# Patient Record
Sex: FEMALE | Race: WHITE | NOT HISPANIC OR LATINO | Employment: PART TIME | URBAN - NONMETROPOLITAN AREA
[De-identification: names, ages, dates, MRNs, and addresses within clinical notes are randomized per-mention and may not be internally consistent; named-entity substitution may affect disease eponyms.]

---

## 2017-07-12 ENCOUNTER — COMMUNICATION - GICH (OUTPATIENT)
Dept: FAMILY MEDICINE | Facility: OTHER | Age: 42
End: 2017-07-12

## 2017-07-12 DIAGNOSIS — K21.00 GASTRO-ESOPHAGEAL REFLUX DISEASE WITH ESOPHAGITIS: ICD-10-CM

## 2017-12-28 NOTE — TELEPHONE ENCOUNTER
Patient Information     Patient Name MRN Yanna Burleson [id number] Female 1975      Telephone Encounter by Victorino Hunt RN at 2017  1:47 PM     Author:  Victorino Hunt RN Service:  (none) Author Type:  NURS- Registered Nurse     Filed:  2017  1:57 PM Encounter Date:  2017 Status:  Signed     :  Victorino Hunt RN (NURS- Registered Nurse)            This is a Refill request from: [facility]  Name of Medication: Pepcid  Quantity requested: 60 tabs with 1 refill  Last fill date: 17 as per rx request  Due for refill: Unknown, see below  Last visit with BONG MUSA was on: 2016 in [facility]  PCP:  Bong Musa MD  Controlled Substance Agreement:  N/A   Diagnosis r/t this medication request: GERD with esophagitis    Chart review shows that rx was most recently ordered and addressed by PCP on 16, for a 2 month supply. Most recent refill was on 17 as per pharmacy report. Chart review shows that patient was seen by PCP on 16 for diagnosis as listed above, with plan as follows in relation to GERD:    Continue PPI with pepcid.  Consider change to protonix pending EGD.  May have esophageal stricture based on description.  Suggest small bites and plenty of liquids.    Patient did have EGD completed on 16, and follow up with surgeon on 16. Writer unable to find documentation in office visit with surgeon on 16 that patient was to stop or continue pepcid. Call placed to patient to discuss. Was unable to reach patient at this time. Detailed message left for patient, advising her that PCP was out of the office until 17. Advised patient that she was due for a follow up office visit with PCP to discuss continued use of pepcid after EGD, as well as that rx refill would be addressed on 17 when PCP returns to his office. Advised patient to call back with any questions/concerns. Writer will route rx request  to PCP as noted above for his consideration/approval.     Unable to complete prescription refill per RN Medication Refill Policy.................... Victorino Hunt RN ....................  7/14/2017   1:49 PM

## 2018-02-11 ENCOUNTER — DOCUMENTATION ONLY (OUTPATIENT)
Dept: FAMILY MEDICINE | Facility: OTHER | Age: 43
End: 2018-02-11

## 2018-02-11 PROBLEM — K44.9 HIATAL HERNIA: Status: ACTIVE | Noted: 2018-02-11

## 2018-02-11 PROBLEM — G89.29 NECK PAIN, CHRONIC: Status: ACTIVE | Noted: 2018-02-11

## 2018-02-11 PROBLEM — M54.2 NECK PAIN, CHRONIC: Status: ACTIVE | Noted: 2018-02-11

## 2018-02-11 RX ORDER — PANTOPRAZOLE SODIUM 40 MG/1
40 TABLET, DELAYED RELEASE ORAL DAILY
COMMUNITY
Start: 2016-12-06 | End: 2018-02-14

## 2018-02-11 RX ORDER — FAMOTIDINE 20 MG/1
20 TABLET, FILM COATED ORAL 2 TIMES DAILY
COMMUNITY
Start: 2017-07-18 | End: 2018-02-14

## 2018-02-14 DIAGNOSIS — K21.00 GASTROESOPHAGEAL REFLUX DISEASE WITH ESOPHAGITIS: ICD-10-CM

## 2018-02-14 DIAGNOSIS — K44.9 HIATAL HERNIA: Primary | ICD-10-CM

## 2018-02-14 NOTE — LETTER
February 21, 2018      Yanna Ordonez  [address] [address]        Dear Yanna,     This letter is to remind you that you are due for your annual exam with No primary care provider on file.. Your last comprehensive visit was more than 12 months ago.    A LIMITED refill of      Famotidine (PEPCID) 20 mg tablet  Pantoprazole (PROTONIX) 40 mg EC tablet    has been called into your pharmacy. Additional refills require you to complete this appointment.    Please call the clinic at [phone] to schedule your appointment.    If you should require additional refills before your scheduled appointment, please contact your pharmacy and we will refill your medication until the date of your appointment.    If you are no longer seeing Bong Che MD, please call to let us know. Doing so will remove you from our call/contact list.      Thank you for choosing [facility] and [facility] for your health care needs.    Sincerely,    Refill RN  [facility]

## 2018-02-21 RX ORDER — FAMOTIDINE 20 MG/1
TABLET, FILM COATED ORAL
Qty: 180 TABLET | Refills: 0 | Status: SHIPPED | OUTPATIENT
Start: 2018-02-21 | End: 2018-07-31

## 2018-02-21 RX ORDER — PANTOPRAZOLE SODIUM 40 MG/1
TABLET, DELAYED RELEASE ORAL
Qty: 90 TABLET | Refills: 0 | Status: SHIPPED | OUTPATIENT
Start: 2018-02-21 | End: 2018-07-31

## 2018-02-21 NOTE — TELEPHONE ENCOUNTER
Patient was due for annual exam on 12/16/2017. Patient will be sent reminder letter and given dre refills at this time.    Lizzy Rivero RN .............. 2/21/2018  9:55 AM

## 2018-03-20 ENCOUNTER — NURSE TRIAGE (OUTPATIENT)
Dept: FAMILY MEDICINE | Facility: OTHER | Age: 43
End: 2018-03-20

## 2018-03-20 NOTE — TELEPHONE ENCOUNTER
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 Patient presented at check-out window in [address] and was then triaged. Patient stated she had no personal history of HTN, but her family had a history of cardiac issues. Patient also wondered about possible hormonal changes (has had abnormal periods) or changes in blood sugar as possible culprits. Patient also states she sometimes \"feels her heart flutter.\" Blood pressure was assessed: Average (using machine) : 140/89 Right arm, sitting, Pulse around 90 bpm and again manually 148/98, Right arm, sitting.  Triage nurse called and spoke with Dr. Che and he states low blood sugar was unlikely, but requested Patient make appointment for today or tomorrow at the latest and then follow-up with him. Patient urged to call back or have someone drive her to the ED if she experienced breathing difficulty, chest pain, increased headache, dizziness, confusion, changes in vision or worsening of symptoms. Patient verbalized agreement with this plan. Patient made appointment at check-out with Antonieta Mckeon, tomorrow at 10:00 AM. Will send FYI to Antonieta Mckeon and PCP, Bong Che MD regarding this triage call.    Additional Information    Negative: Severe difficulty breathing (e.g., struggling for each breath, speaks in single words)    Negative: Difficult to awaken or acting confused  (e.g., disoriented, slurred speech)    Negative: [1] Weakness of the face, arm or leg on one side of the body AND [2] new onset    Negative: [1] Numbness (i.e., loss of sensation) of the face, arm or leg on one side of the body AND [2] new onset    Negative: [1] Chest pain lasts > 5 minutes AND [2] history of heart disease  (i.e., heart attack, bypass surgery, angina, angioplasty, CHF)    Negative: [1] " "Chest pain AND [2] took nitrogylcerin AND [3] pain was not relieved    Negative: Sounds like a life-threatening emergency to the triager    Negative: Low blood pressure is main concern    Negative: Symptom is main concern  (e.g., headache, chest pain)    Negative: [1] Pregnant AND [2] new hand or face swelling    Negative: [1] Pregnant > 20 weeks AND [2] BP  >= 140/90    Negative: Ran out of BP medications    Negative: [1] BP  >= 160 / 100 AND [2] cardiac or neurologic symptoms    (e.g., chest pain, difficulty breathing, unsteady gait, blurred vision)    Negative: [1] BP  >= 200/120  AND [2] having NO cardiac or neurologic symptoms    Negative: [1] BP  >= 180/110 AND [2] missed most recent dose of blood pressure medication    Negative: BP  >= 180/110    Negative: BP  >= 160/100    Negative: [1] Taking BP medications AND [2] feels is having side effects (e.g., impotence, cough, dizzy upon standing)    Negative: [1] BP  >= 140/90 AND [2] taking BP medications    Negative: [1] BP  >= 140/90 AND [2] not taking BP medications    Negative: [1] BP  >= 130/80 AND [2] history of heart problems, kidney disease or diabetes    Negative: Wants doctor to measure BP    Negative: [1] BP  < 140 /90 AND [2] taking BP medications (all triage questions negative)    Negative: BP  120-139 / 80-89  (all triage questions negative)    Negative: BP  < 120/80  (all triage questions negative)    Negative: Healthy diet, questions about    Negative: Low salt diet, questions about    Answer Assessment - Initial Assessment Questions  1. BLOOD PRESSURE: \"What is the blood pressure?\" \"Did you take at least two measurements 5 minutes apart?\"      \"140/100\" approx when checked here in OB at 9:00 am  2. ONSET: \"When did you take your blood pressure?\"      9:30 am here at clinic as she felt \"funny\"  3. HOW: \"How did you obtain the blood pressure?\" (e.g., visiting nurse, automatic home BP monitor)      OB nurse  4. HISTORY: \"Do you have a history of high " "blood pressure?\"      No-usually lower  5. MEDICATIONS: \"Are you taking any medications for blood pressure?\" \"Have you missed any doses recently?\"      No  6. OTHER SYMPTOMS: \"Do you have any symptoms?\" (e.g., headache, chest pain, blurred vision, difficulty breathing, weakness)      Headache, \"brain fog\", shaky, all over weakness, warm/flushed.  7. PREGNANCY: \"Is there any chance you are pregnant?\" \"When was your last menstrual period?\"      No    Protocols used: HIGH BLOOD PRESSURE-ADULT-    Lizzy Rivero RN .............. 3/20/2018  10:30 AM    Attempted reaching Patient to check in with her and see how she was feeling. Patient unavailable at this time.    Lizzy Rivero RN .............. 3/20/2018  2:58 PM    "

## 2018-03-20 NOTE — TELEPHONE ENCOUNTER
Patient was also offered appointment with me today at 230pm but elected to go home and rest and follow up tomorrow.

## 2018-03-21 ENCOUNTER — OFFICE VISIT (OUTPATIENT)
Dept: FAMILY MEDICINE | Facility: OTHER | Age: 43
End: 2018-03-21
Attending: PHYSICIAN ASSISTANT
Payer: COMMERCIAL

## 2018-03-21 VITALS
TEMPERATURE: 99.4 F | WEIGHT: 197.4 LBS | SYSTOLIC BLOOD PRESSURE: 136 MMHG | DIASTOLIC BLOOD PRESSURE: 78 MMHG | HEART RATE: 88 BPM | BODY MASS INDEX: 33.1 KG/M2

## 2018-03-21 DIAGNOSIS — Z13.29 SCREENING FOR THYROID DISORDER: ICD-10-CM

## 2018-03-21 DIAGNOSIS — Z01.419 PAP TEST, AS PART OF ROUTINE GYNECOLOGICAL EXAMINATION: ICD-10-CM

## 2018-03-21 DIAGNOSIS — N92.0 EXCESSIVE OR FREQUENT MENSTRUATION: Primary | ICD-10-CM

## 2018-03-21 DIAGNOSIS — R42 LIGHTHEADEDNESS: ICD-10-CM

## 2018-03-21 DIAGNOSIS — Z12.31 ENCOUNTER FOR SCREENING MAMMOGRAM FOR BREAST CANCER: ICD-10-CM

## 2018-03-21 LAB
ANION GAP SERPL CALCULATED.3IONS-SCNC: 7 MMOL/L (ref 3–14)
BASOPHILS # BLD AUTO: 0.1 10E9/L (ref 0–0.2)
BASOPHILS NFR BLD AUTO: 0.7 %
BUN SERPL-MCNC: 14 MG/DL (ref 7–25)
CALCIUM SERPL-MCNC: 9.6 MG/DL (ref 8.6–10.3)
CHLORIDE SERPL-SCNC: 103 MMOL/L (ref 98–107)
CO2 SERPL-SCNC: 28 MMOL/L (ref 21–31)
CREAT SERPL-MCNC: 0.75 MG/DL (ref 0.6–1.2)
DIFFERENTIAL METHOD BLD: NORMAL
EOSINOPHIL # BLD AUTO: 0.2 10E9/L (ref 0–0.7)
EOSINOPHIL NFR BLD AUTO: 2.9 %
ERYTHROCYTE [DISTWIDTH] IN BLOOD BY AUTOMATED COUNT: 12.7 % (ref 10–15)
GFR SERPL CREATININE-BSD FRML MDRD: 84 ML/MIN/1.7M2
GLUCOSE SERPL-MCNC: 102 MG/DL (ref 70–105)
HCT VFR BLD AUTO: 41.7 % (ref 35–47)
HGB BLD-MCNC: 14.2 G/DL (ref 11.7–15.7)
IMM GRANULOCYTES # BLD: 0 10E9/L (ref 0–0.4)
IMM GRANULOCYTES NFR BLD: 0.3 %
LYMPHOCYTES # BLD AUTO: 1.7 10E9/L (ref 0.8–5.3)
LYMPHOCYTES NFR BLD AUTO: 24.7 %
MCH RBC QN AUTO: 28.5 PG (ref 26.5–33)
MCHC RBC AUTO-ENTMCNC: 34.1 G/DL (ref 31.5–36.5)
MCV RBC AUTO: 84 FL (ref 78–100)
MONOCYTES # BLD AUTO: 0.3 10E9/L (ref 0–1.3)
MONOCYTES NFR BLD AUTO: 3.8 %
NEUTROPHILS # BLD AUTO: 4.6 10E9/L (ref 1.6–8.3)
NEUTROPHILS NFR BLD AUTO: 67.6 %
PLATELET # BLD AUTO: 304 10E9/L (ref 150–450)
POTASSIUM SERPL-SCNC: 4.4 MMOL/L (ref 3.5–5.1)
RBC # BLD AUTO: 4.99 10E12/L (ref 3.8–5.2)
SODIUM SERPL-SCNC: 138 MMOL/L (ref 134–144)
TSH SERPL DL<=0.05 MIU/L-ACNC: 2.44 IU/ML (ref 0.34–5.6)
WBC # BLD AUTO: 6.8 10E9/L (ref 4–11)

## 2018-03-21 PROCEDURE — 36415 COLL VENOUS BLD VENIPUNCTURE: CPT | Performed by: PHYSICIAN ASSISTANT

## 2018-03-21 PROCEDURE — 88142 CYTOPATH C/V THIN LAYER: CPT | Performed by: PHYSICIAN ASSISTANT

## 2018-03-21 PROCEDURE — 87624 HPV HI-RISK TYP POOLED RSLT: CPT | Performed by: PHYSICIAN ASSISTANT

## 2018-03-21 PROCEDURE — 85025 COMPLETE CBC W/AUTO DIFF WBC: CPT | Performed by: PHYSICIAN ASSISTANT

## 2018-03-21 PROCEDURE — 99214 OFFICE O/P EST MOD 30 MIN: CPT | Mod: 25 | Performed by: PHYSICIAN ASSISTANT

## 2018-03-21 PROCEDURE — 40001026 ZZHCL STATISTICAL PAP TEST QC: Performed by: PHYSICIAN ASSISTANT

## 2018-03-21 PROCEDURE — 80048 BASIC METABOLIC PNL TOTAL CA: CPT | Performed by: PHYSICIAN ASSISTANT

## 2018-03-21 PROCEDURE — 84443 ASSAY THYROID STIM HORMONE: CPT | Performed by: PHYSICIAN ASSISTANT

## 2018-03-21 NOTE — PROGRESS NOTES
Nursing Notes:   Laura Vyas LPN  3/21/2018 10:19 AM  Signed  Patient presents to the clinic for high blood pressures.  JUANCARLOS Green........................3/21/2018  10:07 AM      HPI:    Yanna Ordonez is a 42 year old female who presents for high blood pressures. BP was elevated yesterday.  She checked her blood pressure a few times yesterday.  Averaged 149/100.   Did not feel well yesterday.  Pressure on head and neck. Periods off. Period every 2 weeks. Lasted 10 days.  Lightheaded.  Nausea yesterday.  Irregular x couple months.  Hx heavy periods and crampking.  5-10 days.  Occasional RLQ pain.  Comes and goes.  Period ended a week ago.  No pregnancy concerns.  No STD concerns.  No recent Pap test.  No chest pain, palpitations, problems breathing, GI symptoms, urinary symptoms.  No arm pain or jaw pain.  No loss of consciousness.  No dysuria, frequency, urgency, blood in stool or urine.  Patient is due for her repeat mammogram.  No breast concerns at this time.    Past Medical History:   Diagnosis Date     Personal history of other medical treatment (CODE)     Childbirth       Past Surgical History:   Procedure Laterality Date     DILATION AND CURETTAGE      1992,after SAB       Family History   Problem Relation Age of Onset     Other - See Comments Mother 65     Celiac disease     Other - See Comments Father      Glaucoma     Family History Negative Sister      Good Health     Family History Negative Sister      Good Health     Family History Negative Brother      Good Health     Family History Negative Brother      Good Health,Twin     Family History Negative Brother      Good Health,Twin - Had GIB     HEART DISEASE No family hx of      Heart Disease,No known premature CAD     DIABETES No family hx of      Diabetes,DM2     Breast Cancer No family hx of      Cancer-breast     Colon Cancer No family hx of      Cancer-colon       Social History     Social History     Marital status:       Spouse name: Jitendra     Number of children: N/A     Years of education: N/A     Occupational History     Not on file.     Social History Main Topics     Smoking status: Current Every Day Smoker     Packs/day: 0.50     Types: Cigarettes     Last attempt to quit: 10/1/2015     Smokeless tobacco: Never Used      Comment: Quit smoking: Had quit for 6 years and started again about a month ago     Alcohol use 1.0 oz/week     Drug use: No      Comment: Drug use: No     Sexual activity: Yes     Partners: Male      Comment: Birth control method: Spouse vasectomy     Other Topics Concern     Not on file     Social History Narrative    Lives with  and three children ,   Poonam, 1993 -  at [employer], 1996 -  at [employer], 1998 - Graduates this year   Works at [employer].       Current Outpatient Prescriptions   Medication Sig Dispense Refill     pantoprazole (PROTONIX) 40 MG EC tablet Take 1 tablet by mouth once daily. 90 tablet 0     famotidine (PEPCID) 20 MG tablet Take 1 tablet by mouth 2 times daily. 180 tablet 0       No Known Allergies    REVIEW OF SYSTEMS:  Refer to HPI.    EXAM:   Vitals:    /78 (BP Location: Right arm, Patient Position: Sitting, Cuff Size: Adult Regular)  Pulse 88  Temp 99.4  F (37.4  C) (Tympanic)  Wt 197 lb 6.4 oz (89.5 kg)  LMP 03/05/2018  Breastfeeding? No  BMI 33.1 kg/m2    General Appearance: Pleasant, alert, appropriate appearance for age. No acute distress  Eye Exam:  Normal external eye, conjunctiva, lids,cornea. DIVYA.  Ear Exam: Normal TM's bilaterally, normal grey, and translucent. Normal auditory canals and external ears. Non-tender.   OroPharynx Exam:Dental hygiene adequate. Normal buccal mucosa. Normal pharynx.  Chest/Respiratory Exam: Normal chest wall and respirations. Clear to auscultation.  Breast Exam: No dimpling, nipple retraction or discharge. No masses or nodes.  Cardiovascular Exam: Regular rate and rhythm. S1,  S2, no murmur, click, gallop, or rubs.  Gastrointestinal Exam: Soft, non-tender, no masses or organomegaly. Normal BS x 4.  Genitourinary Exam Female: External genitalia, vulva and vagina appear normal. Bimanual exam reveals normaluterus and adnexa, nontender urethra and bladder. No cervical motion tenderness.  Skin: no rash or abnormalities  Neurologic Exam: Nonfocal, normal gross motor, tone coordination and no tremor.  Psychiatric Exam: Alert and oriented - appropriate affect.    PHQ Depression Screen  PHQ-9 SCORE 10/7/2016 10/10/2016 11/22/2016   Total Score 8 7 3       Labs:  Results for orders placed or performed in visit on 03/21/18   CBC with platelets and differential   Result Value Ref Range    WBC 6.8 4.0 - 11.0 10e9/L    RBC Count 4.99 3.8 - 5.2 10e12/L    Hemoglobin 14.2 11.7 - 15.7 g/dL    Hematocrit 41.7 35.0 - 47.0 %    MCV 84 78 - 100 fl    MCH 28.5 26.5 - 33.0 pg    MCHC 34.1 31.5 - 36.5 g/dL    RDW 12.7 10.0 - 15.0 %    Platelet Count 304 150 - 450 10e9/L    Diff Method Automated Method     % Neutrophils 67.6 %    % Lymphocytes 24.7 %    % Monocytes 3.8 %    % Eosinophils 2.9 %    % Basophils 0.7 %    % Immature Granulocytes 0.3 %    Absolute Neutrophil 4.6 1.6 - 8.3 10e9/L    Absolute Lymphocytes 1.7 0.8 - 5.3 10e9/L    Absolute Monocytes 0.3 0.0 - 1.3 10e9/L    Absolute Eosinophils 0.2 0.0 - 0.7 10e9/L    Absolute Basophils 0.1 0.0 - 0.2 10e9/L    Abs Immature Granulocytes 0.0 0 - 0.4 10e9/L   Basic metabolic panel  (Ca, Cl, CO2, Creat, Gluc, K, Na, BUN)   Result Value Ref Range    Sodium 138 134 - 144 mmol/L    Potassium 4.4 3.5 - 5.1 mmol/L    Chloride 103 98 - 107 mmol/L    Carbon Dioxide 28 21 - 31 mmol/L    Anion Gap 7 3 - 14 mmol/L    Glucose 102 70 - 105 mg/dL    Urea Nitrogen 14 7 - 25 mg/dL    Creatinine 0.75 0.60 - 1.20 mg/dL    GFR Estimate 84 >60 mL/min/1.7m2    GFR Estimate If Black >90 >60 mL/min/1.7m2    Calcium 9.6 8.6 - 10.3 mg/dL   TSH GH   Result Value Ref Range    Thyrotropin  2.44 0.34 - 5.60 IU/mL       ASSESSMENT AND PLAN:    1. Excessive or frequent menstruation    2. Pap test, as part of routine gynecological examination    3. Screening for thyroid disorder    4. Lightheadedness    5. Encounter for screening mammogram for breast cancer        Completed labs to rule out concerns.  Patient had an unremarkable CBC, BMP, TSH.  No acute concerns appreciated at this time.    Computed Pap and HPV testing.  Results pending.    Ordered pelvic ultrasound and referred to OB/GYN for a consult.     Encouraged to increase foods that contain iron.   Take a womens multivitamin daily.   Increase fluid intake.     Ordered screening mammogram to rule out breast cancer concerns.    Monitor for chest pain, palpitations, problems breathing, headache, increased lightheadedness or dizziness, increased vaginal bleeding.    Patient Instructions   Completed labs to rule out concerns.     Ordered ultrasound and referred to OB/GYN for a consult.     Encouraged to increase foods that contain iron.   Take a womens multivitamin daily.   Increase fluid intake.       Greater than 25 minutes were spent in counseling and coordination of care.     Antonieta Mckeon..................3/21/2018 10:15 AM

## 2018-03-21 NOTE — LETTER
Yanna Ordonez  [address] [address] [facility] [place] [address]    4/2/2018      Dear Ms. Ordonez,      We've received the results back from the laboratory for the samples you gave in clinic.  Your pap and HPV tests are normal. I would recommend repeating this important screening test in 5 years.      Please contact us at [phone] with any questions or concerns that you have.      Take Care,         Antonieta Mckeon PA-C

## 2018-03-21 NOTE — MR AVS SNAPSHOT
After Visit Summary   3/21/2018    Yanna Ordonez    MRN: [id number]           Patient Information     Date Of Birth          1975        Visit Information        Provider Department      3/21/2018 10:00 AM Antonieta Mckeon PA-C [facility]        Today's Diagnoses     Excessive or frequent menstruation    -  1    Pap test, as part of routine gynecological examination        Screening for thyroid disorder        Lightheadedness          Care Instructions    Completed labs to rule out concerns.     Ordered ultrasound and referred to OB/GYN for a consult.     Encouraged to increase foods that contain iron.   Take a womens multivitamin daily.   Increase fluid intake.           Follow-ups after your visit        Additional Services     OB/GYN REFERRAL       Your provider has referred you to:  [facility]: [facility] and [facility] [phone]   [email or web address]    Please be aware that coverage of these services is subject to the terms and limitations of your health insurance plan.  Call member services at your health plan with any benefit or coverage questions.      Please bring the following with you to your appointment:    (1) Any X-Rays, CTs or MRIs which have been performed.  Contact the facility where they were done to arrange for  prior to your scheduled appointment.   (2) List of current medications   (3) This referral request   (4) Any documents/labs given to you for this referral                  Follow-up notes from your care team     Return if symptoms worsen or fail to improve.      Future tests that were ordered for you today     Open Future Orders        Priority Expected Expires Ordered    US Pelvic Complete with Transvaginal Routine  3/21/2019 3/21/2018            Who to contact     If you have questions or need follow up information about today's clinic visit or your schedule please contact [facility] directly at [phone].  Normal or non-critical lab and imaging results will be communicated to you by MyChart, letter or phone within 4 business days after the clinic has received the results. If you do not hear from us within 7 days, please contact the clinic through [employer] or phone. If you have a critical or abnormal lab result, we will notify you by phone as soon as possible.  Submit refill requests through [employer] or call your pharmacy and they will forward the refill request to us. Please allow 3 business days for your refill to be completed.          Additional Information About Your Visit        [employer] Information     [employer] lets you send messages to your doctor, view your test results, renew your prescriptions, schedule appointments and more. To sign up, go to [email or web address] . Click on \"Log in\" on the left side of the screen, which will take you to the Welcome page. Then click on \"Sign up Now\" on the right side of the page.     You will be asked to enter the access code listed below, as well as some personal information. Please follow the directions to create your username and password.     Your access code is: [email or web address]  Expires: 2018 10:32 AM     Your access code will  in 90 days. If you need help or a new code, please call your [facility] clinic or [phone].        Care EveryWhere ID     This is your Care EveryWhere ID. This could be used by other organizations to access your [facility] medical records  [id number]        Your Vitals Were     Pulse Temperature Last Period Breastfeeding? BMI (Body Mass Index)       88 99.4  F (37.4  C) (Tympanic) 2018 No 33.1 kg/m2        Blood Pressure from Last 3 Encounters:   18 136/78   16 136/80   16 138/88    Weight from Last 3 Encounters:   18 197 lb 6.4 oz (89.5 kg)   16 199 lb (90.3 kg)   16 193 lb (87.5 kg)              We Performed the Following     Basic metabolic panel  (Ca, Cl, " CO2, Creat, Gluc, K, Na, BUN)     CBC with platelets and differential     HPV High Risk Types DNA Cervical     OB/GYN REFERRAL     Pap Diagnostic Thin Prep with HPV (select HPV order below)     [facility]        Primary Care Provider Office Phone # Fax #    Bong Che [phone] [address]  [place] [address]        Equal Access to Services     [facility]: Hadii aad ku hadasho Soomaali, waaxda luqadaha, qaybta kaalmada adeegyada, waxay idiin hayaan adeeg kharash la'aan . So [facility] [phone].    ATENCIÓN: Si habla español, tiene a shelby disposición servicios gratuitos de asistencia lingüística. Llame al [phone].    We comply with applicable federal civil rights laws and [place] laws. We do not discriminate on the basis of race, color, national origin, age, disability, sex, sexual orientation, or gender identity.            Thank you!     Thank you for choosing [facility]  for your care. Our goal is always to provide you with excellent care. Hearing back from our patients is one way we can continue to improve our services. Please take a few minutes to complete the written survey that you may receive in the mail after your visit with us. Thank you!             Your Updated Medication List - Protect others around you: Learn how to safely use, store and throw away your medicines at www.disposemymeds.org.          This list is accurate as of 3/21/18 10:32 AM.  Always use your most recent med list.                   Brand Name Dispense Instructions for use Diagnosis    famotidine 20 MG tablet    PEPCID    180 tablet    Take 1 tablet by mouth 2 times daily.    Gastroesophageal reflux disease with esophagitis       pantoprazole 40 MG EC tablet    PROTONIX    90 tablet    Take 1 tablet by mouth once daily.    Hiatal hernia

## 2018-03-21 NOTE — NURSING NOTE
Patient presents to the clinic for high blood pressures.  JUANCARLOS Green........................3/21/2018  10:07 AM

## 2018-03-21 NOTE — PATIENT INSTRUCTIONS
Completed labs to rule out concerns.     Ordered ultrasound and referred to OB/GYN for a consult.     Encouraged to increase foods that contain iron.   Take a womens multivitamin daily.   Increase fluid intake.

## 2018-03-29 LAB
FINAL DIAGNOSIS: NORMAL
HPV HR 12 DNA CVX QL NAA+PROBE: NEGATIVE
HPV16 DNA SPEC QL NAA+PROBE: NEGATIVE
HPV18 DNA SPEC QL NAA+PROBE: NEGATIVE
SPECIMEN DESCRIPTION: NORMAL
SPECIMEN SOURCE CVX/VAG CYTO: NORMAL

## 2018-04-04 ENCOUNTER — HOSPITAL ENCOUNTER (OUTPATIENT)
Dept: MAMMOGRAPHY | Facility: OTHER | Age: 43
End: 2018-04-04
Attending: PHYSICIAN ASSISTANT
Payer: COMMERCIAL

## 2018-04-04 ENCOUNTER — HOSPITAL ENCOUNTER (OUTPATIENT)
Dept: ULTRASOUND IMAGING | Facility: OTHER | Age: 43
Discharge: HOME | End: 2018-04-04
Attending: PHYSICIAN ASSISTANT | Admitting: PHYSICIAN ASSISTANT
Payer: COMMERCIAL

## 2018-04-04 DIAGNOSIS — Z12.31 ENCOUNTER FOR SCREENING MAMMOGRAM FOR BREAST CANCER: ICD-10-CM

## 2018-04-04 DIAGNOSIS — N92.0 EXCESSIVE OR FREQUENT MENSTRUATION: ICD-10-CM

## 2018-04-04 PROCEDURE — 77067 SCR MAMMO BI INCL CAD: CPT

## 2018-04-04 PROCEDURE — 76856 US EXAM PELVIC COMPLETE: CPT

## 2018-04-10 ENCOUNTER — OFFICE VISIT (OUTPATIENT)
Dept: OBGYN | Facility: OTHER | Age: 43
End: 2018-04-10
Attending: PHYSICIAN ASSISTANT
Payer: COMMERCIAL

## 2018-04-10 VITALS
SYSTOLIC BLOOD PRESSURE: 128 MMHG | HEART RATE: 76 BPM | BODY MASS INDEX: 33.09 KG/M2 | WEIGHT: 197.3 LBS | DIASTOLIC BLOOD PRESSURE: 84 MMHG

## 2018-04-10 DIAGNOSIS — N83.201 CYSTS OF BOTH OVARIES: Primary | ICD-10-CM

## 2018-04-10 DIAGNOSIS — N92.0 EXCESSIVE OR FREQUENT MENSTRUATION: ICD-10-CM

## 2018-04-10 DIAGNOSIS — N83.202 CYSTS OF BOTH OVARIES: Primary | ICD-10-CM

## 2018-04-10 PROCEDURE — 99203 OFFICE O/P NEW LOW 30 MIN: CPT | Performed by: OBSTETRICS & GYNECOLOGY

## 2018-04-10 PROCEDURE — 88305 TISSUE EXAM BY PATHOLOGIST: CPT | Performed by: OBSTETRICS & GYNECOLOGY

## 2018-04-10 ASSESSMENT — PAIN SCALES - GENERAL: PAINLEVEL: NO PAIN (0)

## 2018-04-10 NOTE — LETTER
4/10/2018       RE: Yanna Ordonez  [id number] [facility] [place] [address]     Dear Colleague,    Thank you for referring your patient, Yanna Ordonez, to the [facility] at [facility]. Please see a copy of my visit note below.    CC: heavy and irregular bleeding  HPI:  Yanna is a 42 year old female who presents for evaluation of heavy and irregular periods. This has been worsening especially over the last few months. US was done by her primary care provider showing essentially a normal uterus, however, on my views there is possibly an anterior intramural fibroid. Her bowels and bladder are normal. No breast concerns.  Patient's last menstrual period was 03/10/2018.  Menstrual history: as above  Bladder concerns: no  Bowel concerns:no  Breast concerns:no  Birth control: vasectomy, stable relationship  STI history: denies    Pap Smears: normal  Mammograms: normal.    Obstetric History     No data available        Past Medical History:   Diagnosis Date     Personal history of other medical treatment (CODE)     Childbirth     Past Surgical History:   Procedure Laterality Date     DILATION AND CURETTAGE      1992,after SAB     Social History     Social History     Marital status:      Spouse name: Jitendra     Number of children: N/A     Years of education: N/A     Occupational History     Not on file.     Social History Main Topics     Smoking status: Current Every Day Smoker     Packs/day: 0.50     Types: Cigarettes     Last attempt to quit: 10/1/2015     Smokeless tobacco: Never Used      Comment: Quit smoking: Had quit for 6 years and started again about a month ago     Alcohol use No     Drug use: No      Comment: Drug use: No     Sexual activity: Yes     Partners: Male     Birth control/ protection: None      Comment: Birth control method: Spouse vasectomy     Other Topics Concern     Not on file     Social History Narrative    Lives with   and three children ,   [place], 1993 -  at [place], 1996 -  at [employer], 1998 - Graduates this year   Works at [employer].     Family History   Problem Relation Age of Onset     Other - See Comments Mother 65     Celiac disease     Other - See Comments Father      Glaucoma     Family History Negative Sister      Good Health     Family History Negative Sister      Good Health     Family History Negative Brother      Good Health     Family History Negative Brother      Good Health,Twin     Family History Negative Brother      Good Health,Twin - Had GIB     HEART DISEASE No family hx of      Heart Disease,No known premature CAD     DIABETES No family hx of      Diabetes,DM2     Breast Cancer No family hx of      Cancer-breast     Colon Cancer No family hx of      Cancer-colon       Current Outpatient Prescriptions   Medication     pantoprazole (PROTONIX) 40 MG EC tablet     famotidine (PEPCID) 20 MG tablet     No current facility-administered medications for this visit.      No Known Allergies  /84 (BP Location: Right arm)  Pulse 76  Wt 89.5 kg (197 lb 4.8 oz)  LMP 03/10/2018  Breastfeeding? No  BMI 33.09 kg/m2    REVIEW OF SYSTEMS  General: negative  Resp: No shortness of breath, dyspnea on exertion, cough, or hemoptysis  CV: negative  GI: negative  : negative  Neurologic: negative  Hematologic: negative  Endocrine: negative    Exam:  Constitutional: healthy, alert and no distress  Head: Normocephalic. No masses, lesions, tenderness or abnormalities  Pelvic exam: normal vagina and vulva, normal cervix without lesions or tenderness, uterus normal size anteverted, adenxa normal in size without tenderness, exam chaperoned by nurse.  After consent was obtained, the cervix was prepped with betadine. The cervix was grasped with a tenaculum and gently dilated with an Os Finder. She sounded to 11 cm and three passes were performed productive of a modest  amount of tissue. The cervix was released and no bleeding noted. Patient tolerated the procedure well.    Lab:   Results for orders placed or performed during the hospital encounter of 04/04/18   US Pelvic Complete with Transvaginal    Narrative    PROCEDURE: US PELVIC COMPLETE WITH TRANSVAGINAL 4/4/2018 10:15 AM    HISTORY: abnormal vaginal bleeding for 3-4 months; Excessive or  frequent menstruation    COMPARISONS: None.    TECHNIQUE: Transabdominal and endovaginal imaging.    FINDINGS: Uterus measures 8.9 x 5.4 x 4.7 cm. Endometrial stripe  measures 8 mm. No focal uterine fibroid is seen but there are small  nabothian cysts.    Both ovaries are visualized. Right ovary measures 2.1 x 2.5 x 2.3 cm.  It contains a 1.9 cm complex cystic lesion with linear internal  echoes.    Left ovary measures 4.4 x 3.3 x 3.6 cm. It also contains a complex  cystic lesion. This measures 2.8 x 2.7 cm in size. Other left-sided  follicles are also visualized. No free fluid is seen.         Impression    IMPRESSION: Complex cystic lesions in both ovaries, probably  hemorrhagic cysts or follicles.    SABI EDMONDSON MD       ASSESSMENT/PLAN :  1. Excessive or frequent menstruation      (N92.0) Excessive or frequent menstruation  Comment:   Plan: Surgical pathology exam          If benign biopsy discussed Mirena IUD, Novosure ablation, vaginal hysterectomy, use of OCP's and Depo Provera.  She is leaning toward Mirena at this time. Will set up insertion if she has a negative biopsy.    Recommend repeat US in 8 weeks to recheck ovaries.        TT:30 minutes with over half spent in discussion of management of heavy menses.    Again, thank you for allowing me to participate in the care of your patient.      Sincerely,    Demarco Verdugo MD

## 2018-04-10 NOTE — NURSING NOTE
Prior to the start of the procedure and with procedural staff participation, I verbally confirmed the patient s identity using two indicators, relevant allergies, that the procedure was appropriate and matched the consent or emergent situation, and that the correct equipment/implants were available. Immediately prior to starting the procedure I conducted the Time Out with the procedural staff and re-confirmed the patient s name, procedure, and site/side. (The Joint Commission universal protocol was followed.)  Yes    Sedation (Moderate or Deep): None  Kelsey Jurado LPN ....................  4/10/2018   11:24 AM

## 2018-04-10 NOTE — PATIENT INSTRUCTIONS
Endometrial Ablation  Endometrial ablation is an outpatient surgery that can reduce or stop heavy menstrual bleeding. Ablation destroys the lining of the uterus. This surgery is for women who do not want to have any more children and who have not yet entered menopause. It should not be used by women with endometrial hyperplasia or cancer of the uterus. Surgery takes less than an hour, and you can go home later that day.Endometrial ablation is an outpatient surgery that can reduce or stop heavy menstrual bleeding. Ablation destroys the lining of the uterus. This surgery is for women who do not want to have any more children and who have not yet entered menopause. It should not be used by women with endometrial hyperplasia or cancer of the uterus. Surgery takes less than an hour, and you can go home later that day.  Preparing for surgery    You may be given medicine by mouth or injection for a few weeks or months before your surgery. This thins the lining of the uterus and reduces bleeding.    Your healthcare provider may recommend other procedures to check the inside of your uterus before the ablation is done.     The day before surgery, you may be given medicine or a special substance (laminaria) may be put into the opening to the uterus (cervix). This widens the opening.    To help prevent problems with anesthesia, do not eat or drink anything 10 hours before surgery.  Your surgery     Destroying the lining with heat, freezing, or electric current prevents the lining from growing back.        You ll be given anesthesia so you stay comfortable and relaxed. You will not feel pain during surgery.    Your uterus may be filled with fluid. This puts pressure on the lining to help reduce bleeding. It also allows your healthcare provider to see inside your uterus.    Your healthcare provider puts a small telescope-like instrument through the cervix. This scope may be connected to a video monitor. This helps your  healthcare provider see and control the ablation process. At the end of the scope, a device using heat, extreme cold, or electric current destroys the uterine lining. Instead of the scope, your healthcare provider may use another device that both expands and destroys the uterine lining. After being inserted into your uterus, it also uses heat or other energy to destroy the lining. Your healthcare provider will choose the device that s best for you.  Your recovery    You may have cramping or aching in your abdomen after surgery. Your healthcare provider can give you pain medicine.    You may also have a bloody or watery discharge or bleeding for days or weeks. Use sanitary pads, not tampons.    Don t have sex or play active sports for 2 weeks after surgery.    You can likely return to work in 2 days.    Ask your healthcare provider about using contraception after an ablation.    Your healthcare provider will see you in about 6 weeks to check how well you are healing.  Call your healthcare provider if you have any of the following after surgery:    Chills    Fever of 100.4 F (38 C) or higher, or as directed by your healthcare provider    Frequent urination for 24 hours    Heavy vaginal bleeding    Nausea    Persistent or increased abdominal pain    Shortness of breath   Date Last Reviewed: 6/1/2017 2000-2017 [employer]. [address] [place] [address]. All rights reserved. This information is not intended as a substitute for professional medical care. Always follow your healthcare professional's instructions.        Levonorgestrel intrauterine device (IUD)  Brand Names: Kyleena, LILETTA, Mirena, Renetta  What is this medicine?  LEVONORGESTREL IUD (JUNIOR lockhart) is a contraceptive (birth control) device. The device is placed inside the uterus by a healthcare professional. It is used to prevent pregnancy. This device can also be used to treat heavy bleeding that occurs during your  period.  How should I use this medicine?  This device is placed inside the uterus by a health care professional.  Talk to your pediatrician regarding the use of this medicine in children. Special care may be needed.  What side effects may I notice from receiving this medicine?  Side effects that you should report to your doctor or health care professional as soon as possible:    allergic reactions like skin rash, itching or hives, swelling of the face, lips, or tongue    fever, flu-like symptoms    genital sores    high blood pressure    no menstrual period for 6 weeks during use    pain, swelling, warmth in the leg    pelvic pain or tenderness    severe or sudden headache    signs of pregnancy    stomach cramping    sudden shortness of breath    trouble with balance, talking, or walking    unusual vaginal bleeding, discharge    yellowing of the eyes or skin  Side effects that usually do not require medical attention (report to your doctor or health care professional if they continue or are bothersome):    acne    breast pain    change in sex drive or performance    changes in weight    cramping, dizziness, or faintness while the device is being inserted    headache    irregular menstrual bleeding within first 3 to 6 months of use    nausea  What may interact with this medicine?  Do not take this medicine with any of the following medications:    amprenavir    bosentan    fosamprenavir  This medicine may also interact with the following medications:    aprepitant    barbiturate medicines for inducing sleep or treating seizures    bexarotene    griseofulvin    medicines to treat seizures like carbamazepine, ethotoin, felbamate, oxcarbazepine, phenytoin, topiramate    modafinil    pioglitazone    rifabutin    rifampin    rifapentine    some medicines to treat HIV infection like atazanavir, indinavir, lopinavir, nelfinavir, tipranavir, ritonavir    Sim's wort    warfarin  What if I miss a dose?  This does not  apply. Depending on the brand of device you have inserted, the device will need to be replaced every 3 to 5 years if you wish to continue using this type of birth control.  Where should I keep my medicine?  This does not apply.  What should I tell my health care provider before I take this medicine?  They need to know if you have any of these conditions:    abnormal Pap smear    cancer of the breast, uterus, or cervix    diabetes    endometritis    genital or pelvic infection now or in the past    have more than one sexual partner or your partner has more than one partner    heart disease    history of an ectopic or tubal pregnancy    immune system problems    IUD in place    liver disease or tumor    problems with blood clots or take blood-thinners    seizures    use intravenous drugs    uterus of unusual shape    vaginal bleeding that has not been explained    an unusual or allergic reaction to levonorgestrel, other hormones, silicone, or polyethylene, medicines, foods, dyes, or preservatives    pregnant or trying to get pregnant    breast-feeding  What should I watch for while using this medicine?  Visit your doctor or health care professional for regular check ups. See your doctor if you or your partner has sexual contact with others, becomes HIV positive, or gets a sexual transmitted disease.  This product does not protect you against HIV infection (AIDS) or other sexually transmitted diseases.  You can check the placement of the IUD yourself by reaching up to the top of your vagina with clean fingers to feel the threads. Do not pull on the threads. It is a good habit to check placement after each menstrual period. Call your doctor right away if you feel more of the IUD than just the threads or if you cannot feel the threads at all.  The IUD may come out by itself. You may become pregnant if the device comes out. If you notice that the IUD has come out use a backup birth control method like condoms and call  your health care provider.  Using tampons will not change the position of the IUD and are okay to use during your period.  This IUD can be safely scanned with magnetic resonance imaging (MRI) only under specific conditions. Before you have an MRI, tell your healthcare provider that you have an IUD in place, and which type of IUD you have in place.  NOTE:This sheet is a summary. It may not cover all possible information. If you have questions about this medicine, talk to your doctor, pharmacist, or health care provider. Copyright  2017 [employer]        Thinking About Hysterectomy    Before suggesting a hysterectomy, your doctor will evaluate your health problem. You and your doctor will go over the results of your exams and tests. Together, you can discuss your options and make a treatment plan.  Planning your treatment  Options for treating your problem may include medicine, nonsurgical procedures, hysterectomy, or a combination of treatments. While you are considering your options, think about these questions:    What other treatments are available? Are there medicines or other types of surgery that are likely to relieve your symptoms?    How severe is your problem? Is your health problem getting in the way of your daily life? Is the problem getting worse? If the answer to these questions is  no,  a hysterectomy may not be needed.    Do you want to have children? If you are in your childbearing years and wish to have children, take time to explore options that may help you avoid a hysterectomy.    Are you at risk of ovarian cancer? If you re at increased risk of ovarian cancer, your doctor may suggest removing the ovaries and fallopian tubes along with the uterus.  Date Last Reviewed: 3/1/2017    6037-6750 [employer]. [address] [place] [address], [place] [address]. All rights reserved. This information is not intended as a substitute for professional medical care. Always follow your healthcare  professional's instructions.        Hysterectomy: Surgical Procedures  A hysterectomy is the removal of a woman s uterus. It can relieve symptoms such as severe pain and bleeding. If you have cancer, it may save your life. You will discuss what type of surgery you will have with your health care provider. This will depend on your health problem. If you have any questions or concerns, let your health care provider know.  Reaching the uterus  There are several ways to reach the uterus to remove it. The best approach depends on the reason for your surgery. The 3 main approaches are described below:    Vaginal. An incision is made inside the vagina. The uterus is then removed through this incision. This can be done if the uterus is not too large.    Laparoscopic. A thin tube with a camera and a light on the end is used. This is called a laparoscope. The doctor makes 2 to 4 small incisions in the abdomen. The scope is put through 1 of the incisions. The scope sends live pictures to a video screen. This allows the doctor see inside the abdomen. Surgical tools are placed through the other small incisions. The uterus can be removed through these incisions or through an incision made in the vagina. One of the following procedures may be done:    The uterus is removed through a small incision in the vagina. This is called  LAVH.     The uterus is removed through the small incisions in the abdomen. The cervix is left in place. This is called  LSH.     The uterus and cervix are removed through the small incisions in the abdomen. This is called  TLH.     During any of these procedures, robotic technique may be used. This assists the surgeon s vision and hand movements.    Abdominal. One incision of 4 to 6 inches is made in the abdomen. The uterus is removed through this incision.   Types of Hysterectomy     Total hysterectomy       Subtotal hysterectomy       Hysterectomy with salpingo-oophorectomy      When the uterus is  removed, the cervix may be left in place. Or it may also be removed. If it s removed, the top of the vagina is closed. In certain cases, the ovaries and fallopian tubes are also removed.     Removing the uterus. During a total (simple) hysterectomy, the uterus and cervix are removed. During a subtotal hysterectomy, only the uterus is removed. The cervix is left in place. This is also called a supracervical hysterectomy. In either case, the ovaries and fallopian tubes remain. If you have not yet reached menopause, the ovaries will keep making hormones. You may still feel the changes of menstrual cycles. But you will not have periods and can t become pregnant.    Removing the uterus, ovaries, and tubes. Along with the uterus, the ovaries and fallopian tubes may also be removed. This is called a hysterectomy with salpingo-oophorectomy. It causes the body s estrogen levels to drop quickly. This is called  surgical  menopause. Women who have not reached menopause before surgery may have sudden symptoms. But these symptoms can be treated.   Deciding on hysterectomy  You and your doctor can discuss the best options for you. As you decide, your doctor may ask you to think about the following:    Is your health problem getting in the way of your daily life? Is the problem getting worse? If not, might other treatments be tried first?    Do you want to have children? If so, other treatments should be considered.    Should the fallopian tubes be removed too? This will reduce the changes of ovarian cancer since we  now know that many ovarian cancers actually from the the tubes.    Should the ovaries be removed too? This is often done to treat or prevent cancer. If removal is needed, talk to your doctor about hormone therapy.  Risks and possible complications of a hysterectomy include    Side effects from the anesthesia    Infection    Bleeding, with a possible need for transfusion    Damage to nearby organs (bladder, bowel,  ureters, or nearby nerves or blood vessels)    Blood clots in the legs or lungs    Formation of scar tissue that may cause pain or bowel obstruction in the future. This is more common with the abdominal approach.    Need for second surgery   Date Last Reviewed: 5/13/2015 2000-2017 [employer]. [address] [place] [address]. All rights reserved. This information is not intended as a substitute for professional medical care. Always follow your healthcare professional's instructions.         never

## 2018-04-10 NOTE — PROGRESS NOTES
CC: heavy and irregular bleeding  HPI:  Yanna is a 42 year old female who presents for evaluation of heavy and irregular periods. This has been worsening especially over the last few months. US was done by her primary care provider showing essentially a normal uterus, however, on my views there is possibly an anterior intramural fibroid. Her bowels and bladder are normal. No breast concerns.  Patient's last menstrual period was 03/10/2018.  Menstrual history: as above  Bladder concerns: no  Bowel concerns:no  Breast concerns:no  Birth control: vasectomy, stable relationship  STI history: denies    Pap Smears: normal  Mammograms: normal.    Obstetric History     No data available        Past Medical History:   Diagnosis Date     Personal history of other medical treatment (CODE)     Childbirth     Past Surgical History:   Procedure Laterality Date     DILATION AND CURETTAGE      1992,after SAB     Social History     Social History     Marital status:      Spouse name: Jitendra     Number of children: N/A     Years of education: N/A     Occupational History     Not on file.     Social History Main Topics     Smoking status: Current Every Day Smoker     Packs/day: 0.50     Types: Cigarettes     Last attempt to quit: 10/1/2015     Smokeless tobacco: Never Used      Comment: Quit smoking: Had quit for 6 years and started again about a month ago     Alcohol use No     Drug use: No      Comment: Drug use: No     Sexual activity: Yes     Partners: Male     Birth control/ protection: None      Comment: Birth control method: Spouse vasectomy     Other Topics Concern     Not on file     Social History Narrative    Lives with  and three children ,   Poonam, 1993 -  at [facility] [employer] [facility], 1996 -  at [employer], 1998 - Graduates this year   Works at [employer].     Family History   Problem Relation Age of Onset     Other - See Comments Mother 65     Celiac disease     Other -  See Comments Father      Glaucoma     Family History Negative Sister      Good Health     Family History Negative Sister      Good Health     Family History Negative Brother      Good Health     Family History Negative Brother      Good Health,Twin     Family History Negative Brother      Good Health,Twin - Had GIB     HEART DISEASE No family hx of      Heart Disease,No known premature CAD     DIABETES No family hx of      Diabetes,DM2     Breast Cancer No family hx of      Cancer-breast     Colon Cancer No family hx of      Cancer-colon       Current Outpatient Prescriptions   Medication     pantoprazole (PROTONIX) 40 MG EC tablet     famotidine (PEPCID) 20 MG tablet     No current facility-administered medications for this visit.      No Known Allergies  /84 (BP Location: Right arm)  Pulse 76  Wt 89.5 kg (197 lb 4.8 oz)  LMP 03/10/2018  Breastfeeding? No  BMI 33.09 kg/m2    REVIEW OF SYSTEMS  General: negative  Resp: No shortness of breath, dyspnea on exertion, cough, or hemoptysis  CV: negative  GI: negative  : negative  Neurologic: negative  Hematologic: negative  Endocrine: negative    Exam:  Constitutional: healthy, alert and no distress  Head: Normocephalic. No masses, lesions, tenderness or abnormalities  Pelvic exam: normal vagina and vulva, normal cervix without lesions or tenderness, uterus normal size anteverted, adenxa normal in size without tenderness, exam chaperoned by nurse.  After consent was obtained, the cervix was prepped with betadine. The cervix was grasped with a tenaculum and gently dilated with an Os Finder. She sounded to 11 cm and three passes were performed productive of a modest amount of tissue. The cervix was released and no bleeding noted. Patient tolerated the procedure well.    Lab:   Results for orders placed or performed during the hospital encounter of 04/04/18   US Pelvic Complete with Transvaginal    Narrative    PROCEDURE: US PELVIC COMPLETE WITH TRANSVAGINAL  4/4/2018 10:15 AM    HISTORY: abnormal vaginal bleeding for 3-4 months; Excessive or  frequent menstruation    COMPARISONS: None.    TECHNIQUE: Transabdominal and endovaginal imaging.    FINDINGS: Uterus measures 8.9 x 5.4 x 4.7 cm. Endometrial stripe  measures 8 mm. No focal uterine fibroid is seen but there are small  nabothian cysts.    Both ovaries are visualized. Right ovary measures 2.1 x 2.5 x 2.3 cm.  It contains a 1.9 cm complex cystic lesion with linear internal  echoes.    Left ovary measures 4.4 x 3.3 x 3.6 cm. It also contains a complex  cystic lesion. This measures 2.8 x 2.7 cm in size. Other left-sided  follicles are also visualized. No free fluid is seen.         Impression    IMPRESSION: Complex cystic lesions in both ovaries, probably  hemorrhagic cysts or follicles.    SABI EDMONDSON MD       ASSESSMENT/PLAN :  1. Excessive or frequent menstruation      (N92.0) Excessive or frequent menstruation  Comment:   Plan: Surgical pathology exam          If benign biopsy discussed Mirena IUD, Novosure ablation, vaginal hysterectomy, use of OCP's and Depo Provera.  She is leaning toward Mirena at this time. Will set up insertion if she has a negative biopsy.    Recommend repeat US in 8 weeks to recheck ovaries.        TT:30 minutes with over half spent in discussion of management of heavy menses.    Demarco Verdugo MD FACOG  1:09 PM 4/10/2018

## 2018-04-10 NOTE — MR AVS SNAPSHOT
After Visit Summary   4/10/2018    Yanna Ordonez    MRN: [id number]           Patient Information     Date Of Birth          1975        Visit Information        Provider Department      4/10/2018 11:00 AM Demarco Verdugo MD [facility]        Today's Diagnoses     Excessive or frequent menstruation          Care Instructions      Endometrial Ablation  Endometrial ablation is an outpatient surgery that can reduce or stop heavy menstrual bleeding. Ablation destroys the lining of the uterus. This surgery is for women who do not want to have any more children and who have not yet entered menopause. It should not be used by women with endometrial hyperplasia or cancer of the uterus. Surgery takes less than an hour, and you can go home later that day.Endometrial ablation is an outpatient surgery that can reduce or stop heavy menstrual bleeding. Ablation destroys the lining of the uterus. This surgery is for women who do not want to have any more children and who have not yet entered menopause. It should not be used by women with endometrial hyperplasia or cancer of the uterus. Surgery takes less than an hour, and you can go home later that day.  Preparing for surgery    You may be given medicine by mouth or injection for a few weeks or months before your surgery. This thins the lining of the uterus and reduces bleeding.    Your healthcare provider may recommend other procedures to check the inside of your uterus before the ablation is done.     The day before surgery, you may be given medicine or a special substance (laminaria) may be put into the opening to the uterus (cervix). This widens the opening.    To help prevent problems with anesthesia, do not eat or drink anything 10 hours before surgery.  Your surgery     Destroying the lining with heat, freezing, or electric current prevents the lining from growing back.        You ll be given anesthesia so you stay comfortable  and relaxed. You will not feel pain during surgery.    Your uterus may be filled with fluid. This puts pressure on the lining to help reduce bleeding. It also allows your healthcare provider to see inside your uterus.    Your healthcare provider puts a small telescope-like instrument through the cervix. This scope may be connected to a video monitor. This helps your healthcare provider see and control the ablation process. At the end of the scope, a device using heat, extreme cold, or electric current destroys the uterine lining. Instead of the scope, your healthcare provider may use another device that both expands and destroys the uterine lining. After being inserted into your uterus, it also uses heat or other energy to destroy the lining. Your healthcare provider will choose the device that s best for you.  Your recovery    You may have cramping or aching in your abdomen after surgery. Your healthcare provider can give you pain medicine.    You may also have a bloody or watery discharge or bleeding for days or weeks. Use sanitary pads, not tampons.    Don t have sex or play active sports for 2 weeks after surgery.    You can likely return to work in 2 days.    Ask your healthcare provider about using contraception after an ablation.    Your healthcare provider will see you in about 6 weeks to check how well you are healing.  Call your healthcare provider if you have any of the following after surgery:    Chills    Fever of 100.4 F (38 C) or higher, or as directed by your healthcare provider    Frequent urination for 24 hours    Heavy vaginal bleeding    Nausea    Persistent or increased abdominal pain    Shortness of breath   Date Last Reviewed: 6/1/2017 2000-2017 [employer]. [address] [place] [address], [place] [address]. All rights reserved. This information is not intended as a substitute for professional medical care. Always follow your healthcare professional's  instructions.        Levonorgestrel intrauterine device (IUD)  Brand Names: Kyleena, LILETTA, Mirena, Renetta  What is this medicine?  LEVONORGESTREL IUD (JUNIOR garcial) is a contraceptive (birth control) device. The device is placed inside the uterus by a healthcare professional. It is used to prevent pregnancy. This device can also be used to treat heavy bleeding that occurs during your period.  How should I use this medicine?  This device is placed inside the uterus by a health care professional.  Talk to your pediatrician regarding the use of this medicine in children. Special care may be needed.  What side effects may I notice from receiving this medicine?  Side effects that you should report to your doctor or health care professional as soon as possible:    allergic reactions like skin rash, itching or hives, swelling of the face, lips, or tongue    fever, flu-like symptoms    genital sores    high blood pressure    no menstrual period for 6 weeks during use    pain, swelling, warmth in the leg    pelvic pain or tenderness    severe or sudden headache    signs of pregnancy    stomach cramping    sudden shortness of breath    trouble with balance, talking, or walking    unusual vaginal bleeding, discharge    yellowing of the eyes or skin  Side effects that usually do not require medical attention (report to your doctor or health care professional if they continue or are bothersome):    acne    breast pain    change in sex drive or performance    changes in weight    cramping, dizziness, or faintness while the device is being inserted    headache    irregular menstrual bleeding within first 3 to 6 months of use    nausea  What may interact with this medicine?  Do not take this medicine with any of the following medications:    amprenavir    bosentan    fosamprenavir  This medicine may also interact with the following medications:    aprepitant    barbiturate medicines for inducing sleep or treating  seizures    bexarotene    griseofulvin    medicines to treat seizures like carbamazepine, ethotoin, felbamate, oxcarbazepine, phenytoin, topiramate    modafinil    pioglitazone    rifabutin    rifampin    rifapentine    some medicines to treat HIV infection like atazanavir, indinavir, lopinavir, nelfinavir, tipranavir, ritonavir    Loma's wort    warfarin  What if I miss a dose?  This does not apply. Depending on the brand of device you have inserted, the device will need to be replaced every 3 to 5 years if you wish to continue using this type of birth control.  Where should I keep my medicine?  This does not apply.  What should I tell my health care provider before I take this medicine?  They need to know if you have any of these conditions:    abnormal Pap smear    cancer of the breast, uterus, or cervix    diabetes    endometritis    genital or pelvic infection now or in the past    have more than one sexual partner or your partner has more than one partner    heart disease    history of an ectopic or tubal pregnancy    immune system problems    IUD in place    liver disease or tumor    problems with blood clots or take blood-thinners    seizures    use intravenous drugs    uterus of unusual shape    vaginal bleeding that has not been explained    an unusual or allergic reaction to levonorgestrel, other hormones, silicone, or polyethylene, medicines, foods, dyes, or preservatives    pregnant or trying to get pregnant    breast-feeding  What should I watch for while using this medicine?  Visit your doctor or health care professional for regular check ups. See your doctor if you or your partner has sexual contact with others, becomes HIV positive, or gets a sexual transmitted disease.  This product does not protect you against HIV infection (AIDS) or other sexually transmitted diseases.  You can check the placement of the IUD yourself by reaching up to the top of your vagina with clean fingers to feel the  threads. Do not pull on the threads. It is a good habit to check placement after each menstrual period. Call your doctor right away if you feel more of the IUD than just the threads or if you cannot feel the threads at all.  The IUD may come out by itself. You may become pregnant if the device comes out. If you notice that the IUD has come out use a backup birth control method like condoms and call your health care provider.  Using tampons will not change the position of the IUD and are okay to use during your period.  This IUD can be safely scanned with magnetic resonance imaging (MRI) only under specific conditions. Before you have an MRI, tell your healthcare provider that you have an IUD in place, and which type of IUD you have in place.  NOTE:This sheet is a summary. It may not cover all possible information. If you have questions about this medicine, talk to your doctor, pharmacist, or health care provider. Copyright  2017 [employer]        Thinking About Hysterectomy    Before suggesting a hysterectomy, your doctor will evaluate your health problem. You and your doctor will go over the results of your exams and tests. Together, you can discuss your options and make a treatment plan.  Planning your treatment  Options for treating your problem may include medicine, nonsurgical procedures, hysterectomy, or a combination of treatments. While you are considering your options, think about these questions:    What other treatments are available? Are there medicines or other types of surgery that are likely to relieve your symptoms?    How severe is your problem? Is your health problem getting in the way of your daily life? Is the problem getting worse? If the answer to these questions is  no,  a hysterectomy may not be needed.    Do you want to have children? If you are in your childbearing years and wish to have children, take time to explore options that may help you avoid a hysterectomy.    Are you at risk of  ovarian cancer? If you re at increased risk of ovarian cancer, your doctor may suggest removing the ovaries and fallopian tubes along with the uterus.  Date Last Reviewed: 3/1/2017    2075-0043 [employer]. [address] [place] [address], [place] [address]. All rights reserved. This information is not intended as a substitute for professional medical care. Always follow your healthcare professional's instructions.        Hysterectomy: Surgical Procedures  A hysterectomy is the removal of a woman s uterus. It can relieve symptoms such as severe pain and bleeding. If you have cancer, it may save your life. You will discuss what type of surgery you will have with your health care provider. This will depend on your health problem. If you have any questions or concerns, let your health care provider know.  Reaching the uterus  There are several ways to reach the uterus to remove it. The best approach depends on the reason for your surgery. The 3 main approaches are described below:    Vaginal. An incision is made inside the vagina. The uterus is then removed through this incision. This can be done if the uterus is not too large.    Laparoscopic. A thin tube with a camera and a light on the end is used. This is called a laparoscope. The doctor makes 2 to 4 small incisions in the abdomen. The scope is put through 1 of the incisions. The scope sends live pictures to a video screen. This allows the doctor see inside the abdomen. Surgical tools are placed through the other small incisions. The uterus can be removed through these incisions or through an incision made in the vagina. One of the following procedures may be done:    The uterus is removed through a small incision in the vagina. This is called  LAVH.     The uterus is removed through the small incisions in the abdomen. The cervix is left in place. This is called  LSH.     The uterus and cervix are removed through the small incisions in the abdomen. This is  called  TLH.     During any of these procedures, robotic technique may be used. This assists the surgeon s vision and hand movements.    Abdominal. One incision of 4 to 6 inches is made in the abdomen. The uterus is removed through this incision.   Types of Hysterectomy     Total hysterectomy       Subtotal hysterectomy       Hysterectomy with salpingo-oophorectomy      When the uterus is removed, the cervix may be left in place. Or it may also be removed. If it s removed, the top of the vagina is closed. In certain cases, the ovaries and fallopian tubes are also removed.     Removing the uterus. During a total (simple) hysterectomy, the uterus and cervix are removed. During a subtotal hysterectomy, only the uterus is removed. The cervix is left in place. This is also called a supracervical hysterectomy. In either case, the ovaries and fallopian tubes remain. If you have not yet reached menopause, the ovaries will keep making hormones. You may still feel the changes of menstrual cycles. But you will not have periods and can t become pregnant.    Removing the uterus, ovaries, and tubes. Along with the uterus, the ovaries and fallopian tubes may also be removed. This is called a hysterectomy with salpingo-oophorectomy. It causes the body s estrogen levels to drop quickly. This is called  surgical  menopause. Women who have not reached menopause before surgery may have sudden symptoms. But these symptoms can be treated.   Deciding on hysterectomy  You and your doctor can discuss the best options for you. As you decide, your doctor may ask you to think about the following:    Is your health problem getting in the way of your daily life? Is the problem getting worse? If not, might other treatments be tried first?    Do you want to have children? If so, other treatments should be considered.    Should the fallopian tubes be removed too? This will reduce the changes of ovarian cancer since we  now know that many ovarian  "cancers actually from the the tubes.    Should the ovaries be removed too? This is often done to treat or prevent cancer. If removal is needed, talk to your doctor about hormone therapy.  Risks and possible complications of a hysterectomy include    Side effects from the anesthesia    Infection    Bleeding, with a possible need for transfusion    Damage to nearby organs (bladder, bowel, ureters, or nearby nerves or blood vessels)    Blood clots in the legs or lungs    Formation of scar tissue that may cause pain or bowel obstruction in the future. This is more common with the abdominal approach.    Need for second surgery   Date Last Reviewed: 5/13/2015 2000-2017 [employer]. [address] [place] [address]. All rights reserved. This information is not intended as a substitute for professional medical care. Always follow your healthcare professional's instructions.                Follow-ups after your visit        Who to contact     If you have questions or need follow up information about today's clinic visit or your schedule please contact [facility] directly at [phone].  Normal or non-critical lab and imaging results will be communicated to you by [employer], letter or phone within 4 business days after the clinic has received the results. If you do not hear from us within 7 days, please contact the clinic through [employer] or phone. If you have a critical or abnormal lab result, we will notify you by phone as soon as possible.  Submit refill requests through [employer] or call your pharmacy and they will forward the refill request to us. Please allow 3 business days for your refill to be completed.          Additional Information About Your Visit        [employer] Information     [employer] lets you send messages to your doctor, view your test results, renew your prescriptions, schedule appointments and more. To sign up, go to [email or web address] . Click on \"Log in\" " "on the left side of the screen, which will take you to the Welcome page. Then click on \"Sign up Now\" on the right side of the page.     You will be asked to enter the access code listed below, as well as some personal information. Please follow the directions to create your username and password.     Your access code is: [email or web address]  Expires: 2018 10:32 AM     Your access code will  in 90 days. If you need help or a new code, please call your [facility] or [phone].        Care EveryWhere ID     This is your Care EveryWhere ID. This could be used by other organizations to access your [place] medical records  [id number]        Your Vitals Were     Pulse Last Period Breastfeeding? BMI (Body Mass Index)          76 03/10/2018 No 33.09 kg/m2         Blood Pressure from Last 3 Encounters:   04/10/18 128/84   18 136/78   16 136/80    Weight from Last 3 Encounters:   04/10/18 89.5 kg (197 lb 4.8 oz)   18 89.5 kg (197 lb 6.4 oz)   16 90.3 kg (199 lb)              Today, you had the following     No orders found for display       Primary Care Provider Office Phone # Fax #    Bong Che [phone] [address] [facility] [address]        Equal Access to Services     [facility] AH: Hadii aad ku hadasho Soparmjitali, waaxda luqadaha, qaybta kaalmada adeegyada, jerod [place] . So [facility] [phone].    ATENCIÓN: Si habla español, tiene a shebly disposición servicios gratuitos de asistencia lingüística. Llame al [phone].    We comply with applicable federal civil rights laws and [place] laws. We do not discriminate on the basis of race, color, national origin, age, disability, sex, sexual orientation, or gender identity.            Thank you!     Thank you for choosing [facility] AND [facility]  for your care. Our goal is always to provide you with excellent care. Hearing back from our patients is one way we " can continue to improve our services. Please take a few minutes to complete the written survey that you may receive in the mail after your visit with us. Thank you!             Your Updated Medication List - Protect others around you: Learn how to safely use, store and throw away your medicines at www.disposemymeds.org.          This list is accurate as of 4/10/18 11:40 AM.  Always use your most recent med list.                   Brand Name Dispense Instructions for use Diagnosis    famotidine 20 MG tablet    PEPCID    180 tablet    Take 1 tablet by mouth 2 times daily.    Gastroesophageal reflux disease with esophagitis       pantoprazole 40 MG EC tablet    PROTONIX    90 tablet    Take 1 tablet by mouth once daily.    Hiatal hernia

## 2018-06-12 ENCOUNTER — OFFICE VISIT (OUTPATIENT)
Dept: OBGYN | Facility: OTHER | Age: 43
End: 2018-06-12
Attending: OBSTETRICS & GYNECOLOGY
Payer: COMMERCIAL

## 2018-06-12 VITALS
SYSTOLIC BLOOD PRESSURE: 144 MMHG | DIASTOLIC BLOOD PRESSURE: 88 MMHG | WEIGHT: 202.3 LBS | BODY MASS INDEX: 33.92 KG/M2 | HEART RATE: 84 BPM

## 2018-06-12 DIAGNOSIS — N83.202 BILATERAL OVARIAN CYSTS: ICD-10-CM

## 2018-06-12 DIAGNOSIS — N92.0 EXCESSIVE OR FREQUENT MENSTRUATION: Primary | ICD-10-CM

## 2018-06-12 DIAGNOSIS — N83.201 BILATERAL OVARIAN CYSTS: ICD-10-CM

## 2018-06-12 PROCEDURE — 99213 OFFICE O/P EST LOW 20 MIN: CPT | Performed by: OBSTETRICS & GYNECOLOGY

## 2018-06-12 ASSESSMENT — PAIN SCALES - GENERAL: PAINLEVEL: NO PAIN (0)

## 2018-06-12 NOTE — LETTER
6/12/2018       RE: Yanna Ordonez  [address] [facility] [place] [address]     Dear Colleague,    Thank you for referring your patient, Yanna Ordonez, to the [facility] at [facility]. Please see a copy of my visit note below.    CC: menorrhagia    HPI: Yanna Ordonez presents for irregular and heavy periods. She had US and pipelle biopsy done. Biopsy was benign. US showed bilateral ovarian cysts with some internal echo. No prior pelvic infection or surgery. Rare stress incontinence noted, some dyspareunia. NO bowel, or breast issues. She has completed childbearing and wishes to not be on hormones due to associated side effects and risks.    Past Medical History:   Diagnosis Date     Personal history of other medical treatment (CODE)     Childbirth     Past Surgical History:   Procedure Laterality Date     DILATION AND CURETTAGE      1992,after SAB       Current Outpatient Prescriptions   Medication     famotidine (PEPCID) 20 MG tablet     pantoprazole (PROTONIX) 40 MG EC tablet     No current facility-administered medications for this visit.        REVIEW OF SYSTEMS  General: negative    Exam:  Gen: NAD    Results for orders placed or performed during the hospital encounter of 04/04/18   US Pelvic Complete with Transvaginal    Narrative    PROCEDURE: US PELVIC COMPLETE WITH TRANSVAGINAL 4/4/2018 10:15 AM    HISTORY: abnormal vaginal bleeding for 3-4 months; Excessive or  frequent menstruation    COMPARISONS: None.    TECHNIQUE: Transabdominal and endovaginal imaging.    FINDINGS: Uterus measures 8.9 x 5.4 x 4.7 cm. Endometrial stripe  measures 8 mm. No focal uterine fibroid is seen but there are small  nabothian cysts.    Both ovaries are visualized. Right ovary measures 2.1 x 2.5 x 2.3 cm.  It contains a 1.9 cm complex cystic lesion with linear internal  echoes.    Left ovary measures 4.4 x 3.3 x 3.6 cm. It also contains a complex  cystic  lesion. This measures 2.8 x 2.7 cm in size. Other left-sided  follicles are also visualized. No free fluid is seen.         Impression    IMPRESSION: Complex cystic lesions in both ovaries, probably  hemorrhagic cysts or follicles.    SABI EDMONDSON MD       I/P:  (N92.0) Excessive or frequent menstruation  (primary encounter diagnosis)  Comment:   Plan:     (N83.201,  N83.202) Bilateral ovarian cysts  Comment:   Plan: Repeat US  Plan for vaginal hysterectomy and salpingectomy, possible ovarian cystectomy in August.  She is due for her annual and will have her see her PCP for preop evaluation.  Discussed risks, benefits and alternatives of surgery, and she wishes to proceed.    Demarco Verdugo MD FACOG  11:13 AM 6/12/2018

## 2018-06-12 NOTE — PROGRESS NOTES
CC: menorrhagia    HPI: Yanna Ordonez presents for irregular and heavy periods. She had US and pipelle biopsy done. Biopsy was benign. US showed bilateral ovarian cysts with some internal echo. No prior pelvic infection or surgery. Rare stress incontinence noted, some dyspareunia. NO bowel, or breast issues. She has completed childbearing and wishes to not be on hormones due to associated side effects and risks.    Past Medical History:   Diagnosis Date     Personal history of other medical treatment (CODE)     Childbirth     Past Surgical History:   Procedure Laterality Date     DILATION AND CURETTAGE      1992,after SAB       Current Outpatient Prescriptions   Medication     famotidine (PEPCID) 20 MG tablet     pantoprazole (PROTONIX) 40 MG EC tablet     No current facility-administered medications for this visit.        REVIEW OF SYSTEMS  General: negative    Exam:  Gen: NAD    Results for orders placed or performed during the hospital encounter of 04/04/18   US Pelvic Complete with Transvaginal    Narrative    PROCEDURE: US PELVIC COMPLETE WITH TRANSVAGINAL 4/4/2018 10:15 AM    HISTORY: abnormal vaginal bleeding for 3-4 months; Excessive or  frequent menstruation    COMPARISONS: None.    TECHNIQUE: Transabdominal and endovaginal imaging.    FINDINGS: Uterus measures 8.9 x 5.4 x 4.7 cm. Endometrial stripe  measures 8 mm. No focal uterine fibroid is seen but there are small  nabothian cysts.    Both ovaries are visualized. Right ovary measures 2.1 x 2.5 x 2.3 cm.  It contains a 1.9 cm complex cystic lesion with linear internal  echoes.    Left ovary measures 4.4 x 3.3 x 3.6 cm. It also contains a complex  cystic lesion. This measures 2.8 x 2.7 cm in size. Other left-sided  follicles are also visualized. No free fluid is seen.         Impression    IMPRESSION: Complex cystic lesions in both ovaries, probably  hemorrhagic cysts or follicles.    SABI EDMONDSON MD       I/P:  (N92.0) Excessive or frequent  menstruation  (primary encounter diagnosis)  Comment:   Plan:     (N83.201,  N83.202) Bilateral ovarian cysts  Comment:   Plan: Repeat US  Plan for vaginal hysterectomy and salpingectomy, possible ovarian cystectomy in August.  She is due for her annual and will have her see her PCP for preop evaluation.  Discussed risks, benefits and alternatives of surgery, and she wishes to proceed.    Demarco Verdugo MD FACOG  11:13 AM 6/12/2018

## 2018-06-12 NOTE — MR AVS SNAPSHOT
"              After Visit Summary   2018    Yanna Ordonez    MRN: [id number]           Patient Information     Date Of Birth          1975        Visit Information        Provider Department      2018 10:45 AM Demarco Verdugo MD [facility]        Today's Diagnoses     Excessive or frequent menstruation    -  1    Bilateral ovarian cysts           Follow-ups after your visit        Who to contact     If you have questions or need follow up information about today's clinic visit or your schedule please contact [facility] directly at [phone].  Normal or non-critical lab and imaging results will be communicated to you by [employer], letter or phone within 4 business days after the clinic has received the results. If you do not hear from us within 7 days, please contact the clinic through [employer] or phone. If you have a critical or abnormal lab result, we will notify you by phone as soon as possible.  Submit refill requests through Klique or call your pharmacy and they will forward the refill request to us. Please allow 3 business days for your refill to be completed.          Additional Information About Your Visit        MyChart Information     Klique lets you send messages to your doctor, view your test results, renew your prescriptions, schedule appointments and more. To sign up, go to [email or web address] . Click on \"Log in\" on the left side of the screen, which will take you to the Welcome page. Then click on \"Sign up Now\" on the right side of the page.     You will be asked to enter the access code listed below, as well as some personal information. Please follow the directions to create your username and password.     Your access code is: [email or web address]  Expires: 9/10/2018 10:38 AM     Your access code will  in 90 days. If you need help or a new code, please call your [place] clinic or [phone].        Care EveryWhere ID     This is " your Care EveryWhere ID. This could be used by other organizations to access your [place] medical records  [id number]        Your Vitals Were     Pulse Last Period Breastfeeding? BMI (Body Mass Index)          84 06/02/2018 No 33.92 kg/m2         Blood Pressure from Last 3 Encounters:   06/12/18 144/88   04/10/18 128/84   03/21/18 136/78    Weight from Last 3 Encounters:   06/12/18 91.8 kg (202 lb 4.8 oz)   04/10/18 89.5 kg (197 lb 4.8 oz)   03/21/18 89.5 kg (197 lb 6.4 oz)              Today, you had the following     No orders found for display       Primary Care Provider Office Phone # Fax #    Bong Che [phone]       [address] [facility] [address]        Equal Access to Services     [facility]: Hadii aad ku hadasho Soinez, waaxda luqadaha, qaybta kaalmada adetimuryada, jerod livingston . So [facility] [phone].    ATENCIÓN: Si habla español, tiene a shelby disposición servicios gratuitos de asistencia lingüística. Llame al [phone].    We comply with applicable federal civil rights laws and [place] laws. We do not discriminate on the basis of race, color, national origin, age, disability, sex, sexual orientation, or gender identity.            Thank you!     Thank you for choosing [facility]  for your care. Our goal is always to provide you with excellent care. Hearing back from our patients is one way we can continue to improve our services. Please take a few minutes to complete the written survey that you may receive in the mail after your visit with us. Thank you!             Your Updated Medication List - Protect others around you: Learn how to safely use, store and throw away your medicines at www.disposemymeds.org.          This list is accurate as of 6/12/18 11:20 AM.  Always use your most recent med list.                   Brand Name Dispense Instructions for use Diagnosis    famotidine 20 MG tablet    PEPCID     180 tablet    Take 1 tablet by mouth 2 times daily.    Gastroesophageal reflux disease with esophagitis       pantoprazole 40 MG EC tablet    PROTONIX    90 tablet    Take 1 tablet by mouth once daily.    Hiatal hernia

## 2018-06-12 NOTE — NURSING NOTE
Date of Surgery: August 8, 2018.  Type of Surgery: Vaginal Hysterectomy with Bilateral Salpingectomy.  Surgeon: Dr. SALOMÓN Verdugo    Patient's consents were signed and appropriate appointments were scheduled by the Unit [address] . Patient was given surgical folder.  Surgical forms were faxed to [phone] and [phone], copies made and kept for informative purposes, and originals were delivered to Day-surgery. Patient denies questions at this time.      Lizzy King............. 6/12/2018 11:23 AM

## 2018-07-31 ENCOUNTER — OFFICE VISIT (OUTPATIENT)
Dept: FAMILY MEDICINE | Facility: OTHER | Age: 43
End: 2018-07-31
Attending: FAMILY MEDICINE
Payer: COMMERCIAL

## 2018-07-31 VITALS
HEIGHT: 65 IN | BODY MASS INDEX: 33.49 KG/M2 | HEART RATE: 76 BPM | SYSTOLIC BLOOD PRESSURE: 120 MMHG | WEIGHT: 201 LBS | DIASTOLIC BLOOD PRESSURE: 82 MMHG

## 2018-07-31 DIAGNOSIS — N92.0 EXCESSIVE OR FREQUENT MENSTRUATION: Primary | ICD-10-CM

## 2018-07-31 DIAGNOSIS — K21.00 GASTROESOPHAGEAL REFLUX DISEASE WITH ESOPHAGITIS: ICD-10-CM

## 2018-07-31 DIAGNOSIS — K44.9 HIATAL HERNIA: ICD-10-CM

## 2018-07-31 DIAGNOSIS — Z01.810 PRE-OPERATIVE CARDIOVASCULAR EXAMINATION: ICD-10-CM

## 2018-07-31 LAB
ANION GAP SERPL CALCULATED.3IONS-SCNC: 10 MMOL/L (ref 3–14)
BUN SERPL-MCNC: 17 MG/DL (ref 7–25)
CALCIUM SERPL-MCNC: 9.6 MG/DL (ref 8.6–10.3)
CHLORIDE SERPL-SCNC: 101 MMOL/L (ref 98–107)
CO2 SERPL-SCNC: 25 MMOL/L (ref 21–31)
CREAT SERPL-MCNC: 0.77 MG/DL (ref 0.6–1.2)
ERYTHROCYTE [DISTWIDTH] IN BLOOD BY AUTOMATED COUNT: 13.4 % (ref 10–15)
GFR SERPL CREATININE-BSD FRML MDRD: 81 ML/MIN/1.7M2
GLUCOSE SERPL-MCNC: 96 MG/DL (ref 70–105)
HCT VFR BLD AUTO: 38.4 % (ref 35–47)
HGB BLD-MCNC: 12.7 G/DL (ref 11.7–15.7)
MCH RBC QN AUTO: 28.2 PG (ref 26.5–33)
MCHC RBC AUTO-ENTMCNC: 33.1 G/DL (ref 31.5–36.5)
MCV RBC AUTO: 85 FL (ref 78–100)
PLATELET # BLD AUTO: 300 10E9/L (ref 150–450)
POTASSIUM SERPL-SCNC: 3.8 MMOL/L (ref 3.5–5.1)
RBC # BLD AUTO: 4.51 10E12/L (ref 3.8–5.2)
SODIUM SERPL-SCNC: 136 MMOL/L (ref 134–144)
WBC # BLD AUTO: 8.5 10E9/L (ref 4–11)

## 2018-07-31 PROCEDURE — 36415 COLL VENOUS BLD VENIPUNCTURE: CPT | Performed by: FAMILY MEDICINE

## 2018-07-31 PROCEDURE — 80048 BASIC METABOLIC PNL TOTAL CA: CPT | Performed by: FAMILY MEDICINE

## 2018-07-31 PROCEDURE — 85027 COMPLETE CBC AUTOMATED: CPT | Performed by: FAMILY MEDICINE

## 2018-07-31 PROCEDURE — 99214 OFFICE O/P EST MOD 30 MIN: CPT | Mod: 25 | Performed by: FAMILY MEDICINE

## 2018-07-31 PROCEDURE — 93010 ELECTROCARDIOGRAM REPORT: CPT | Performed by: INTERNAL MEDICINE

## 2018-07-31 RX ORDER — PANTOPRAZOLE SODIUM 40 MG/1
TABLET, DELAYED RELEASE ORAL
Qty: 90 TABLET | Refills: 3 | Status: SHIPPED | OUTPATIENT
Start: 2018-07-31 | End: 2021-01-06

## 2018-07-31 RX ORDER — FAMOTIDINE 20 MG/1
20 TABLET, FILM COATED ORAL 2 TIMES DAILY
Qty: 180 TABLET | Refills: 3 | Status: SHIPPED | OUTPATIENT
Start: 2018-07-31 | End: 2021-01-06

## 2018-07-31 ASSESSMENT — ANXIETY QUESTIONNAIRES
GAD7 TOTAL SCORE: 5
2. NOT BEING ABLE TO STOP OR CONTROL WORRYING: SEVERAL DAYS
7. FEELING AFRAID AS IF SOMETHING AWFUL MIGHT HAPPEN: NOT AT ALL
IF YOU CHECKED OFF ANY PROBLEMS ON THIS QUESTIONNAIRE, HOW DIFFICULT HAVE THESE PROBLEMS MADE IT FOR YOU TO DO YOUR WORK, TAKE CARE OF THINGS AT HOME, OR GET ALONG WITH OTHER PEOPLE: NOT DIFFICULT AT ALL
5. BEING SO RESTLESS THAT IT IS HARD TO SIT STILL: NOT AT ALL
6. BECOMING EASILY ANNOYED OR IRRITABLE: SEVERAL DAYS
1. FEELING NERVOUS, ANXIOUS, OR ON EDGE: SEVERAL DAYS
3. WORRYING TOO MUCH ABOUT DIFFERENT THINGS: SEVERAL DAYS

## 2018-07-31 ASSESSMENT — PATIENT HEALTH QUESTIONNAIRE - PHQ9: 5. POOR APPETITE OR OVEREATING: SEVERAL DAYS

## 2018-07-31 NOTE — MR AVS SNAPSHOT
After Visit Summary   7/31/2018    Yanna Ordonez    MRN: [id number]           Patient Information     Date Of Birth          1975        Visit Information        Provider Department      7/31/2018 4:00 PM Bong Che MD [facility]        Today's Diagnoses     Excessive or frequent menstruation    -  1    Hiatal hernia        Gastroesophageal reflux disease with esophagitis        Pre-operative cardiovascular examination           Follow-ups after your visit        Your next 10 appointments already scheduled     Aug 08, 2018   [facility]    [address]  [place] [address]   [phone]            Aug 27, 2018 10:15 AM CDT   SHORT with Demarco Verdugo MD   [facility]    [address]  [place] [address]   [phone]            Sep 20, 2018  3:45 PM CDT   Office Visit with Demarco Verdugo MD   [facility]    [address]  [place] [address]   [phone]           Bring a current list of meds and any records pertaining to this visit. For Physicals, please bring immunization records and any forms needing to be filled out. Please arrive 10 minutes early to complete paperwork.              Who to contact     If you have questions or need follow up information about today's clinic visit or your schedule please contact [facility] directly at [phone].  Normal or non-critical lab and imaging results will be communicated to you by MyChart, letter or phone within 4 business days after the clinic has received the results. If you do not hear from us within 7 days, please contact the clinic through MyChart or phone. If you have a critical or abnormal lab result, we will notify you by phone as soon as possible.  Submit  "refill requests through Zazoo or call your pharmacy and they will forward the refill request to us. Please allow 3 business days for your refill to be completed.          Additional Information About Your Visit        LocoX.comhart Information     Zazoo gives you secure access to your electronic health record. If you see a primary care provider, you can also send messages to your care team and make appointments. If you have questions, please call your primary care clinic.  If you do not have a primary care provider, please call [phone] and they will assist you.        Care EveryWhere ID     This is your Care EveryWhere ID. This could be used by other organizations to access your [place] medical records  [id number]        Your Vitals Were     Pulse Height BMI (Body Mass Index)             76 5' 5\" (1.651 m) 33.45 kg/m2          Blood Pressure from Last 3 Encounters:   07/31/18 120/82   06/12/18 144/88   04/10/18 128/84    Weight from Last 3 Encounters:   07/31/18 201 lb (91.2 kg)   06/12/18 202 lb 4.8 oz (91.8 kg)   04/10/18 197 lb 4.8 oz (89.5 kg)              We Performed the Following     Basic Metabolic Panel     CBC W PLT No Diff     EKG 12-LEAD CLINIC READ          Today's Medication Changes          These changes are accurate as of 7/31/18 11:59 PM.  If you have any questions, ask your nurse or doctor.               These medicines have changed or have updated prescriptions.        Dose/Directions    famotidine 20 MG tablet   Commonly known as:  PEPCID   This may have changed:  See the new instructions.   Used for:  Gastroesophageal reflux disease with esophagitis   Changed by:  Bong Che MD        Dose:  20 mg   Take 1 tablet (20 mg) by mouth 2 times daily   Quantity:  180 tablet   Refills:  3            Where to get your medicines      These medications were sent to [facility], - [place], [place] - [address]  [address], [place] [address]     " Phone:  [phone]     famotidine 20 MG tablet    pantoprazole 40 MG EC tablet                Primary Care Provider Office Phone # Fax #    Bong Che [phone] [address]  [place] [address]        Equal Access to Services     [facility]: Hadii aad ku hadasho Soomaali, waaxda luqadaha, qaybta kaalmada adeegyada, waxay renein haymaryamn keli nelsonyenabebe almazan. So [facility] [phone].    ATENCIÓN: Si habla español, tiene a shelby disposición servicios gratuitos de asistencia lingüística. Llame al [phone].    We comply with applicable federal civil rights laws and [place] laws. We do not discriminate on the basis of race, color, national origin, age, disability, sex, sexual orientation, or gender identity.            Thank you!     Thank you for choosing [facility]  for your care. Our goal is always to provide you with excellent care. Hearing back from our patients is one way we can continue to improve our services. Please take a few minutes to complete the written survey that you may receive in the mail after your visit with us. Thank you!             Your Updated Medication List - Protect others around you: Learn how to safely use, store and throw away your medicines at www.disposemymeds.org.          This list is accurate as of 7/31/18 11:59 PM.  Always use your most recent med list.                   Brand Name Dispense Instructions for use Diagnosis    famotidine 20 MG tablet    PEPCID    180 tablet    Take 1 tablet (20 mg) by mouth 2 times daily    Gastroesophageal reflux disease with esophagitis       pantoprazole 40 MG EC tablet    PROTONIX    90 tablet    Take 1 tablet by mouth once daily.    Gastroesophageal reflux disease with esophagitis

## 2018-07-31 NOTE — PROGRESS NOTES
"----------------- PREOPERATIVE EXAM ------------------  Nursing Notes:   Nahed Hernandez LPN  7/31/2018  4:22 PM  Signed  Date of Surgery: 8/8/2018  Type of Surgery: Hysterectomy  Surgeon: [facility]:  [place]  Fax:     Fever/Chills or otherinfectious symptoms in past month: No  >10lb weight loss in past two months: No    Health Care Directive/Code status:  Full Code  Hx of bloodtransfusions:   No   Td up to date:  Yes  History of VRE/MRSA:  No Date:     Preoperative Evaluation: Obstructive Sleep Apnea screening    S:Snore -  Do you snore loudly? (louder than talking or loud enough to be heard through closed doors)No  T: Tired - Do you often feeltired, fatigued, or sleepy during the daytime?No  O: Observed - Has anyone ever observed you stop breathing during your sleep?No  P: Pressure - Do you have or areyou being treated for high blood pressure?No  B: BMI - BMI greater than 35kg/m2?No  A: Age - Age over 50 years old?No  N: Neck - Neck circumferencegreater than 40 cm?No  G:Gender - Gender: Male?No    Totalnumber of \"YES\" responses:  0    Scoring: Low risk of DARRON 0-2  At Risk of DARRON: >3 High Risk ofOSA: 5-8    Nahed Hernandez LPN....................  7/31/2018   4:19 PM        7/31/2018    SUBJECTIVE:  Yanna Ordonez is a 43 year old female here for preoperative optimization.    I was asked to see Yanna Ordonez by Dr. Verdugo for  preoperative evaluation due to tobacco use and GERD.  She has been smoking 1/4-1/2ppd just recently (10-15pk yr hx).   Patient reports that she feels well but continues to have excessive menstruation.  No breakthrough acid reflux.  She denies any chest pain or shortness of breath.  No anginal equivalents.  They recently moved up to [facility] and she remains very active outdoors.  Has been kayaking and fishing and enjoying the wilderness without any cardiopulmonary symptoms.  She has not had fevers or chills.  No other symptoms concerning for infection    Patient Active " Problem List   Diagnosis     Carpal tunnel syndrome, bilateral     Hiatal hernia     Neck pain, chronic     Excessive or frequent menstruation     Lightheadedness       Past Medical History:   Diagnosis Date     Personal history of other medical treatment (CODE)     Childbirth       Past Surgical History:   Procedure Laterality Date     DILATION AND CURETTAGE      1992,after SAB       Current Outpatient Prescriptions   Medication Sig Dispense Refill     famotidine (PEPCID) 20 MG tablet Take 1 tablet (20 mg) by mouth 2 times daily 180 tablet 3     pantoprazole (PROTONIX) 40 MG EC tablet Take 1 tablet by mouth once daily. 90 tablet 3         Allergies:   No Known Allergies    Latex allergy  No  Immunizations:  Immunization History   Administered Date(s) Administered     DTAP (<7y) 1975, 1975, 11/02/1978, 07/11/1979     HepB-Adult 03/14/2016, 04/11/2016, 07/11/2016     Influenza (High Dose) 3 valent vaccine 10/09/2013     Influenza (IIV3) PF 10/06/2014     Influenza Vaccine IM 3yrs+ 4 Valent IIV4 09/01/2015, 11/06/2017     MMR 08/25/1978, 08/04/2016     Polio, Unspecified  1973, 11/02/1978, 07/11/1979     TD (ADULT, 7+) 09/27/2005     TDAP Vaccine (Boostrix) 08/31/2016         Family History   Problem Relation Age of Onset     Other - See Comments Mother 65     Celiac disease     Other - See Comments Father      Glaucoma     Family History Negative Sister      Good Health     Family History Negative Sister      Good Health     Family History Negative Brother      Good Health     Family History Negative Brother      Good Health,Twin     Family History Negative Brother      Good Health,Twin - Had GIB     HEART DISEASE No family hx of      Heart Disease,No known premature CAD     Diabetes No family hx of      Diabetes,DM2     Breast Cancer No family hx of      Cancer-breast     Colon Cancer No family hx of      Cancer-colon         Deniesfamily hx of bleeding tendencies, anesthesia complications, or  "other problems with surgery.    Social History     Social History     Marital status:      Spouse name: Jitendra     Number of children: N/A     Years of education: N/A     Occupational History     Not on file.     Social History Main Topics     Smoking status: Current Every Day Smoker     Packs/day: 0.50     Types: Cigarettes     Last attempt to quit: 10/1/2015     Smokeless tobacco: Never Used      Comment: Quit smoking: Had quit for 6 years and started again about a month ago     Alcohol use No     Drug use: No      Comment: Drug use: No     Sexual activity: Yes     Partners: Male     Birth control/ protection: None      Comment: Birth control method: Spouse vasectomy     Other Topics Concern     Not on file     Social History Narrative    Lives with  and three children ,   [place], 1993 -  at [employer], 1996 -  at [employer], 1998 - Graduated.   Works at [employer].         ROS:    Surgical:  patient denies previous complications from prior surgeries including but not limitedto prolonged bleeding, anesthesia complications, dysrhythmias, surgical wound infections, or prolonged hospital stay.  Cardiorespiratory: denies chest pain, palpitations, shortness of breath, cough. Most exertion in thepast 2 weeks was kayaking for an hour.   Complete ROS otherwise negative except as noted in HPI.     -------------------------------------------------------------    PHYSICAL EXAM:  /82  Pulse 76  Ht 5' 5\" (1.651 m)  Wt 201 lb (91.2 kg)  BMI 33.45 kg/m2      EXAM:  General Appearance:Pleasant, alert, appropriate appearance for age. No acute distress  Head Exam: Normal. Normocephalic, atraumatic.  Eyes: PERRL, EOMI  Ears: Normal TM's bilaterally.   OroPharynx: Mucosa pink and moist. Dentition in good repair. Yes  Neck: Supple, no masses or nodes, no lymphadenopathy.  No thyromegaly.  Lungs: Normal chest wall and respirations. Clear to auscultation, no wheezes " or crackles.  Cardiovascular: Regular rate andrhythm. S1, S2, no murmurs.  Gastrointestinal: Soft, nontender, no abnormal masses or organomegaly. BS normal   Musculoskeletal: No edema. No warm or erythematous joints.  Skin: no concerning or new rashes.  Neurologic Exam: CN 2-12 grossly intact.  Normal gait.  Symmetric DTRs, normal gross motor movement, tone, and coordination. No tremor.  Psychiatric Exam: Alert and oriented, appropriate affect.      EKG:  NSR.     ---------------------------------------------------------------    ASSESSEMENT AND PLAN:     (N92.0) Excessive or frequent menstruation  (primary encounter diagnosis)  Comment: Persistent  Plan: CBC W PLT No Diff        Hgb remains normal.      (K44.9) Hiatal hernia  Comment: symptoms controlled with PPI  Plan: continue protonix    (K21.0) Gastroesophageal reflux disease with esophagitis  Comment: symptoms controlled with PPI  Plan: pantoprazole (PROTONIX) 40 MG EC tablet,         famotidine (PEPCID) 20 MG tablet        Continue protonix    (Z01.810) Pre-operative cardiovascular examination  Comment: No perioperative issues identified  Plan: Basic Metabolic Panel, EKG 12-LEAD CLINIC READ                PRE OP RECOMMENDATIONS:    No family history of problems with bleeding or anesthesia. Patient is able to tolerate greater than 4 METs of activity without any cardiopulmonary symptoms. ASA PS class 2 . Nocardiopulmonary workup is neccessary for the current procedure. Please contact the office with any questions or concerns.    Patient is onchronic pain medications:No  Patient is on antiplatlet/anticoagulation:No  Other medications that need adjustment perioperatively:No    Other:  Patient was advised tocall our office and the surgical services with any change in condition or new symptoms if they were to develop between today and their surgical date; especially any cardiopulmonary symptoms or symptoms concerning for aninfection.

## 2018-07-31 NOTE — NURSING NOTE
"Date of Surgery: 8/8/2018  Type of Surgery: Hysterectomy  Surgeon: [facility]:  [place]  Fax:     Fever/Chills or otherinfectious symptoms in past month: No  >10lb weight loss in past two months: No    Health Care Directive/Code status:  Full Code  Hx of bloodtransfusions:   No   Td up to date:  Yes  History of VRE/MRSA:  No Date:     Preoperative Evaluation: Obstructive Sleep Apnea screening    S:Snore -  Do you snore loudly? (louder than talking or loud enough to be heard through closed doors)No  T: Tired - Do you often feeltired, fatigued, or sleepy during the daytime?No  O: Observed - Has anyone ever observed you stop breathing during your sleep?No  P: Pressure - Do you have or areyou being treated for high blood pressure?No  B: BMI - BMI greater than 35kg/m2?No  A: Age - Age over 50 years old?No  N: Neck - Neck circumferencegreater than 40 cm?No  G:Gender - Gender: Male?No    Totalnumber of \"YES\" responses:  0    Scoring: Low risk of DARRON 0-2  At Risk of DARRON: >3 High Risk ofOSA: 5-8    Nahed Hernandez LPN....................  7/31/2018   4:19 PM    "

## 2018-08-01 ASSESSMENT — ANXIETY QUESTIONNAIRES: GAD7 TOTAL SCORE: 5

## 2018-08-01 ASSESSMENT — PATIENT HEALTH QUESTIONNAIRE - PHQ9: SUM OF ALL RESPONSES TO PHQ QUESTIONS 1-9: 0

## 2018-08-07 ENCOUNTER — ANESTHESIA EVENT (OUTPATIENT)
Dept: SURGERY | Facility: OTHER | Age: 43
End: 2018-08-07
Payer: COMMERCIAL

## 2018-08-08 ENCOUNTER — ANESTHESIA (OUTPATIENT)
Dept: SURGERY | Facility: OTHER | Age: 43
End: 2018-08-08
Payer: COMMERCIAL

## 2018-08-08 ENCOUNTER — SURGERY (OUTPATIENT)
Age: 43
End: 2018-08-08

## 2018-08-08 ENCOUNTER — HOSPITAL ENCOUNTER (OUTPATIENT)
Facility: OTHER | Age: 43
Discharge: HOME | End: 2018-08-09
Attending: OBSTETRICS & GYNECOLOGY | Admitting: OBSTETRICS & GYNECOLOGY
Payer: COMMERCIAL

## 2018-08-08 DIAGNOSIS — Z90.710 S/P HYSTERECTOMY WITH OOPHORECTOMY: Primary | ICD-10-CM

## 2018-08-08 DIAGNOSIS — Z90.710 S/P HYSTERECTOMY: ICD-10-CM

## 2018-08-08 DIAGNOSIS — Z90.721 S/P HYSTERECTOMY WITH OOPHORECTOMY: Primary | ICD-10-CM

## 2018-08-08 LAB
ABO + RH BLD: NORMAL
ABO + RH BLD: NORMAL
BASOPHILS # BLD AUTO: 0.1 10E9/L (ref 0–0.2)
BASOPHILS NFR BLD AUTO: 1.2 %
BLD GP AB SCN SERPL QL: NORMAL
BLOOD BANK CMNT PATIENT-IMP: NORMAL
DIFFERENTIAL METHOD BLD: NORMAL
EOSINOPHIL # BLD AUTO: 0.4 10E9/L (ref 0–0.7)
EOSINOPHIL NFR BLD AUTO: 7.2 %
ERYTHROCYTE [DISTWIDTH] IN BLOOD BY AUTOMATED COUNT: 13.2 % (ref 10–15)
HCG UR QL: NEGATIVE
HCT VFR BLD AUTO: 38.6 % (ref 35–47)
HGB BLD-MCNC: 12.9 G/DL (ref 11.7–15.7)
IMM GRANULOCYTES # BLD: 0 10E9/L (ref 0–0.4)
IMM GRANULOCYTES NFR BLD: 0.4 %
LYMPHOCYTES # BLD AUTO: 1.7 10E9/L (ref 0.8–5.3)
LYMPHOCYTES NFR BLD AUTO: 33.3 %
MCH RBC QN AUTO: 28.5 PG (ref 26.5–33)
MCHC RBC AUTO-ENTMCNC: 33.4 G/DL (ref 31.5–36.5)
MCV RBC AUTO: 85 FL (ref 78–100)
MONOCYTES # BLD AUTO: 0.3 10E9/L (ref 0–1.3)
MONOCYTES NFR BLD AUTO: 6 %
NEUTROPHILS # BLD AUTO: 2.7 10E9/L (ref 1.6–8.3)
NEUTROPHILS NFR BLD AUTO: 51.9 %
PLATELET # BLD AUTO: 264 10E9/L (ref 150–450)
RBC # BLD AUTO: 4.52 10E12/L (ref 3.8–5.2)
SPECIMEN EXP DATE BLD: NORMAL
WBC # BLD AUTO: 5.2 10E9/L (ref 4–11)

## 2018-08-08 PROCEDURE — 71000014 ZZH RECOVERY PHASE 1 LEVEL 2 FIRST HR: Performed by: OBSTETRICS & GYNECOLOGY

## 2018-08-08 PROCEDURE — 25000128 H RX IP 250 OP 636: Performed by: NURSE ANESTHETIST, CERTIFIED REGISTERED

## 2018-08-08 PROCEDURE — 36415 COLL VENOUS BLD VENIPUNCTURE: CPT | Performed by: OBSTETRICS & GYNECOLOGY

## 2018-08-08 PROCEDURE — 25000132 ZZH RX MED GY IP 250 OP 250 PS 637: Performed by: OBSTETRICS & GYNECOLOGY

## 2018-08-08 PROCEDURE — 88307 TISSUE EXAM BY PATHOLOGIST: CPT | Performed by: OBSTETRICS & GYNECOLOGY

## 2018-08-08 PROCEDURE — 25000566 ZZH SEVOFLURANE, EA 15 MIN: Performed by: OBSTETRICS & GYNECOLOGY

## 2018-08-08 PROCEDURE — 58262 VAG HYST INCLUDING T/O: CPT | Performed by: NURSE ANESTHETIST, CERTIFIED REGISTERED

## 2018-08-08 PROCEDURE — 40000306 ZZH STATISTIC PRE PROC ASSESS II: Performed by: OBSTETRICS & GYNECOLOGY

## 2018-08-08 PROCEDURE — 27210794 ZZH OR GENERAL SUPPLY STERILE: Performed by: OBSTETRICS & GYNECOLOGY

## 2018-08-08 PROCEDURE — 25000125 ZZHC RX 250: Performed by: OBSTETRICS & GYNECOLOGY

## 2018-08-08 PROCEDURE — 25000125 ZZHC RX 250: Performed by: NURSE ANESTHETIST, CERTIFIED REGISTERED

## 2018-08-08 PROCEDURE — 37000008 ZZH ANESTHESIA TECHNICAL FEE, 1ST 30 MIN: Performed by: OBSTETRICS & GYNECOLOGY

## 2018-08-08 PROCEDURE — 36000056 ZZH SURGERY LEVEL 3 1ST 30 MIN: Performed by: OBSTETRICS & GYNECOLOGY

## 2018-08-08 PROCEDURE — 85025 COMPLETE CBC W/AUTO DIFF WBC: CPT | Performed by: OBSTETRICS & GYNECOLOGY

## 2018-08-08 PROCEDURE — 37000009 ZZH ANESTHESIA TECHNICAL FEE, EACH ADDTL 15 MIN: Performed by: OBSTETRICS & GYNECOLOGY

## 2018-08-08 PROCEDURE — 86901 BLOOD TYPING SEROLOGIC RH(D): CPT | Performed by: OBSTETRICS & GYNECOLOGY

## 2018-08-08 PROCEDURE — 25000128 H RX IP 250 OP 636: Performed by: OBSTETRICS & GYNECOLOGY

## 2018-08-08 PROCEDURE — 86900 BLOOD TYPING SEROLOGIC ABO: CPT | Performed by: OBSTETRICS & GYNECOLOGY

## 2018-08-08 PROCEDURE — 36000058 ZZH SURGERY LEVEL 3 EA 15 ADDTL MIN: Performed by: OBSTETRICS & GYNECOLOGY

## 2018-08-08 PROCEDURE — 88305 TISSUE EXAM BY PATHOLOGIST: CPT | Performed by: OBSTETRICS & GYNECOLOGY

## 2018-08-08 PROCEDURE — 86850 RBC ANTIBODY SCREEN: CPT | Performed by: OBSTETRICS & GYNECOLOGY

## 2018-08-08 PROCEDURE — 81025 URINE PREGNANCY TEST: CPT | Performed by: OBSTETRICS & GYNECOLOGY

## 2018-08-08 RX ORDER — LIDOCAINE 40 MG/G
CREAM TOPICAL
Status: DISCONTINUED | OUTPATIENT
Start: 2018-08-08 | End: 2018-08-09 | Stop reason: HOSPADM

## 2018-08-08 RX ORDER — FENTANYL CITRATE 50 UG/ML
25-50 INJECTION, SOLUTION INTRAMUSCULAR; INTRAVENOUS
Status: DISCONTINUED | OUTPATIENT
Start: 2018-08-08 | End: 2018-08-08

## 2018-08-08 RX ORDER — IBUPROFEN 600 MG/1
600 TABLET, FILM COATED ORAL EVERY 6 HOURS PRN
Status: DISCONTINUED | OUTPATIENT
Start: 2018-08-08 | End: 2018-08-09 | Stop reason: HOSPADM

## 2018-08-08 RX ORDER — CEFAZOLIN SODIUM 1 G/50ML
1 INJECTION, SOLUTION INTRAVENOUS SEE ADMIN INSTRUCTIONS
Status: DISCONTINUED | OUTPATIENT
Start: 2018-08-08 | End: 2018-08-08 | Stop reason: HOSPADM

## 2018-08-08 RX ORDER — SODIUM CHLORIDE, SODIUM LACTATE, POTASSIUM CHLORIDE, CALCIUM CHLORIDE 600; 310; 30; 20 MG/100ML; MG/100ML; MG/100ML; MG/100ML
INJECTION, SOLUTION INTRAVENOUS CONTINUOUS
Status: DISCONTINUED | OUTPATIENT
Start: 2018-08-08 | End: 2018-08-09 | Stop reason: HOSPADM

## 2018-08-08 RX ORDER — ONDANSETRON 4 MG/1
4 TABLET, ORALLY DISINTEGRATING ORAL EVERY 30 MIN PRN
Status: DISCONTINUED | OUTPATIENT
Start: 2018-08-08 | End: 2018-08-08

## 2018-08-08 RX ORDER — LIDOCAINE HYDROCHLORIDE 20 MG/ML
INJECTION, SOLUTION INFILTRATION; PERINEURAL PRN
Status: DISCONTINUED | OUTPATIENT
Start: 2018-08-08 | End: 2018-08-08

## 2018-08-08 RX ORDER — KETOROLAC TROMETHAMINE 30 MG/ML
30 INJECTION, SOLUTION INTRAMUSCULAR; INTRAVENOUS EVERY 6 HOURS
Status: COMPLETED | OUTPATIENT
Start: 2018-08-08 | End: 2018-08-09

## 2018-08-08 RX ORDER — SODIUM CHLORIDE, SODIUM LACTATE, POTASSIUM CHLORIDE, CALCIUM CHLORIDE 600; 310; 30; 20 MG/100ML; MG/100ML; MG/100ML; MG/100ML
INJECTION, SOLUTION INTRAVENOUS CONTINUOUS
Status: DISCONTINUED | OUTPATIENT
Start: 2018-08-08 | End: 2018-08-08

## 2018-08-08 RX ORDER — OXYCODONE AND ACETAMINOPHEN 5; 325 MG/1; MG/1
1-2 TABLET ORAL EVERY 4 HOURS PRN
Status: DISCONTINUED | OUTPATIENT
Start: 2018-08-08 | End: 2018-08-09 | Stop reason: HOSPADM

## 2018-08-08 RX ORDER — NALOXONE HYDROCHLORIDE 0.4 MG/ML
.1-.4 INJECTION, SOLUTION INTRAMUSCULAR; INTRAVENOUS; SUBCUTANEOUS
Status: DISCONTINUED | OUTPATIENT
Start: 2018-08-08 | End: 2018-08-09 | Stop reason: HOSPADM

## 2018-08-08 RX ORDER — SODIUM CHLORIDE, SODIUM LACTATE, POTASSIUM CHLORIDE, CALCIUM CHLORIDE 600; 310; 30; 20 MG/100ML; MG/100ML; MG/100ML; MG/100ML
INJECTION, SOLUTION INTRAVENOUS CONTINUOUS
Status: DISCONTINUED | OUTPATIENT
Start: 2018-08-08 | End: 2018-08-08 | Stop reason: HOSPADM

## 2018-08-08 RX ORDER — ONDANSETRON 2 MG/ML
4 INJECTION INTRAMUSCULAR; INTRAVENOUS EVERY 30 MIN PRN
Status: DISCONTINUED | OUTPATIENT
Start: 2018-08-08 | End: 2018-08-08

## 2018-08-08 RX ORDER — KETOROLAC TROMETHAMINE 30 MG/ML
INJECTION, SOLUTION INTRAMUSCULAR; INTRAVENOUS PRN
Status: DISCONTINUED | OUTPATIENT
Start: 2018-08-08 | End: 2018-08-08

## 2018-08-08 RX ORDER — NALOXONE HYDROCHLORIDE 0.4 MG/ML
.1-.4 INJECTION, SOLUTION INTRAMUSCULAR; INTRAVENOUS; SUBCUTANEOUS
Status: DISCONTINUED | OUTPATIENT
Start: 2018-08-08 | End: 2018-08-09

## 2018-08-08 RX ORDER — NEOSTIGMINE METHYLSULFATE 1 MG/ML
VIAL (ML) INJECTION PRN
Status: DISCONTINUED | OUTPATIENT
Start: 2018-08-08 | End: 2018-08-08

## 2018-08-08 RX ORDER — GLYCOPYRROLATE 0.2 MG/ML
INJECTION, SOLUTION INTRAMUSCULAR; INTRAVENOUS PRN
Status: DISCONTINUED | OUTPATIENT
Start: 2018-08-08 | End: 2018-08-08

## 2018-08-08 RX ORDER — CALCIUM CARBONATE 500 MG/1
1000 TABLET, CHEWABLE ORAL 4 TIMES DAILY PRN
Status: DISCONTINUED | OUTPATIENT
Start: 2018-08-08 | End: 2018-08-09 | Stop reason: HOSPADM

## 2018-08-08 RX ORDER — MEPERIDINE HYDROCHLORIDE 50 MG/ML
12.5 INJECTION INTRAMUSCULAR; INTRAVENOUS; SUBCUTANEOUS
Status: DISCONTINUED | OUTPATIENT
Start: 2018-08-08 | End: 2018-08-08

## 2018-08-08 RX ORDER — ONDANSETRON 2 MG/ML
INJECTION INTRAMUSCULAR; INTRAVENOUS PRN
Status: DISCONTINUED | OUTPATIENT
Start: 2018-08-08 | End: 2018-08-08

## 2018-08-08 RX ORDER — ONDANSETRON 2 MG/ML
4 INJECTION INTRAMUSCULAR; INTRAVENOUS EVERY 6 HOURS PRN
Status: DISCONTINUED | OUTPATIENT
Start: 2018-08-08 | End: 2018-08-09 | Stop reason: HOSPADM

## 2018-08-08 RX ORDER — METOCLOPRAMIDE HYDROCHLORIDE 5 MG/ML
10 INJECTION INTRAMUSCULAR; INTRAVENOUS EVERY 6 HOURS PRN
Status: DISCONTINUED | OUTPATIENT
Start: 2018-08-08 | End: 2018-08-09 | Stop reason: HOSPADM

## 2018-08-08 RX ORDER — FENTANYL CITRATE 50 UG/ML
INJECTION, SOLUTION INTRAMUSCULAR; INTRAVENOUS PRN
Status: DISCONTINUED | OUTPATIENT
Start: 2018-08-08 | End: 2018-08-08

## 2018-08-08 RX ORDER — OXYCODONE AND ACETAMINOPHEN 5; 325 MG/1; MG/1
1-2 TABLET ORAL EVERY 4 HOURS PRN
Qty: 20 TABLET | Refills: 0 | Status: SHIPPED | OUTPATIENT
Start: 2018-08-08 | End: 2018-08-09

## 2018-08-08 RX ORDER — LIDOCAINE HYDROCHLORIDE 40 MG/ML
SOLUTION TOPICAL PRN
Status: DISCONTINUED | OUTPATIENT
Start: 2018-08-08 | End: 2018-08-08

## 2018-08-08 RX ORDER — KETAMINE HYDROCHLORIDE 50 MG/ML
INJECTION, SOLUTION INTRAMUSCULAR; INTRAVENOUS PRN
Status: DISCONTINUED | OUTPATIENT
Start: 2018-08-08 | End: 2018-08-08

## 2018-08-08 RX ORDER — IBUPROFEN 600 MG/1
600 TABLET, FILM COATED ORAL EVERY 6 HOURS PRN
Qty: 30 TABLET | Refills: 0 | Status: SHIPPED | OUTPATIENT
Start: 2018-08-08 | End: 2018-08-27

## 2018-08-08 RX ORDER — CEFAZOLIN SODIUM 2 G/100ML
2 INJECTION, SOLUTION INTRAVENOUS
Status: COMPLETED | OUTPATIENT
Start: 2018-08-08 | End: 2018-08-08

## 2018-08-08 RX ORDER — DEXAMETHASONE SODIUM PHOSPHATE 4 MG/ML
INJECTION, SOLUTION INTRA-ARTICULAR; INTRALESIONAL; INTRAMUSCULAR; INTRAVENOUS; SOFT TISSUE PRN
Status: DISCONTINUED | OUTPATIENT
Start: 2018-08-08 | End: 2018-08-08

## 2018-08-08 RX ORDER — ACETAMINOPHEN 10 MG/ML
INJECTION, SOLUTION INTRAVENOUS PRN
Status: DISCONTINUED | OUTPATIENT
Start: 2018-08-08 | End: 2018-08-08

## 2018-08-08 RX ORDER — METOCLOPRAMIDE 10 MG/1
10 TABLET ORAL EVERY 6 HOURS PRN
Status: DISCONTINUED | OUTPATIENT
Start: 2018-08-08 | End: 2018-08-09 | Stop reason: HOSPADM

## 2018-08-08 RX ORDER — BUPIVACAINE HYDROCHLORIDE AND EPINEPHRINE 5; 5 MG/ML; UG/ML
INJECTION, SOLUTION PERINEURAL PRN
Status: DISCONTINUED | OUTPATIENT
Start: 2018-08-08 | End: 2018-08-08 | Stop reason: HOSPADM

## 2018-08-08 RX ORDER — LIDOCAINE 40 MG/G
CREAM TOPICAL
Status: DISCONTINUED | OUTPATIENT
Start: 2018-08-08 | End: 2018-08-08 | Stop reason: HOSPADM

## 2018-08-08 RX ORDER — ONDANSETRON 4 MG/1
4 TABLET, ORALLY DISINTEGRATING ORAL EVERY 6 HOURS PRN
Status: DISCONTINUED | OUTPATIENT
Start: 2018-08-08 | End: 2018-08-09 | Stop reason: HOSPADM

## 2018-08-08 RX ORDER — HYDROXYZINE PAMOATE 25 MG/1
25 CAPSULE ORAL EVERY 6 HOURS PRN
Status: DISCONTINUED | OUTPATIENT
Start: 2018-08-08 | End: 2018-08-09 | Stop reason: HOSPADM

## 2018-08-08 RX ORDER — AMOXICILLIN 250 MG
1-2 CAPSULE ORAL 2 TIMES DAILY
Qty: 30 TABLET | Refills: 0 | Status: SHIPPED | OUTPATIENT
Start: 2018-08-08 | End: 2018-08-27

## 2018-08-08 RX ORDER — PROPOFOL 10 MG/ML
INJECTION, EMULSION INTRAVENOUS PRN
Status: DISCONTINUED | OUTPATIENT
Start: 2018-08-08 | End: 2018-08-08

## 2018-08-08 RX ORDER — PROPOFOL 10 MG/ML
INJECTION, EMULSION INTRAVENOUS CONTINUOUS PRN
Status: DISCONTINUED | OUTPATIENT
Start: 2018-08-08 | End: 2018-08-08

## 2018-08-08 RX ORDER — HYDROMORPHONE HYDROCHLORIDE 1 MG/ML
0.2 INJECTION, SOLUTION INTRAMUSCULAR; INTRAVENOUS; SUBCUTANEOUS
Status: DISCONTINUED | OUTPATIENT
Start: 2018-08-08 | End: 2018-08-09 | Stop reason: HOSPADM

## 2018-08-08 RX ADMIN — BUPIVACAINE HYDROCHLORIDE AND EPINEPHRINE BITARTRATE 7 ML: 5; .0091 INJECTION, SOLUTION PERINEURAL at 08:15

## 2018-08-08 RX ADMIN — KETOROLAC TROMETHAMINE 30 MG: 30 INJECTION, SOLUTION INTRAMUSCULAR at 08:40

## 2018-08-08 RX ADMIN — NEOSTIGMINE METHYLSULFATE 3 MG: 1 INJECTION INTRAVENOUS at 08:38

## 2018-08-08 RX ADMIN — KETAMINE HYDROCHLORIDE 25 MG: 50 INJECTION, SOLUTION INTRAMUSCULAR; INTRAVENOUS at 07:36

## 2018-08-08 RX ADMIN — LIDOCAINE HYDROCHLORIDE 4 ML: 40 SOLUTION TOPICAL at 07:44

## 2018-08-08 RX ADMIN — ROCURONIUM BROMIDE 30 MG: 10 INJECTION INTRAVENOUS at 07:41

## 2018-08-08 RX ADMIN — PROPOFOL 100 MCG/KG/MIN: 10 INJECTION, EMULSION INTRAVENOUS at 07:41

## 2018-08-08 RX ADMIN — ONDANSETRON 4 MG: 2 INJECTION INTRAMUSCULAR; INTRAVENOUS at 07:40

## 2018-08-08 RX ADMIN — SODIUM CHLORIDE, SODIUM LACTATE, POTASSIUM CHLORIDE, AND CALCIUM CHLORIDE: 600; 310; 30; 20 INJECTION, SOLUTION INTRAVENOUS at 07:25

## 2018-08-08 RX ADMIN — SODIUM CHLORIDE, SODIUM LACTATE, POTASSIUM CHLORIDE, AND CALCIUM CHLORIDE: 600; 310; 30; 20 INJECTION, SOLUTION INTRAVENOUS at 14:31

## 2018-08-08 RX ADMIN — GLYCOPYRROLATE 0.6 MG: 0.2 INJECTION, SOLUTION INTRAMUSCULAR; INTRAVENOUS at 08:37

## 2018-08-08 RX ADMIN — OXYCODONE HYDROCHLORIDE AND ACETAMINOPHEN 1 TABLET: 5; 325 TABLET ORAL at 12:07

## 2018-08-08 RX ADMIN — KETOROLAC TROMETHAMINE 30 MG: 30 INJECTION, SOLUTION INTRAMUSCULAR at 21:08

## 2018-08-08 RX ADMIN — ACETAMINOPHEN 1000 MG: 10 INJECTION, SOLUTION INTRAVENOUS at 07:58

## 2018-08-08 RX ADMIN — MIDAZOLAM 2 MG: 1 INJECTION INTRAMUSCULAR; INTRAVENOUS at 07:36

## 2018-08-08 RX ADMIN — DEXAMETHASONE SODIUM PHOSPHATE 4 MG: 4 INJECTION, SOLUTION INTRA-ARTICULAR; INTRALESIONAL; INTRAMUSCULAR; INTRAVENOUS; SOFT TISSUE at 07:49

## 2018-08-08 RX ADMIN — FENTANYL CITRATE 50 MCG: 50 INJECTION, SOLUTION INTRAMUSCULAR; INTRAVENOUS at 07:36

## 2018-08-08 RX ADMIN — PROPOFOL 200 MG: 10 INJECTION, EMULSION INTRAVENOUS at 07:40

## 2018-08-08 RX ADMIN — CEFAZOLIN SODIUM 2 G: 2 INJECTION, SOLUTION INTRAVENOUS at 07:37

## 2018-08-08 RX ADMIN — OXYCODONE HYDROCHLORIDE AND ACETAMINOPHEN 2 TABLET: 5; 325 TABLET ORAL at 17:06

## 2018-08-08 RX ADMIN — RANITIDINE 150 MG: 150 TABLET ORAL at 10:26

## 2018-08-08 RX ADMIN — KETOROLAC TROMETHAMINE 30 MG: 30 INJECTION, SOLUTION INTRAMUSCULAR at 14:31

## 2018-08-08 RX ADMIN — LIDOCAINE HYDROCHLORIDE 100 MG: 20 INJECTION, SOLUTION INFILTRATION; PERINEURAL at 07:40

## 2018-08-08 RX ADMIN — LIDOCAINE HYDROCHLORIDE 0.1 ML: 10 INJECTION, SOLUTION EPIDURAL; INFILTRATION; INTRACAUDAL; PERINEURAL at 07:25

## 2018-08-08 RX ADMIN — FENTANYL CITRATE 50 MCG: 50 INJECTION, SOLUTION INTRAMUSCULAR; INTRAVENOUS at 07:49

## 2018-08-08 RX ADMIN — SODIUM CHLORIDE, SODIUM LACTATE, POTASSIUM CHLORIDE, AND CALCIUM CHLORIDE: 600; 310; 30; 20 INJECTION, SOLUTION INTRAVENOUS at 08:39

## 2018-08-08 ASSESSMENT — LIFESTYLE VARIABLES: TOBACCO_USE: 1

## 2018-08-08 NOTE — PLAN OF CARE
Problem: Pain, Acute (Adult)  Goal: Identify Related Risk Factors and Signs and Symptoms  Related risk factors and signs and symptoms are identified upon initiation of Human Response Clinical Practice Guideline (CPG).   Outcome: Therapy, progress toward functional goals as expected  Verbalizes pain    Comments: Assessments completed as charted. B/P: 113/78, T: 98, P: Data Unavailable, R: 16. Rates pain: 0/10. Urinary catheter in place urine clear, susannah in color with an output of 2600ml. Incisional puncture sites covered with steri-strips and are intact. Vaginal bleeding has been  scant. Activity: normal activity and ambulated in pena x 1.

## 2018-08-08 NOTE — PROGRESS NOTES
Patient admitted as outpatient from PACU at this time. Patient settled and oriented to [facility], [facility]. Char Echevarria RN on 8/8/2018 at 9:51 AM

## 2018-08-08 NOTE — ANESTHESIA CARE TRANSFER NOTE
Patient: Yanna Ordonez    Procedure(s):  Total Vaginal Hysterectomy and Bilateral Salpingectomy, Left oophorectomy - Wound Class: II-Clean Contaminated    Diagnosis: heavy and  irregular periods, cysts of both ovaries  Diagnosis Additional Information: No value filed.    Anesthesia Type:   General, ETT     Note:  Airway :Face Mask  Patient transferred to:PACU  Handoff Report: Identifed the Patient, Identified the Reponsible Provider, Reviewed the pertinent medical history, Discussed the surgical course, Reviewed Intra-OP anesthesia mangement and issues during anesthesia, Set expectations for post-procedure period and Allowed opportunity for questions and acknowledgement of understanding      Vitals: (Last set prior to Anesthesia Care Transfer)    CRNA VITALS  8/8/2018 0820 - 8/8/2018 0853      8/8/2018             Resp Rate (set): 10                Electronically Signed By: ANGEL CARROLL CRNA  August 8, 2018  8:53 AM

## 2018-08-08 NOTE — H&P (VIEW-ONLY)
"----------------- PREOPERATIVE EXAM ------------------  Nursing Notes:   Nahed Hernandez LPN  7/31/2018  4:22 PM  Signed  Date of Surgery: 8/8/2018  Type of Surgery: Hysterectomy  Surgeon: [facility]:  [place]  Fax:     Fever/Chills or otherinfectious symptoms in past month: No  >10lb weight loss in past two months: No    Health Care Directive/Code status:  Full Code  Hx of bloodtransfusions:   No   Td up to date:  Yes  History of VRE/MRSA:  No Date:     Preoperative Evaluation: Obstructive Sleep Apnea screening    S:Snore -  Do you snore loudly? (louder than talking or loud enough to be heard through closed doors)No  T: Tired - Do you often feeltired, fatigued, or sleepy during the daytime?No  O: Observed - Has anyone ever observed you stop breathing during your sleep?No  P: Pressure - Do you have or areyou being treated for high blood pressure?No  B: BMI - BMI greater than 35kg/m2?No  A: Age - Age over 50 years old?No  N: Neck - Neck circumferencegreater than 40 cm?No  G:Gender - Gender: Male?No    Totalnumber of \"YES\" responses:  0    Scoring: Low risk of DARRON 0-2  At Risk of DARRON: >3 High Risk ofOSA: 5-8    Nahed Hernandez LPN....................  7/31/2018   4:19 PM        7/31/2018    SUBJECTIVE:  Yanna Ordonez is a 43 year old female here for preoperative optimization.    I was asked to see Yanna Ordonez by Dr. Verdugo for  preoperative evaluation due to tobacco use and GERD.  She has been smoking 1/4-1/2ppd just recently (10-15pk yr hx).   Patient reports that she feels well but continues to have excessive menstruation.  No breakthrough acid reflux.  She denies any chest pain or shortness of breath.  No anginal equivalents.  They recently moved up to [facility] and she remains very active outdoors.  Has been kayaking and fishing and enjoying the wilderness without any cardiopulmonary symptoms.  She has not had fevers or chills.  No other symptoms concerning for infection    Patient Active " Problem List   Diagnosis     Carpal tunnel syndrome, bilateral     Hiatal hernia     Neck pain, chronic     Excessive or frequent menstruation     Lightheadedness       Past Medical History:   Diagnosis Date     Personal history of other medical treatment (CODE)     Childbirth       Past Surgical History:   Procedure Laterality Date     DILATION AND CURETTAGE      1992,after SAB       Current Outpatient Prescriptions   Medication Sig Dispense Refill     famotidine (PEPCID) 20 MG tablet Take 1 tablet (20 mg) by mouth 2 times daily 180 tablet 3     pantoprazole (PROTONIX) 40 MG EC tablet Take 1 tablet by mouth once daily. 90 tablet 3         Allergies:   No Known Allergies    Latex allergy  No  Immunizations:  Immunization History   Administered Date(s) Administered     DTAP (<7y) 1973, 1973, 11/02/1978, 07/11/1979     HepB-Adult 03/14/2016, 04/11/2016, 07/11/2016     Influenza (High Dose) 3 valent vaccine 10/09/2013     Influenza (IIV3) PF 10/06/2014     Influenza Vaccine IM 3yrs+ 4 Valent IIV4 09/01/2015, 11/06/2017     MMR 08/25/1978, 08/04/2016     Polio, Unspecified  1973, 11/02/1978, 07/11/1979     TD (ADULT, 7+) 09/27/2005     TDAP Vaccine (Boostrix) 08/31/2016         Family History   Problem Relation Age of Onset     Other - See Comments Mother 65     Celiac disease     Other - See Comments Father      Glaucoma     Family History Negative Sister      Good Health     Family History Negative Sister      Good Health     Family History Negative Brother      Good Health     Family History Negative Brother      Good Health,Twin     Family History Negative Brother      Good Health,Twin - Had GIB     HEART DISEASE No family hx of      Heart Disease,No known premature CAD     Diabetes No family hx of      Diabetes,DM2     Breast Cancer No family hx of      Cancer-breast     Colon Cancer No family hx of      Cancer-colon         Deniesfamily hx of bleeding tendencies, anesthesia complications, or  "other problems with surgery.    Social History     Social History     Marital status:      Spouse name: Jitendra     Number of children: N/A     Years of education: N/A     Occupational History     Not on file.     Social History Main Topics     Smoking status: Current Every Day Smoker     Packs/day: 0.50     Types: Cigarettes     Last attempt to quit: 10/1/2015     Smokeless tobacco: Never Used      Comment: Quit smoking: Had quit for 6 years and started again about a month ago     Alcohol use No     Drug use: No      Comment: Drug use: No     Sexual activity: Yes     Partners: Male     Birth control/ protection: None      Comment: Birth control method: Spouse vasectomy     Other Topics Concern     Not on file     Social History Narrative    Lives with  and three children ,   [place], 1993 -  at [employer] [employer], 1996 -  at [employer], 1998 - Graduated.   Works at [employer].         ROS:    Surgical:  patient denies previous complications from prior surgeries including but not limitedto prolonged bleeding, anesthesia complications, dysrhythmias, surgical wound infections, or prolonged hospital stay.  Cardiorespiratory: denies chest pain, palpitations, shortness of breath, cough. Most exertion in thepast 2 weeks was kayaking for an hour.   Complete ROS otherwise negative except as noted in HPI.     -------------------------------------------------------------    PHYSICAL EXAM:  /82  Pulse 76  Ht 5' 5\" (1.651 m)  Wt 201 lb (91.2 kg)  BMI 33.45 kg/m2      EXAM:  General Appearance:Pleasant, alert, appropriate appearance for age. No acute distress  Head Exam: Normal. Normocephalic, atraumatic.  Eyes: PERRL, EOMI  Ears: Normal TM's bilaterally.   OroPharynx: Mucosa pink and moist. Dentition in good repair. Yes  Neck: Supple, no masses or nodes, no lymphadenopathy.  No thyromegaly.  Lungs: Normal chest wall and respirations. Clear to auscultation, no wheezes " or crackles.  Cardiovascular: Regular rate andrhythm. S1, S2, no murmurs.  Gastrointestinal: Soft, nontender, no abnormal masses or organomegaly. BS normal   Musculoskeletal: No edema. No warm or erythematous joints.  Skin: no concerning or new rashes.  Neurologic Exam: CN 2-12 grossly intact.  Normal gait.  Symmetric DTRs, normal gross motor movement, tone, and coordination. No tremor.  Psychiatric Exam: Alert and oriented, appropriate affect.      EKG:  NSR.     ---------------------------------------------------------------    ASSESSEMENT AND PLAN:     (N92.0) Excessive or frequent menstruation  (primary encounter diagnosis)  Comment: Persistent  Plan: CBC W PLT No Diff        Hgb remains normal.      (K44.9) Hiatal hernia  Comment: symptoms controlled with PPI  Plan: continue protonix    (K21.0) Gastroesophageal reflux disease with esophagitis  Comment: symptoms controlled with PPI  Plan: pantoprazole (PROTONIX) 40 MG EC tablet,         famotidine (PEPCID) 20 MG tablet        Continue protonix    (Z01.810) Pre-operative cardiovascular examination  Comment: No perioperative issues identified  Plan: Basic Metabolic Panel, EKG 12-LEAD CLINIC READ                PRE OP RECOMMENDATIONS:    No family history of problems with bleeding or anesthesia. Patient is able to tolerate greater than 4 METs of activity without any cardiopulmonary symptoms. ASA PS class 2 . Nocardiopulmonary workup is neccessary for the current procedure. Please contact the office with any questions or concerns.    Patient is onchronic pain medications:No  Patient is on antiplatlet/anticoagulation:No  Other medications that need adjustment perioperatively:No    Other:  Patient was advised tocall our office and the surgical services with any change in condition or new symptoms if they were to develop between today and their surgical date; especially any cardiopulmonary symptoms or symptoms concerning for aninfection.

## 2018-08-08 NOTE — OP NOTE
[facility] Gynecology Operative Note    Pre-operative diagnosis: Menorrhagia  Ovarian cysts   Post-operative diagnosis: Same   Procedure: Vaginal hysterectomy  Bilateral salpingectomy and left oophorectomy   Surgeon: Demarco Verdugo MD   Assistant(s): None   Anesthesia: NILSA   Estimated blood loss: 50ml   Total IV fluids: (See anesthesia record)   Blood transfusion: No transfusion was given during surgery   Total urine output: (See anesthesia record)   Drains: Elise catheter   Specimens: Uterus, bilateral fallopian tubes, and left ovary   Findings: Serous cyst of left ovary, mildly enlarged uterus   Complications: None   Condition: Stable   Procedure details:  After consent was obtained the patientwas taken to the OR suite and placed under general anesthetic.  She was prepped and draped in sterile fashion in dorsal lithotomy position in candy cane stirrups.  After appropriate timeout procedure, a weighted speculum was placed in the posterior vaginal fornix.  Right angle retractors were placed in the anterior and lateral vaginal fornices.  The cervix was grasped with Henrontin clamps on the anterior and posterior lips and the cervix was injected with 0.5% marcaine with dilute epinephrine circumfrentially.  The vaginal mucosa was sharply incised circumscribing the cervix.  The anterior vesicopubic fascia was sharply dissected, and the peritoneum identified and divided.  Intraabdominal position was confirmed.  The cul de sac was entered sharply posterior to the cervix in like manner.  The vaginal mucosa was bluntly swept away from the underlying ligaments.  The uterosacral ligament on the left was clamped, cauterized with ligasure and divided. In two successive bites the cardinal ligament complex was clamped, cauterized and divided in like manner with the ureter palpably free of thedissection.  The same process was performed on the right taking down the uterosacral and cardinal ligaments in 3 bites.   The uterus was inverted and the right and left uteroovarian ligaments were clamped cauterized with ligasure and divided.  The uterus was removed and sent to Pathology. A 5 cm cyst was noted on the left ovary. It ruptured with manipulation and straw colored fluid drained from it. The left IP ligament was cross clamped, sealed and divided. The left tube and ovary were sent separately for Pathology. The right ovary appeared normal. The right tube was brought into the pelvis with Allis clamps and cross clamped and cauterized across the mesosalpinx with ligasure being careful to avoid the small bowel. The right tube was then individually excised. Hemostasis was excellent.    A pack was placed in the abdomen.  A George's culdeplasty stitch was placed plicating the posterior peritoneum and both uterosacral ligaments with the free ends being brought out through the vaginal mucosa. The pack was removed, all counts noted to be correct, and the culdeplasty stitch was tied.  The vaginal cuff was reapproximated with O Vicryl in an interrupted fashion.  Hemostasis was excellent. A Elise catheter was placed in the urethra with clear urine returning.    The patient was returned to the supine position and awakened from her anesthetic.  She was taken to the PACU in stable condition.  There were no complications. Pathology included uterus and fallopian tubes and left ovary.

## 2018-08-08 NOTE — IP AVS SNAPSHOT
MRN:[id number]                      After Visit Summary   8/8/2018    Yanna Ordonez    MRN: [id number]           Thank you!     Thank you for choosing [facility] for your care. Our goal is always to provide you with excellent care. Hearing back from our patients is one way we can continue to improve our services. Please take a few minutes to complete the written survey that you may receive in the mail after you visit with us. Thank you!        Patient Information     Date Of Birth          1975        Designated Caregiver       Most Recent Value    Caregiver    Will someone help with your care after discharge? yes    Name of designated caregiver dontrellelytoro Barlow dtr    Phone number of caregiver [phone]      About your hospital stay     You were admitted on:  August 8, 2018 You last received care in the:  [facility]    You were discharged on:  August 9, 2018        Reason for your hospital stay       Vaginal hysterectomy and left salpingo-oophorectomy                  Who to Call     For medical emergencies, please call 911.  For non-urgent questions about your medical care, please call your primary care provider or clinic, [phone]  For questions related to your surgery, please call your surgery clinic        Attending Provider     Provider Specialty    Demarco Verdugo MD OB/Gyn       Primary Care Provider Office Phone # Fax #    Bong Che [phone]      After Care Instructions     Activity       Your activity upon discharge: activity as tolerated, vaginal rest for 6 weeks. No lifting over 20 lbs for 6 weeks.            Diet       Follow this diet upon discharge: Regular            Diet Instructions       Resume pre-procedure diet            Discharge Instructions       Follow up appointment as instructed by Surgeon and or RN            Discharge Instructions           Dressing       Keep dressing clean and dry.  Dressing / incisional  care as instructed by RN and or Surgeon            No lifting       No lifting over 20 lbs and no strenuous physical activity for 6 weeks                  Follow-up Appointments     Follow-up and recommended labs and tests        Follow up with me,  Demarco Verdugo, within 14 days. to evaluate after surgery.  No follow up labs or test are needed.                  Your next 10 appointments already scheduled     Aug 27, 2018 10:15 AM CDT   [facility]    [address]  [place] [facility] [address]   [phone]            Sep 20, 2018  3:45 PM CDT   Office Visit with Demarco Verdugo MD   [facility]    [address]  [place] [facility] [address]   [phone]           Bring a current list of meds and any records pertaining to this visit. For Physicals, please bring immunization records and any forms needing to be filled out. Please arrive 10 minutes early to complete paperwork.              Pending Results     Date and Time Order Name Status Description    8/8/2018 0822 Surgical pathology exam In process             Statement of Approval     Ordered          08/09/18 0753  I have reviewed and agree with all the recommendations and orders detailed in this document.  EFFECTIVE NOW     Approved and electronically signed by:  Demarco Verdugo MD             Admission Information     Date & Time Provider Department Dept. Phone    8/8/2018 Demarco Verdugo MD [facility] [phone]      Your Vitals Were     Blood Pressure Pulse Temperature Respirations Weight       114/84 67 98.1  F (36.7  C) (Oral) 16 90 kg (198 lb 8 oz)     Pulse Oximetry BMI (Body Mass Index)                97% 33.03 kg/m2          [employer] Information     [employer] gives you secure access to your electronic health record. If you see a primary care provider, you can also send messages to  your care team and make appointments. If you have questions, please call your primary care clinic.  If you do not have a primary care provider, please call [phone] and they will assist you.        Care EveryWhere ID     This is your Care EveryWhere ID. This could be used by other organizations to access your [facility] medical records  [id number]        Equal Access to Services     HANNY LANGFORD : Hadrodrigo Crystal, walilianada ever, qaybta katarzyna bella, jerod livingston . So [facility] [phone].    ATENCIÓN: Si habla espmichelle, tiene a shelby disposición servicios gratuitos de asistencia lingüística. Karla al [phone].    We comply with applicable federal civil rights laws and [place] laws. We do not discriminate on the basis of race, color, national origin, age, disability, sex, sexual orientation, or gender identity.               Review of your medicines      START taking        Dose / Directions    ibuprofen 600 MG tablet   Commonly known as:  ADVIL/MOTRIN   Used for:  S/P hysterectomy with oophorectomy        Dose:  600 mg   Take 1 tablet (600 mg) by mouth every 6 hours as needed for pain (mild)   Quantity:  30 tablet   Refills:  0       oxyCODONE-acetaminophen 5-325 MG per tablet   Commonly known as:  PERCOCET   Used for:  S/P hysterectomy with oophorectomy        Dose:  1-2 tablet   Take 1-2 tablets by mouth every 4 hours as needed for pain (moderate to severe)   Quantity:  20 tablet   Refills:  0       senna-docusate 8.6-50 MG per tablet   Commonly known as:  SENOKOT-S;PERICOLACE   Used for:  S/P hysterectomy with oophorectomy        Dose:  1-2 tablet   Take 1-2 tablets by mouth 2 times daily Take while on oral narcotics to prevent or treat constipation.   Quantity:  30 tablet   Refills:  0         CONTINUE these medicines which have NOT CHANGED        Dose / Directions    famotidine 20 MG tablet   Commonly known as:  PEPCID   Used for:  Gastroesophageal reflux  disease with esophagitis        Dose:  20 mg   Take 1 tablet (20 mg) by mouth 2 times daily   Quantity:  180 tablet   Refills:  3       pantoprazole 40 MG EC tablet   Commonly known as:  PROTONIX   Used for:  Gastroesophageal reflux disease with esophagitis        Take 1 tablet by mouth once daily.   Quantity:  90 tablet   Refills:  3            Where to get your medicines      These medications were sent to [facility], - [place], [place] - [address]  [address], [place] [address]     Phone:  [phone]     ibuprofen 600 MG tablet    senna-docusate 8.6-50 MG per tablet         Some of these will need a paper prescription and others can be bought over the counter. Ask your nurse if you have questions.     Bring a paper prescription for each of these medications     oxyCODONE-acetaminophen 5-325 MG per tablet                Protect others around you: Learn how to safely use, store and throw away your medicines at www.disposemymeds.org.        Information about OPIOIDS     PRESCRIPTION OPIOIDS: WHAT YOU NEED TO KNOW   We gave you an opioid (narcotic) pain medicine. It is important to manage your pain, but opioids are not always the best choice. You should first try all the other options your care team gave you. Take this medicine for as short a time (and as few doses) as possible.    Some activities can increase your pain, such as bandage changes or therapy sessions. It may help to take your pain medicine 30 to 60 minutes before these activities. Reduce your stress by getting enough sleep, working on hobbies you enjoy and practicing relaxation or meditation. Talk to your care team about ways to manage your pain beyond prescription opioids.    These medicines have risks:    DO NOT drive when on new or higher doses of pain medicine. These medicines can affect your alertness and reaction times, and you could be arrested for driving under the influence (DUI). If you need to  use opioids long-term, talk to your care team about driving.    DO NOT operate heavy machinery    DO NOT do any other dangerous activities while taking these medicines.    DO NOT drink any alcohol while taking these medicines.     If the opioid prescribed includes acetaminophen, DO NOT take with any other medicines that contain acetaminophen. Read all labels carefully. Look for the word  acetaminophen  or  Tylenol.  Ask your pharmacist if you have questions or are unsure.    You can get addicted to pain medicines, especially if you have a history of addiction (chemical, alcohol or substance dependence). Talk to your care team about ways to reduce this risk.    All opioids tend to cause constipation. Drink plenty of water and eat foods that have a lot of fiber, such as fruits, vegetables, prune juice, apple juice and high-fiber cereal. Take a laxative (Miralax, milk of magnesia, Colace, Senna) if you don t move your bowels at least every other day. Other side effects include upset stomach, sleepiness, dizziness, throwing up, tolerance (needing more of the medicine to have the same effect), physical dependence and slowed breathing.    Store your pills in a secure place, locked if possible. We will not replace any lost or stolen medicine. If you don t finish your medicine, please throw away (dispose) as directed by your pharmacist. The [place] [employer]ution [employer] has more information about safe disposal: https://www.pca.[facility].[place].us/living-green/managing-unwanted-medications             Medication List: This is a list of all your medications and when to take them. Check marks below indicate your daily home schedule. Keep this list as a reference.      Medications           Morning Afternoon Evening Bedtime As Needed    famotidine 20 MG tablet   Commonly known as:  PEPCID   Take 1 tablet (20 mg) by mouth 2 times daily                                ibuprofen 600 MG tablet   Commonly known as:  ADVIL/MOTRIN   Take  1 tablet (600 mg) by mouth every 6 hours as needed for pain (mild)                                oxyCODONE-acetaminophen 5-325 MG per tablet   Commonly known as:  PERCOCET   Take 1-2 tablets by mouth every 4 hours as needed for pain (moderate to severe)   Last time this was given:  1 tablet on 8/9/2018 12:33 AM                                pantoprazole 40 MG EC tablet   Commonly known as:  PROTONIX   Take 1 tablet by mouth once daily.                                senna-docusate 8.6-50 MG per tablet   Commonly known as:  SENOKOT-S;PERICOLACE   Take 1-2 tablets by mouth 2 times daily Take while on oral narcotics to prevent or treat constipation.                                          More Information        Discharge Instructions for Vaginal Hysterectomy   Vaginal hysterectomy is surgery to remove the uterus and often the cervix. It takes 4 to 6 weeks to recover from the procedure. Here s what you need to know about caring for yourself during this time. Follow these and any other instructions you are given.  Two types of vaginal hysterectomy  Vaginal hysterectomy is done through an incision inside the vagina. In some cases, 2 to 3 small incisions are also made in the skin. Small tools are then put through the small incisions to assist the procedure. This is called laparoscopically assisted vaginal hysterectomy or LAVH. If a hysterectomy is done vaginally, the cervix is always removed as well.     Home care    Plan to rest at home for 3 to 5 days after the surgery.    Take all prescribed medicine exactly as directed.    Continue the coughing and deep breathing exercises you learned in the hospital.    If you had LAVH, you will have several small incisions on your belly. Keep the incisions clean and dry. Change bandages as instructed.    After LAVH, you may have pain in your shoulder. This is normal and due to gas used to expand your belly during the surgery. The pain may last up to 7 days.    If you have  stitches inside your vagina, they will absorb over time and do not need to be taken out.    Use sanitary pads to absorb vaginal bleeding or discharge. Light bleeding is likely at first. You may have a brownish discharge for up to 6 weeks.    Don't use tampons or douches. They can cause infection.    Avoid constipation, which causes straining to pass stool. Eat fruits, vegetables, and whole-grain foods. Drink at least 8 glasses of fluid each day. If needed, ask your health care provider whether you should use a stool softener.  Activity  Full recovery may take 2 to 4 weeks. This varies from woman to woman. Increase your activities a little bit each day. While you are recovering, be sure to:    Not drive while you are taking opioid or other narcotic pain medicines    Walk as often as you feel able. Walking prevents blood clots from forming. It also helps speed healing.    Climb stairs slowly. If you get tired, pause every few steps.    Not do sports or strenuous activity until your healthcare provider says it s OK    Not lift anything heavier than 10 pounds for 4 to 6 weeks    Ask others to help with chores and errands    Bathe or shower according to your healthcare provider s instructions    Not have sex until your healthcare provider says it s OK    Ask your healthcare provider when you can return to work  Follow-up care  You will visit the healthcare provider again to be sure you are healing well. Keep all follow-up appointments. Be sure to tell your healthcare provider if you have hot flashes, mood swings, or irritability. Medicine may help ease these symptoms.  Life after hysterectomy  This procedure removes your uterus. So you will no longer have menstrual periods. You won t be able to become pregnant. Also, you may not need Pap tests if your cervix was removed. Your healthcare provider can discuss these and other changes with you.  When to call your healthcare provider  Call your healthcare provider right away  if you have any of the following after your surgery:    Fever of 100.4 F (38 C) or higher    Vaginal bleeding that is bright red or soaks more than 1 pad in 60 minutes    Smelly or green-colored discharge from the vagina    Shortness of breath or chest pain    Nausea or vomiting that continues for more than 1 day or that make it impossible to eat or drink    Inability to move the bowels for 3 days    Loose or watery stools 2 or more times a day OR bloody stools    Trouble urinating or burning during urination    Severe pain or bloating in your belly (abdomen)    Pain or swelling in your legs    For LAVH, redness, swelling, drainage, or increasing pain at an incision site    You feel unusually depressed or sad after the surgery   Date Last Reviewed: 5/1/2017 2000-2017 [employer]. [address] [place] [address]. All rights reserved. This information is not intended as a substitute for professional medical care. Always follow your healthcare professional's instructions.              Home Care after Gynecologic Surgery  These instructions will help you to heal and recover more quickly. If you have questions, be sure to ask your nurse or doctor.   Wound care     If you have a bandage, you may remove it after 24 hours. Keep a dry, clean bandage on the wound if it is draining.    Once the wound has quit draining, leave it open to the air. If clothing rubs against it, you may cover it with a bandage during the day.    Keep the wound dry, and use no ointments unless told to use them.    You may shower 24 hours after surgery.    Do not soak in the tub for 4 weeks.  Activity     Try to cough, breathe deeply and use your breathing device (spirometer) every 15 to 30 minutes when awake. This will help prevent breathing problems and fevers.    When coughing or sneezing, you may want to hug a pillow for added support if you had surgery on your abdomen (belly). This will reduce pain.    For 6 to 8 weeks,  no heavy lifting (20 or more pounds), no heavy exercise, and no exercise that uses core muscles (yoga, Pilates, swimming, weight lifting).    You may walk as much as you wish. Try to increase your activity each day. Stairs are okay.    After hysterectomy, put nothing in your vagina for 8 weeks: No tampons, no intercourse, no douching.    You can expect light spotting and discharge for up to 6 weeks. If bleeding becomes heavy, please call the office.  Medicines     Take pain pills or medicines such as Tylenol, ibuprofen, or Naprosyn with food. This will reduce upset stomach.    If you have nausea and vomiting or a rash, stop the medicine and call your doctor.    Do not drive, make important decisions, or use heavy machinery while taking narcotic pain pills.  Diet     You may eat a normal diet unless told otherwise.    A high-fiber diet and plenty of fluids will help prevent constipation.  Constipation   This is common after surgery. Take the stool softeners (such as Senokot-S) for 6 weeks (unless you get diarrhea). Drink enough water. If you are constipated or unable to pass stool, please try these measures:    Milk of Magnesia: 30ml (2 tablespoons) twice a day    Metamucil: 2 tablespoons mixed with 12 ounces of fluid    Dulcolax oral or suppositories    Prunes or prune juice    Miralax every day  Call your surgeon if you have:    Temperature greater than 100.5 F (38 C)    Nausea and vomiting that don't stop    Severe pain, not controlled with medicine    Signs of infection: pain, swelling, redness, odor or green or yellow discharge from the wound    Trouble breathing, headache or problems with sight    Hives    Feel dizzy or light-headed    Extreme tiredness  In an emergency, call 911 or go to Emergency.  Questions:   Please call your doctor or the hospital if you have any questions. They will be glad to help you.  Clinic numbers  [facility] [place] [facility]: [phone], option  4  [facility]:   [phone]  [facility]: [phone]  [facility]:   [phone], option 2  [facility]: [phone]  For informational purposes only. Not to replace the advice of your health care provider.   Copyright   2017 [facility]. All rights reserved. [employer] 537229 - 02/17.

## 2018-08-08 NOTE — ANESTHESIA POSTPROCEDURE EVALUATION
Patient: Yanna Ordonez    Procedure(s):  Total Vaginal Hysterectomy and Bilateral Salpingectomy, Left oophorectomy - Wound Class: II-Clean Contaminated    Diagnosis:heavy and  irregular periods, cysts of both ovaries  Diagnosis Additional Information: No value filed.    Anesthesia Type:  General, ETT    Note:  Anesthesia Post Evaluation    Patient location during evaluation: Bedside and Floor  Patient participation: Able to fully participate in evaluation  Level of consciousness: awake and alert  Pain management: adequate  Airway patency: patent  Cardiovascular status: acceptable  Respiratory status: acceptable  Hydration status: acceptable  PONV: none     Anesthetic complications: None          Last vitals:  Vitals:    08/08/18 0910 08/08/18 0915 08/08/18 0930   BP: 121/67 114/74 115/74   Resp: 23 16 16   Temp: 97.6  F (36.4  C)  97.7  F (36.5  C)   SpO2: 97% 97%          Electronically Signed By: ANGEL CONNELLY CRNA  August 8, 2018  1:45 PM

## 2018-08-08 NOTE — ANESTHESIA PREPROCEDURE EVALUATION
Anesthesia Evaluation     . Pt has had prior anesthetic. Type: MAC    No history of anesthetic complications          ROS/MED HX    ENT/Pulmonary:     (+)tobacco use, Current use 1/2 packs/day  , . .    Neurologic:  - neg neurologic ROS     Cardiovascular:  - neg cardiovascular ROS       METS/Exercise Tolerance:  >4 METS   Hematologic:  - neg hematologic  ROS       Musculoskeletal:  - neg musculoskeletal ROS       GI/Hepatic:     (+) GERD Asymptomatic on medication, hiatal hernia,       Renal/Genitourinary:  - ROS Renal section negative       Endo:  - neg endo ROS       Psychiatric:  - neg psychiatric ROS       Infectious Disease:  - neg infectious disease ROS       Malignancy:      - no malignancy   Other:    (+) No chance of pregnancy C-spine cleared: N/A, no H/O Chronic Pain,no other significant disability   - neg other ROS                 Physical Exam  Normal systems: cardiovascular, pulmonary and dental    Airway   Mallampati: II  TM distance: >3 FB  Neck ROM: full    Dental     Cardiovascular   Rhythm and rate: regular and normal      Pulmonary    breath sounds clear to auscultation                    Anesthesia Plan      History & Physical Review      ASA Status:  2 .    NPO Status:  > 6 hours    Plan for General and ETT (Pt requests GA vs. Spinal) with Propofol induction. Maintenance will be Balanced.    PONV prophylaxis:  Ondansetron (or other 5HT-3) and Dexamethasone or Solumedrol       Postoperative Care      Consents  Anesthetic plan, risks, benefits and alternatives discussed with:  Patient.  Use of blood products discussed: No .   .                          .

## 2018-08-08 NOTE — OR NURSING
PACU Respiratory Event Documentation     1) Episodes of Apnea greater than or equal to 10 seconds: no    2) Bradypnea - less than 8 breaths per minute: no    3) Pain score on 0 to 10 scale: 3, mild cramping    4) Pain-sedation mismatch (yes or no): no    5) Repeated 02 desaturation less than 90% (yes or no): no    Anesthesia notified? (yes or no): no    Any of the above events occuring repeatedly in separate 30 minute intervals may be considered recurrent PACU respiratory events.    Magalis Villarreal RN

## 2018-08-08 NOTE — IP AVS SNAPSHOT
[facility]    [address]    [place] [address]    Phone:  [phone]    Fax:  [phone]                                       After Visit Summary   8/8/2018    Yanna Ordonez    MRN: [id number]           After Visit Summary Signature Page     I have received my discharge instructions, and my questions have been answered. I have discussed any challenges I see with this plan with the nurse or doctor.    ..........................................................................................................................................  Patient/Patient Representative Signature      ..........................................................................................................................................  Patient Representative Print Name and Relationship to Patient    ..................................................               ................................................  Date                                            Time    ..........................................................................................................................................  Reviewed by Signature/Title    ...................................................              ..............................................  Date                                                            Time

## 2018-08-09 VITALS
BODY MASS INDEX: 33.03 KG/M2 | RESPIRATION RATE: 16 BRPM | TEMPERATURE: 98.1 F | OXYGEN SATURATION: 97 % | DIASTOLIC BLOOD PRESSURE: 84 MMHG | SYSTOLIC BLOOD PRESSURE: 114 MMHG | HEART RATE: 67 BPM | WEIGHT: 198.5 LBS

## 2018-08-09 LAB
BASOPHILS # BLD AUTO: 0 10E9/L (ref 0–0.2)
BASOPHILS NFR BLD AUTO: 0.3 %
CREAT SERPL-MCNC: 0.72 MG/DL (ref 0.6–1.2)
DIFFERENTIAL METHOD BLD: ABNORMAL
EOSINOPHIL # BLD AUTO: 0.1 10E9/L (ref 0–0.7)
EOSINOPHIL NFR BLD AUTO: 1.6 %
ERYTHROCYTE [DISTWIDTH] IN BLOOD BY AUTOMATED COUNT: 13.2 % (ref 10–15)
GFR SERPL CREATININE-BSD FRML MDRD: 88 ML/MIN/1.7M2
HCT VFR BLD AUTO: 33 % (ref 35–47)
HGB BLD-MCNC: 10.9 G/DL (ref 11.7–15.7)
IMM GRANULOCYTES # BLD: 0 10E9/L (ref 0–0.4)
IMM GRANULOCYTES NFR BLD: 0.3 %
LYMPHOCYTES # BLD AUTO: 2.6 10E9/L (ref 0.8–5.3)
LYMPHOCYTES NFR BLD AUTO: 29.6 %
MCH RBC QN AUTO: 28.5 PG (ref 26.5–33)
MCHC RBC AUTO-ENTMCNC: 33 G/DL (ref 31.5–36.5)
MCV RBC AUTO: 86 FL (ref 78–100)
MONOCYTES # BLD AUTO: 0.6 10E9/L (ref 0–1.3)
MONOCYTES NFR BLD AUTO: 6.8 %
NEUTROPHILS # BLD AUTO: 5.5 10E9/L (ref 1.6–8.3)
NEUTROPHILS NFR BLD AUTO: 61.4 %
PLATELET # BLD AUTO: 229 10E9/L (ref 150–450)
RBC # BLD AUTO: 3.83 10E12/L (ref 3.8–5.2)
WBC # BLD AUTO: 8.9 10E9/L (ref 4–11)

## 2018-08-09 PROCEDURE — 85025 COMPLETE CBC W/AUTO DIFF WBC: CPT | Performed by: OBSTETRICS & GYNECOLOGY

## 2018-08-09 PROCEDURE — 82565 ASSAY OF CREATININE: CPT | Performed by: OBSTETRICS & GYNECOLOGY

## 2018-08-09 PROCEDURE — 58262 VAG HYST INCLUDING T/O: CPT | Performed by: OBSTETRICS & GYNECOLOGY

## 2018-08-09 PROCEDURE — 36415 COLL VENOUS BLD VENIPUNCTURE: CPT | Performed by: OBSTETRICS & GYNECOLOGY

## 2018-08-09 PROCEDURE — 25000128 H RX IP 250 OP 636: Performed by: OBSTETRICS & GYNECOLOGY

## 2018-08-09 PROCEDURE — 25000132 ZZH RX MED GY IP 250 OP 250 PS 637: Performed by: OBSTETRICS & GYNECOLOGY

## 2018-08-09 RX ORDER — OXYCODONE AND ACETAMINOPHEN 5; 325 MG/1; MG/1
1-2 TABLET ORAL EVERY 4 HOURS PRN
Qty: 20 TABLET | Refills: 0 | Status: SHIPPED | OUTPATIENT
Start: 2018-08-09 | End: 2018-08-27

## 2018-08-09 RX ADMIN — KETOROLAC TROMETHAMINE 30 MG: 30 INJECTION, SOLUTION INTRAMUSCULAR at 02:59

## 2018-08-09 RX ADMIN — OXYCODONE HYDROCHLORIDE AND ACETAMINOPHEN 1 TABLET: 5; 325 TABLET ORAL at 00:33

## 2018-08-09 RX ADMIN — KETOROLAC TROMETHAMINE 30 MG: 30 INJECTION, SOLUTION INTRAMUSCULAR at 09:06

## 2018-08-09 NOTE — PLAN OF CARE
Problem: Pain, Acute (Adult)  Goal: Acceptable Pain Control/Comfort Level  Patient will demonstrate the desired outcomes by discharge/transition of care.   Outcome: Therapy, progress toward functional goals as expected  Patient denies any pain. Declined any prn pain medications this am.      Problem: Hysterectomy (Adult)  Goal: Signs and Symptoms of Listed Potential Problems Will be Absent, Minimized or Managed (Hysterectomy)  Signs and symptoms of listed potential problems will be absent, minimized or managed by discharge/transition of care (reference Hysterectomy (Adult) CPG).   Outcome: Therapy, progress toward functional goals as expected  Elise removed.  Patient tolerated. Due to void.  Patient now granted independence in her room.

## 2018-08-09 NOTE — PLAN OF CARE
Problem: Hysterectomy (Adult)  Goal: Signs and Symptoms of Listed Potential Problems Will be Absent, Minimized or Managed (Hysterectomy)  Signs and symptoms of listed potential problems will be absent, minimized or managed by discharge/transition of care (reference Hysterectomy (Adult) CPG).  Outcome: Therapy, progress toward functional goals as expected  /56  Temp 98.1  F (36.7  C) (Oral)  Resp 16  Wt 90 kg (198 lb 8 oz)  LMP   SpO2 97%  Breastfeeding? No  BMI 33.03 kg/m2  Bleeding small.  BS active.  Patient unsure of whether she is passing gas or not.  Denies nausea. Tolerating food and fluids. Afebrile.  Elise in place.  Urinary output adequate. SCDs on.  No abdominal  incisions present.  Patient tolerating activity. Pain is under control.  Saline locked.  Will continue to monitor.

## 2018-08-09 NOTE — PROGRESS NOTES
[facility]    Gynecology  Daily Post-Op Note    Assessment & Plan   Procedure(s):  HYSTERECTOMY VAGINAL   -1 Day Post-Op    Doing well.  No immediate surgical complications identified.  No excessive bleeding  Pain well-controlled.    Plan:  -Ambulate  -Advance activity as tolerated  -Pain control measures  -Advance diet as tolerated  Active Problems:    S/P hysterectomy        Demarco Verdugo    Interval History   Doing well.  Continues to improve.  Pain is well-controlled.  No fevers.      Physical Exam   Temp: 98.1  F (36.7  C) Temp src: Oral BP: 114/84 Pulse: 67 Heart Rate: 91 Resp: 16 SpO2: 97 % O2 Device: None (Room air) Oxygen Delivery: 6 LPM  Vitals:    08/08/18 0704   Weight: 90 kg (198 lb 8 oz)     Vital Signs with Ranges  Temp:  [97.6  F (36.4  C)-98.1  F (36.7  C)] 98.1  F (36.7  C)  Pulse:  [67] 67  Heart Rate:  [65-92] 91  Resp:  [11-23] 16  BP: (104-121)/(56-88) 114/84  SpO2:  [97 %-100 %] 97 %  I/O last 3 completed shifts:  In: 1806 [I.V.:1806]  Out: 3600 [Urine:3600]    Extremities Non-tender  Abdomen soft, non-tender, normal bowel sounds    Medications     lactated ringers 100 mL/hr at 08/08/18 2139        ranitidine  150 mg Oral Q12H    Or     famotidine  20 mg Intravenous Q12H     ketorolac  30 mg Intravenous Q6H     sodium chloride (PF)  3 mL Intracatheter Q8H       Data     Recent Labs  Lab 08/09/18  0410 08/08/18  0710   WBC 8.9 5.2   HGB 10.9* 12.9   MCV 86 85    264   CR 0.72  --

## 2018-08-09 NOTE — PROGRESS NOTES
Pt up to BR to void 600 ml and is going to take a shower. Pt denies any pain at this time. Pt given IV toradol per order as scheduled. Pt IV then removed per order. Halie Castro RN on 8/9/2018 at 9:18 AM

## 2018-08-09 NOTE — PROGRESS NOTES
Pt discharged, pt ambulatory to vehicle with staff and daughter. Halie Castro RN on 8/9/2018 at 11:09 AM

## 2018-08-09 NOTE — PROGRESS NOTES
Pt given discharge instructions and follow up appointments. Pt given prescriptions and questions answered. Pt verbalized understanding. Halie Castro RN on 8/9/2018 at 11:03 AM

## 2018-08-09 NOTE — PLAN OF CARE
Problem: Pain, Acute (Adult)  Goal: Acceptable Pain Control/Comfort Level  Patient will demonstrate the desired outcomes by discharge/transition of care.   Outcome: Improving  Patient states she has minimal pain.  Small amount of bleeding on her peripad.  Able to ambulate in the pena this evening and do HS cares standing in the bathroom with minimal assist. VSS.  Tolerating food and fluids.

## 2018-08-09 NOTE — DISCHARGE SUMMARY
[facility]    Discharge Summary  Gynecology    Date of Admission:  8/8/2018  Date of Discharge:  8/9/2018  Discharging Provider: Demarco Verdugo    Discharge Diagnoses   Active Problems:    S/P hysterectomy      Procedure/Surgery Information   Procedure: Procedure(s):  Total Vaginal Hysterectomy and Bilateral Salpingectomy, Left oophorectomy - Wound Class: II-Clean Contaminated   Surgeon(s): Surgeon(s) and Role:     * Demarco Verdugo MD - Primary   Specimens:   ID Type Source Tests Collected by Time Destination   A : left fallopian tube and ovary, ruptured cyst Tissue Fallopian Tube and Ovary, Left SURGICAL PATHOLOGY EXAM Demarco Verdugo MD 8/8/2018  8:22 AM    B : uterus, cervix, right fallopian tube Tissue Uterus, Cervix, Right Fallopian Tube SURGICAL PATHOLOGY EXAM Demarco Verdugo MD 8/8/2018  8:23 AM       Non-operative procedures None performed     History of Present Illness   Yanna Ordonez is a 43 year old female who presented with menorrhagia for hysterectomy. She had an uncomplicated vaginal hysterectomy including tubes and left ovary. Her postop course was uncomplicated.    Hospital Course   The patient's hospital course was unremarkable.  She recovered as anticipated and experienced no post-operative complication.      Post-operative pain control: included Hydromorphone (dilaudid) IV and will be Percocet on discharge.     Medications discontinued or adjusted during this hospitalization: No change     Antibiotics prescribed at discharge: None prescribed     Imaging study follow up needs:   -None performed    Significant Findings: Doing well    Demarco Verdugo    Discharge Disposition   Discharged to home   Condition at discharge: Good    Pending Results   Final pathology results: Pending at time of discharge  These results will be followed up by Demarco Verdugo MD FACOG    Unresulted Labs Ordered in the Past 30 Days of this Admission     Date and Time Order Name Status Description     8/8/2018 0822 Surgical pathology exam In process           Primary Care Physician   Bong Che        Consultations This Hospital Stay   None    Time Spent on this Encounter   I have spent less than 30 minutes on this discharge.    Discharge Orders     No lifting   No lifting over 20 lbs and no strenuous physical activity for 6 weeks     Diet Instructions   Resume pre-procedure diet     Dressing   Keep dressing clean and dry.  Dressing / incisional care as instructed by RN and or Surgeon     Discharge Instructions   Follow up appointment as instructed by Surgeon and or RN     Reason for your hospital stay   Vaginal hysterectomy and left salpingo-oophorectomy     Follow-up and recommended labs and tests    Follow up with me,  Demarco Verdugo, within 14 days. to evaluate after surgery.  No follow up labs or test are needed.     Activity   Your activity upon discharge: activity as tolerated, vaginal rest for 6 weeks. No lifting over 20 lbs for 6 weeks.     Discharge Instructions     Full Code     Diet   Follow this diet upon discharge: Regular       Discharge Medications   Current Discharge Medication List      START taking these medications    Details   ibuprofen (ADVIL/MOTRIN) 600 MG tablet Take 1 tablet (600 mg) by mouth every 6 hours as needed for pain (mild)  Qty: 30 tablet, Refills: 0    Associated Diagnoses: S/P hysterectomy with oophorectomy      oxyCODONE-acetaminophen (PERCOCET) 5-325 MG per tablet Take 1-2 tablets by mouth every 4 hours as needed for pain (moderate to severe)  Qty: 20 tablet, Refills: 0    Associated Diagnoses: S/P hysterectomy with oophorectomy      senna-docusate (SENOKOT-S;PERICOLACE) 8.6-50 MG per tablet Take 1-2 tablets by mouth 2 times daily Take while on oral narcotics to prevent or treat constipation.  Qty: 30 tablet, Refills: 0    Comments: While on narcotics  Associated Diagnoses: S/P hysterectomy with oophorectomy         CONTINUE these medications which have NOT CHANGED     Details   pantoprazole (PROTONIX) 40 MG EC tablet Take 1 tablet by mouth once daily.  Qty: 90 tablet, Refills: 3    Associated Diagnoses: Gastroesophageal reflux disease with esophagitis      famotidine (PEPCID) 20 MG tablet Take 1 tablet (20 mg) by mouth 2 times daily  Qty: 180 tablet, Refills: 3    Associated Diagnoses: Gastroesophageal reflux disease with esophagitis           Allergies   No Known Allergies  Data   Most Recent 3 CBC's:  Recent Labs   Lab Test  08/09/18   0410  08/08/18   0710  07/31/18   1627   WBC  8.9  5.2  8.5   HGB  10.9*  12.9  12.7   MCV  86  85  85   PLT  229  264  300      Most Recent 3 BMP's:  Recent Labs   Lab Test  08/09/18   0410  07/31/18   1627  03/21/18   1104  08/31/16   1130   NA   --   136  138  137   POTASSIUM   --   3.8  4.4  4.4   CHLORIDE   --   101  103  104   CO2   --   25  28  26   BUN   --   17  14  14   CR  0.72  0.77  0.75  0.73   ANIONGAP   --   10  7   --    DEBBIE   --   9.6  9.6  9.4   GLC   --   96  102  104     Most Recent 2 LFT's:No lab results found.  Most Recent INR's and Anticoagulation Dosing History:  Anticoagulation Dose History     There is no flowsheet data to display.        Most Recent 3 Troponin's:No lab results found.  Most Recent Cholesterol Panel:  Recent Labs   Lab Test  08/31/16   1130   LDL  138*   HDL  48   TRIG  168*     Most Recent 6 Bacteria Isolates From Any Culture (See EPIC Reports for Culture Details):No lab results found.  Most Recent TSH, T4 and A1c Labs:No lab results found.

## 2018-08-20 ENCOUNTER — TELEPHONE (OUTPATIENT)
Dept: OBGYN | Facility: OTHER | Age: 43
End: 2018-08-20

## 2018-08-20 NOTE — TELEPHONE ENCOUNTER
This RN returned pt's telephone call.  Left message on machine to call back.  Lizzy King RN on 8/20/2018 at 1:29 PM

## 2018-08-21 ENCOUNTER — MYC MEDICAL ADVICE (OUTPATIENT)
Dept: OBGYN | Facility: OTHER | Age: 43
End: 2018-08-21

## 2018-08-27 ENCOUNTER — OFFICE VISIT (OUTPATIENT)
Dept: OBGYN | Facility: OTHER | Age: 43
End: 2018-08-27
Attending: OBSTETRICS & GYNECOLOGY
Payer: COMMERCIAL

## 2018-08-27 VITALS
TEMPERATURE: 98.7 F | SYSTOLIC BLOOD PRESSURE: 122 MMHG | DIASTOLIC BLOOD PRESSURE: 84 MMHG | BODY MASS INDEX: 33.7 KG/M2 | WEIGHT: 202.5 LBS

## 2018-08-27 DIAGNOSIS — Z90.710 S/P HYSTERECTOMY: Primary | ICD-10-CM

## 2018-08-27 PROCEDURE — 99024 POSTOP FOLLOW-UP VISIT: CPT | Performed by: OBSTETRICS & GYNECOLOGY

## 2018-08-27 ASSESSMENT — PAIN SCALES - GENERAL: PAINLEVEL: NO PAIN (0)

## 2018-08-27 NOTE — LETTER
8/27/2018       RE: Yanna Ordonez  [address] [address] [facility] [address]     Dear Colleague,    Thank you for referring your patient, Yanna Ordonez, to the [facility] at [facility]. Please see a copy of my visit note below.    Yanna Ordonez presents todayfor a postop checkup at 2 weeks after vaginal hysterectomy and LSO    S: Bowels and bladder are functioning normally, no abnormal bleeding or pain. No concerns about the incision(s).    Current Outpatient Prescriptions   Medication     famotidine (PEPCID) 20 MG tablet     pantoprazole (PROTONIX) 40 MG EC tablet     No current facility-administered medications for this visit.      No Known Allergies  Past Medical History:   Diagnosis Date     Personal history of other medical treatment (CODE)     Childbirth     Past Surgical History:   Procedure Laterality Date     DILATION AND CURETTAGE      1992,after SAB     HYSTERECTOMY VAGINAL N/A 8/8/2018    Procedure: HYSTERECTOMY VAGINAL;  Total Vaginal Hysterectomy and Bilateral Salpingectomy, Left oophorectomy;  Surgeon: Demarco Verdugo MD;  Location:  OR       COMPLETE REVIEW OF SYSTEMS: neg        O: /84 (BP Location: Right arm)  Temp 98.7  F (37.1  C)  Wt 91.9 kg (202 lb 8 oz)  LMP 06/02/2018  BMI 33.7 kg/m2 Body mass index is 33.7 kg/(m^2).    EXAM:  NAD  Abdomen soft and NT      I/P:  Stable postop  Recheck in one month  Path reviewed  Restrictions and work slip addressed.      Again, thank you for allowing me to participate in the care of your patient.      Sincerely,    Demarco Verdugo MD

## 2018-08-27 NOTE — PROGRESS NOTES
Yanna Ordonez presents todayfor a postop checkup at 2 weeks after vaginal hysterectomy and LSO    S: Bowels and bladder are functioning normally, no abnormal bleeding or pain. No concerns about the incision(s).    Current Outpatient Prescriptions   Medication     famotidine (PEPCID) 20 MG tablet     pantoprazole (PROTONIX) 40 MG EC tablet     No current facility-administered medications for this visit.      No Known Allergies  Past Medical History:   Diagnosis Date     Personal history of other medical treatment (CODE)     Childbirth     Past Surgical History:   Procedure Laterality Date     DILATION AND CURETTAGE      1992,after SAB     HYSTERECTOMY VAGINAL N/A 8/8/2018    Procedure: HYSTERECTOMY VAGINAL;  Total Vaginal Hysterectomy and Bilateral Salpingectomy, Left oophorectomy;  Surgeon: Demarco Verdugo MD;  Location:  OR       COMPLETE REVIEW OF SYSTEMS: neg        O: /84 (BP Location: Right arm)  Temp 98.7  F (37.1  C)  Wt 91.9 kg (202 lb 8 oz)  LMP 06/02/2018  BMI 33.7 kg/m2 Body mass index is 33.7 kg/(m^2).    EXAM:  NAD  Abdomen soft and NT      I/P:  Stable postop  Recheck in one month  Path reviewed  Restrictions and work slip addressed.    Demarco Verdugo MD FACOG  11:10 AM 8/27/2018

## 2018-08-27 NOTE — LETTER
[facility] [place] [facility]  [address]  [place] [address]          August 27, 2018    RE:  Yanna MITTAL Mery                                                                                                                                                       [id number] [address] [address]            To whom it may concern:    Yanna Ordonez is under my professional care for postop care. She may resume normal hours starting 9/4/2018 with no lifting over 20 lbs until 9/19/2018, then no restrictions.    Sincerely,      Demarco Verdugo MD FACOG  10:43 AM 8/27/2018

## 2018-08-27 NOTE — MR AVS SNAPSHOT
After Visit Summary   8/27/2018    Yanna Ordonez    MRN: [id number]           Patient Information     Date Of Birth          1975        Visit Information        Provider Department      8/27/2018 10:15 AM Demarco Verdugo MD [facility]        Today's Diagnoses     S/P hysterectomy    -  1       Follow-ups after your visit        Your next 10 appointments already scheduled     Sep 20, 2018  3:45 PM CDT   Office Visit with Demarco Verdugo MD   [facility]    [address]  [place] [address] [phone]           Bring a current list of meds and any records pertaining to this visit. For Physicals, please bring immunization records and any forms needing to be filled out. Please arrive 10 minutes early to complete paperwork.              Who to contact     If you have questions or need follow up information about today's clinic visit or your schedule please contact [facility] directly at [phone].  Normal or non-critical lab and imaging results will be communicated to you by Zentyalhart, letter or phone within 4 business days after the clinic has received the results. If you do not hear from us within 7 days, please contact the clinic through Viadeo or phone. If you have a critical or abnormal lab result, we will notify you by phone as soon as possible.  Submit refill requests through Viadeo or call your pharmacy and they will forward the refill request to us. Please allow 3 business days for your refill to be completed.          Additional Information About Your Visit        Zentyalhart Information     Viadeo gives you secure access to your electronic health record. If you see a primary care provider, you can also send messages to your care team and make appointments. If you have questions, please call your primary care clinic.  If you do not have a primary care provider, please  call [phone] and they will assist you.        Care EveryWhere ID     This is your Care EveryWhere ID. This could be used by other organizations to access your [facility] medical records  [id number]        Your Vitals Were     Temperature Last Period BMI (Body Mass Index)             98.7  F (37.1  C) 06/02/2018 33.7 kg/m2          Blood Pressure from Last 3 Encounters:   08/27/18 122/84   08/09/18 114/84   07/31/18 120/82    Weight from Last 3 Encounters:   08/27/18 91.9 kg (202 lb 8 oz)   08/08/18 90 kg (198 lb 8 oz)   07/31/18 91.2 kg (201 lb)              Today, you had the following     No orders found for display         Today's Medication Changes          These changes are accurate as of 8/27/18 11:12 AM.  If you have any questions, ask your nurse or doctor.               Stop taking these medicines if you haven't already. Please contact your care team if you have questions.     ibuprofen 600 MG tablet   Commonly known as:  ADVIL/MOTRIN   Stopped by:  Demarco Verdugo MD           oxyCODONE-acetaminophen 5-325 MG per tablet   Commonly known as:  PERCOCET   Stopped by:  Demarco Verdugo MD           senna-docusate 8.6-50 MG per tablet   Commonly known as:  SENOKOT-S;PERICOLACE   Stopped by:  Demarco Verdugo MD                    Primary Care Provider Office Phone # Fax #    Bong MADAY Che [phone] [address] [facility] [address]        Equal Access to Services     [facility]: Hadrodrigo oconnell Soinez, waaxda luqadaha, qaybta kaalmada jerod bella. So [facility] [phone].    ATENCIÓN: Si habla español, tiene a shelby disposición servicios gratuitos de asistencia lingüística. Llame al [phone].    We comply with applicable federal civil rights laws and [place] laws. We do not discriminate on the basis of race, color, national origin, age, disability, sex, sexual orientation, or gender identity.            Thank you!      Thank you for choosing [facility]  for your care. Our goal is always to provide you with excellent care. Hearing back from our patients is one way we can continue to improve our services. Please take a few minutes to complete the written survey that you may receive in the mail after your visit with us. Thank you!             Your Updated Medication List - Protect others around you: Learn how to safely use, store and throw away your medicines at www.disposemymeds.org.          This list is accurate as of 8/27/18 11:12 AM.  Always use your most recent med list.                   Brand Name Dispense Instructions for use Diagnosis    famotidine 20 MG tablet    PEPCID    180 tablet    Take 1 tablet (20 mg) by mouth 2 times daily    Gastroesophageal reflux disease with esophagitis       pantoprazole 40 MG EC tablet    PROTONIX    90 tablet    Take 1 tablet by mouth once daily.    Gastroesophageal reflux disease with esophagitis

## 2018-09-20 ENCOUNTER — OFFICE VISIT (OUTPATIENT)
Dept: OBGYN | Facility: OTHER | Age: 43
End: 2018-09-20
Attending: OBSTETRICS & GYNECOLOGY
Payer: COMMERCIAL

## 2018-09-20 VITALS
WEIGHT: 207.3 LBS | HEART RATE: 76 BPM | BODY MASS INDEX: 34.5 KG/M2 | DIASTOLIC BLOOD PRESSURE: 84 MMHG | SYSTOLIC BLOOD PRESSURE: 132 MMHG

## 2018-09-20 DIAGNOSIS — Z98.890 POSTOPERATIVE STATE: Primary | ICD-10-CM

## 2018-09-20 PROCEDURE — 99024 POSTOP FOLLOW-UP VISIT: CPT | Performed by: OBSTETRICS & GYNECOLOGY

## 2018-09-20 ASSESSMENT — PAIN SCALES - GENERAL: PAINLEVEL: NO PAIN (0)

## 2018-09-20 NOTE — PROGRESS NOTES
Yanna Ordonez presents todayfor a postop checkup at 6 weeks after vaginal hysterectomy and LSO.    S: Bowels and bladder are functioning normally, no abnormal bleeding or pain. No concerns about the incision(s).    Current Outpatient Prescriptions   Medication     famotidine (PEPCID) 20 MG tablet     pantoprazole (PROTONIX) 40 MG EC tablet     No current facility-administered medications for this visit.      No Known Allergies  Past Medical History:   Diagnosis Date     Personal history of other medical treatment (CODE)     Childbirth     Past Surgical History:   Procedure Laterality Date     DILATION AND CURETTAGE      1992,after SAB     HYSTERECTOMY VAGINAL N/A 8/8/2018    Procedure: HYSTERECTOMY VAGINAL;  Total Vaginal Hysterectomy and Bilateral Salpingectomy, Left oophorectomy;  Surgeon: Demarco Verdugo MD;  Location:  OR       COMPLETE REVIEW OF SYSTEMS: neg        O: /84 (BP Location: Right arm)  Pulse 76  Wt 94 kg (207 lb 4.8 oz)  LMP 06/02/2018  BMI 34.5 kg/m2 Body mass index is 34.5 kg/(m^2).    EXAM: Pelvic: Speculum demonstrates a well-healed well-positioned vaginal cuff, Moise swab was used to wipe away a few residual sutures.  No granulation or bleeding noted.  Bimanual exam reveals a well supported cuff with no pelvic or adnexal masses.        I/P:  Stable postop  She no longer requires Pap smears.  I would recommend a pelvic exam every few years to palpate the remaining right ovary.  She should resume annual breast cancer screening per her primary physician.  She is off all restrictions at this point.      Demarco Verdugo MD FACOG  3:49 PM 9/20/2018

## 2018-09-20 NOTE — MR AVS SNAPSHOT
After Visit Summary   9/20/2018    Yanna Ordonez    MRN: [id number]           Patient Information     Date Of Birth          1975        Visit Information        Provider Department      9/20/2018 3:45 PM Demarco Verdugo MD [facility]        Today's Diagnoses     Postoperative state    -  1       Follow-ups after your visit        Who to contact     If you have questions or need follow up information about today's clinic visit or your schedule please contact [facility] directly at [phone].  Normal or non-critical lab and imaging results will be communicated to you by Inflection Energyhart, letter or phone within 4 business days after the clinic has received the results. If you do not hear from us within 7 days, please contact the clinic through HyTrust or phone. If you have a critical or abnormal lab result, we will notify you by phone as soon as possible.  Submit refill requests through HyTrust or call your pharmacy and they will forward the refill request to us. Please allow 3 business days for your refill to be completed.          Additional Information About Your Visit        MyChart Information     HyTrust gives you secure access to your electronic health record. If you see a primary care provider, you can also send messages to your care team and make appointments. If you have questions, please call your primary care clinic.  If you do not have a primary care provider, please call [phone] and they will assist you.        Care EveryWhere ID     This is your Care EveryWhere ID. This could be used by other organizations to access your [facility] medical records  [id number]        Your Vitals Were     Pulse Last Period BMI (Body Mass Index)             76 06/02/2018 34.5 kg/m2          Blood Pressure from Last 3 Encounters:   09/20/18 132/84   08/27/18 122/84   08/09/18 114/84    Weight from Last 3 Encounters:   09/20/18 94 kg (207 lb 4.8 oz)    08/27/18 91.9 kg (202 lb 8 oz)   08/08/18 90 kg (198 lb 8 oz)              Today, you had the following     No orders found for display       Primary Care Provider Office Phone # Fax #    Bong Che [phone] [address]  [place] [address]        Equal Access to Services     [facility]: Hadii aad ku hadasho Soomaali, waaxda luqadaha, qaybta kaalmada adeegyada, waxay idiin hayaan adeeg kharash la'aan . So [facility] [phone].    ATENCIÓN: Si habla español, tiene a shelby disposición servicios gratuitos de asistencia lingüística. Llame al [phone].    We comply with applicable federal civil rights laws and [place] laws. We do not discriminate on the basis of race, color, national origin, age, disability, sex, sexual orientation, or gender identity.            Thank you!     Thank you for choosing [facility]  for your care. Our goal is always to provide you with excellent care. Hearing back from our patients is one way we can continue to improve our services. Please take a few minutes to complete the written survey that you may receive in the mail after your visit with us. Thank you!             Your Updated Medication List - Protect others around you: Learn how to safely use, store and throw away your medicines at www.disposemymeds.org.          This list is accurate as of 9/20/18  4:07 PM.  Always use your most recent med list.                   Brand Name Dispense Instructions for use Diagnosis    famotidine 20 MG tablet    PEPCID    180 tablet    Take 1 tablet (20 mg) by mouth 2 times daily    Gastroesophageal reflux disease with esophagitis       pantoprazole 40 MG EC tablet    PROTONIX    90 tablet    Take 1 tablet by mouth once daily.    Gastroesophageal reflux disease with esophagitis

## 2018-12-09 ENCOUNTER — TRANSFERRED RECORDS (OUTPATIENT)
Dept: HEALTH INFORMATION MANAGEMENT | Facility: OTHER | Age: 43
End: 2018-12-09

## 2019-07-12 ENCOUNTER — ALLIED HEALTH/NURSE VISIT (OUTPATIENT)
Dept: FAMILY MEDICINE | Facility: OTHER | Age: 44
End: 2019-07-12
Payer: COMMERCIAL

## 2019-07-12 VITALS — DIASTOLIC BLOOD PRESSURE: 88 MMHG | SYSTOLIC BLOOD PRESSURE: 140 MMHG

## 2019-07-12 DIAGNOSIS — Z01.89 PATIENT REQUESTED DIAGNOSTIC TESTING: Primary | ICD-10-CM

## 2020-09-09 ENCOUNTER — RESULTS ONLY (OUTPATIENT)
Dept: LAB | Age: 45
End: 2020-09-09

## 2020-09-11 LAB
SARS-COV-2 RNA SPEC QL NAA+PROBE: NOT DETECTED
SPECIMEN SOURCE: NORMAL

## 2020-09-18 ENCOUNTER — RESULTS ONLY (OUTPATIENT)
Dept: LAB | Age: 45
End: 2020-09-18

## 2020-09-19 LAB
SARS-COV-2 RNA SPEC QL NAA+PROBE: ABNORMAL
SPECIMEN SOURCE: ABNORMAL

## 2021-01-03 ENCOUNTER — HEALTH MAINTENANCE LETTER (OUTPATIENT)
Age: 46
End: 2021-01-03

## 2021-01-06 ENCOUNTER — OFFICE VISIT (OUTPATIENT)
Dept: FAMILY MEDICINE | Facility: OTHER | Age: 46
End: 2021-01-06
Attending: FAMILY MEDICINE
Payer: COMMERCIAL

## 2021-01-06 VITALS
DIASTOLIC BLOOD PRESSURE: 68 MMHG | HEART RATE: 96 BPM | HEIGHT: 65 IN | RESPIRATION RATE: 18 BRPM | BODY MASS INDEX: 35.32 KG/M2 | WEIGHT: 212 LBS | SYSTOLIC BLOOD PRESSURE: 120 MMHG | TEMPERATURE: 98.8 F

## 2021-01-06 DIAGNOSIS — K21.00 GASTROESOPHAGEAL REFLUX DISEASE WITH ESOPHAGITIS WITHOUT HEMORRHAGE: ICD-10-CM

## 2021-01-06 DIAGNOSIS — K44.9 HIATAL HERNIA: ICD-10-CM

## 2021-01-06 DIAGNOSIS — L50.8 RECURRENT PERIODIC URTICARIA: ICD-10-CM

## 2021-01-06 DIAGNOSIS — M19.042 ARTHRITIS OF FINGER OF BOTH HANDS: ICD-10-CM

## 2021-01-06 DIAGNOSIS — L30.1 DYSHIDROTIC ECZEMA: ICD-10-CM

## 2021-01-06 DIAGNOSIS — Z86.16 HISTORY OF 2019 NOVEL CORONAVIRUS DISEASE (COVID-19): Primary | ICD-10-CM

## 2021-01-06 DIAGNOSIS — M19.041 ARTHRITIS OF FINGER OF BOTH HANDS: ICD-10-CM

## 2021-01-06 PROCEDURE — 99214 OFFICE O/P EST MOD 30 MIN: CPT | Performed by: FAMILY MEDICINE

## 2021-01-06 RX ORDER — FAMOTIDINE 20 MG/1
20 TABLET, FILM COATED ORAL 2 TIMES DAILY
Qty: 180 TABLET | Refills: 3 | Status: ON HOLD | OUTPATIENT
Start: 2021-01-06 | End: 2021-04-22

## 2021-01-06 RX ORDER — PANTOPRAZOLE SODIUM 40 MG/1
TABLET, DELAYED RELEASE ORAL
Qty: 90 TABLET | Refills: 3 | Status: ON HOLD | OUTPATIENT
Start: 2021-01-06 | End: 2021-04-22

## 2021-01-06 RX ORDER — TRIAMCINOLONE ACETONIDE 1 MG/G
CREAM TOPICAL 2 TIMES DAILY PRN
Qty: 45 G | Refills: 11 | Status: ON HOLD | OUTPATIENT
Start: 2021-01-06 | End: 2021-04-21

## 2021-01-06 ASSESSMENT — MIFFLIN-ST. JEOR: SCORE: 1597.51

## 2021-01-06 ASSESSMENT — PAIN SCALES - GENERAL: PAINLEVEL: MILD PAIN (2)

## 2021-01-06 NOTE — PATIENT INSTRUCTIONS
Take vitamin D3 at least 50 mcg daily (2,000 units)    Triamcinolone cream to help with potential eczema on hands  Moisturizer is the best first treatment for eczema    Loratadine can help prevent hives. If you had a bad case, then take 10 mg twice daily

## 2021-01-06 NOTE — PROGRESS NOTES
"Nursing Notes:   Rosenda Dahl LPN  1/6/2021 10:00 AM  Sign at exiting of workspace  Patient presents to the clinic for headache with low temperature yesterday.  This occurred once last month too.      Chief Complaint   Patient presents with     Headache       Initial /68 (BP Location: Right arm, Patient Position: Sitting, Cuff Size: Adult Regular)   Pulse 96   Temp 98.8  F (37.1  C) (Tympanic)   Resp 18   Ht 1.651 m (5' 5\")   Wt 96.2 kg (212 lb)   LMP 06/02/2018   BMI 35.28 kg/m   Estimated body mass index is 35.28 kg/m  as calculated from the following:    Height as of this encounter: 1.651 m (5' 5\").    Weight as of this encounter: 96.2 kg (212 lb).  Medication Reconciliation: complete    Rosenda Dahl LPN           Assessment & Plan       ICD-10-CM    1. History of 2019 novel coronavirus disease (COVID-19)  Z86.16    2. Dyshidrotic eczema  L30.1 triamcinolone (KENALOG) 0.1 % external cream   3. Gastroesophageal reflux disease with esophagitis without hemorrhage  K21.00 pantoprazole (PROTONIX) 40 MG EC tablet     GENERAL SURG ADULT REFERRAL     famotidine (PEPCID) 20 MG tablet   4. Hiatal hernia  K44.9 GENERAL SURG ADULT REFERRAL   5. Arthritis of finger of both hands  M19.041 diclofenac (VOLTAREN) 1 % topical gel    M19.042    6. Recurrent periodic urticaria  L50.8      She is reporting systemic symptoms along with slightly low temperature.  This has been seen since she had Covid.  We discussed that this is a relatively common report and that his immune system response.  Likely exacerbated by recent Covid vaccine.  In the past she had thyroid testing and testing for autoimmune conditions that was negative.  We could repeat testing at this time, but my suspicion of finding anything is low.  She is comfortable avoiding lab tests at this time.  No specific treatment indicated at this time for likely post-COVID symptoms.    Recommend use of vitamin D3 at least 50 mcg daily for health maintenance. "  We could check a vitamin D level as there is some association of worse Covid symptoms and deficiency, but for now she will try supplementation.    Report of dyshidrotic eczema in the summer.  At this time she may be having some mild eczema due to hand  use.  Use moisturizer regularly.  May use triamcinolone cream as well to help with flareups.    Long-term reflux symptoms likely due to hiatal hernia.  She has used PPIs in the past.  We discussed long-term harms with PPI use.  She would prefer to avoid medication.  Referral placed to general surgery to discuss antireflux surgery options.  As noted below, last EGD December 2016.  In the interim she can use pantoprazole 40 mg daily.  Requires famotidine 20 mg twice daily for breakthrough symptoms at times.    Try diclofenac gel to help with arthritis of fingers at the DIP joints.    Can try loratadine 10 mg daily to help prevent hives or consider 10 mg twice daily if an episode urticaria does not resolve.      35 minutes spent on the date of the encounter doing chart review, history and exam, documentation and further activities as noted above    Follow up as needed     Chon Cedeno MD   [facility]      SUBJECTIVE:  45 year old female presents for various concerns.     Low temp and headache yesterday. Improved with a hot bath. Headache still present today. Feels nauseated and achy.   Just had COVID vaccine 2 days ago.   Similar episode happened a month ago. Had COVID in September and had significant symptoms at the time with generalized malaise, fever, cough, headache, body aches.    She is also concerned about brittle nails.  This has been present for quite some time.  She does take a multivitamin daily.    Skin on her hands seems to be more dry recently as well.  She reports what sounds to be dyshidrotic eczema in the past in the summertime.  Did not really use any thing to help it at the time.  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  Does use moisturizer frequently.    Notes a rash on her neck and sometimes in other locations.  Sounds to be hives. Periodic urticaria in the past, even prior to COVID.  She will have urticaria for 30 minutes and then it will spontaneously resolve.  Happens a couple times in a month.  She tried keeping a food journal, but did not see a correlation.  Has not tried a daily antihistamine to prevent.    Used to be on PPI all the time, but has not been in to see a provider lately and she ran out.  Having daily heartburn.  Recently started omeprazole, but is not working very well.  She has tried taking famotidine twice daily without benefit.  In December 2016 she had an EGD showing a hiatal hernia, but no esophagitis or gastritis.    Additionally, has some discomfort in distal fingers, appearance consistent with DJD.    REVIEW OF SYSTEMS:    Pertinent items are noted in HPI.  Some low back pain recently since Covid.    Current Outpatient Medications   Medication Sig Dispense Refill     famotidine (PEPCID) 20 MG tablet Take 1 tablet (20 mg) by mouth 2 times daily 180 tablet 3     pantoprazole (PROTONIX) 40 MG EC tablet Take 1 tablet by mouth once daily. 90 tablet 3     No Known Allergies    OBJECTIVE:  /68 (BP Location: Right arm, Patient Position: Sitting, Cuff Size: Adult Regular)   Pulse 96   Temp 98.8  F (37.1  C) (Tympanic)   Resp 18   Ht 1.651 m (5' 5\")   Wt 96.2 kg (212 lb)   LMP 06/02/2018   BMI 35.28 kg/m      EXAM:  General Appearance: Alert. No acute distress  Psychiatric: Normal affect and mentation  Skin: No rashes on body, specifically no rash behind ears or posterior scalp.  Nails with some signs of brittleness on the end.  No pitting as would be seen with psoriasis.  Skin on fingers appears dry.  Musculoskeletal: Slight deviation of fingers at DIP consistent with DJD.  Tender in lumbar paraspinous muscles.  Nontender over vertebrae and SI joints.      This document was " prepared using a combination of typing and voice generated software.  While every attempt was made for accuracy, spelling and grammatical errors may exist.

## 2021-01-06 NOTE — NURSING NOTE
"Patient presents to the clinic for headache with low temperature yesterday.  This occurred once last month too.      Chief Complaint   Patient presents with     Headache       Initial /68 (BP Location: Right arm, Patient Position: Sitting, Cuff Size: Adult Regular)   Pulse 96   Temp 98.8  F (37.1  C) (Tympanic)   Resp 18   Ht 1.651 m (5' 5\")   Wt 96.2 kg (212 lb)   LMP 06/02/2018   BMI 35.28 kg/m   Estimated body mass index is 35.28 kg/m  as calculated from the following:    Height as of this encounter: 1.651 m (5' 5\").    Weight as of this encounter: 96.2 kg (212 lb).  Medication Reconciliation: complete    Rosenda Dahl LPN      "

## 2021-01-07 PROBLEM — M19.041 ARTHRITIS OF FINGER OF BOTH HANDS: Status: ACTIVE | Noted: 2021-01-07

## 2021-01-07 PROBLEM — K21.00 GASTROESOPHAGEAL REFLUX DISEASE WITH ESOPHAGITIS WITHOUT HEMORRHAGE: Status: ACTIVE | Noted: 2021-01-07

## 2021-01-07 PROBLEM — M19.042 ARTHRITIS OF FINGER OF BOTH HANDS: Status: ACTIVE | Noted: 2021-01-07

## 2021-01-07 PROBLEM — Z86.16 HISTORY OF 2019 NOVEL CORONAVIRUS DISEASE (COVID-19): Status: ACTIVE | Noted: 2021-01-07

## 2021-01-07 PROBLEM — L30.1 DYSHIDROTIC ECZEMA: Status: ACTIVE | Noted: 2021-01-07

## 2021-01-07 PROBLEM — L50.8 RECURRENT PERIODIC URTICARIA: Status: ACTIVE | Noted: 2021-01-07

## 2021-01-14 ENCOUNTER — OFFICE VISIT (OUTPATIENT)
Dept: SURGERY | Facility: OTHER | Age: 46
End: 2021-01-14
Attending: FAMILY MEDICINE
Payer: COMMERCIAL

## 2021-01-14 VITALS
SYSTOLIC BLOOD PRESSURE: 140 MMHG | WEIGHT: 218.4 LBS | RESPIRATION RATE: 16 BRPM | BODY MASS INDEX: 36.34 KG/M2 | TEMPERATURE: 97.7 F | HEART RATE: 72 BPM | DIASTOLIC BLOOD PRESSURE: 72 MMHG

## 2021-01-14 DIAGNOSIS — K21.00 GASTROESOPHAGEAL REFLUX DISEASE WITH ESOPHAGITIS WITHOUT HEMORRHAGE: ICD-10-CM

## 2021-01-14 DIAGNOSIS — K44.9 HIATAL HERNIA: ICD-10-CM

## 2021-01-14 PROCEDURE — 99204 OFFICE O/P NEW MOD 45 MIN: CPT | Performed by: SURGERY

## 2021-01-14 ASSESSMENT — PAIN SCALES - GENERAL: PAINLEVEL: NO PAIN (0)

## 2021-01-14 NOTE — NURSING NOTE
"Chief Complaint   Patient presents with     Consult     Gerd       Initial BP (!) 140/72 (BP Location: Right arm, Patient Position: Sitting, Cuff Size: Adult Regular)   Pulse 72   Temp 97.7  F (36.5  C) (Tympanic)   Resp 16   Wt 99.1 kg (218 lb 6.4 oz)   LMP 06/02/2018   Breastfeeding No   BMI 36.34 kg/m   Estimated body mass index is 36.34 kg/m  as calculated from the following:    Height as of 1/6/21: 1.651 m (5' 5\").    Weight as of this encounter: 99.1 kg (218 lb 6.4 oz).  Medication Reconciliation: Completed     Laura Vyas LPN  "

## 2021-01-14 NOTE — PROGRESS NOTES
GENERAL SURGERY CONSULTATION NOTE    Yanna Ordonez   [address] [facility] [address]  45 year old  female    Primary Care Provider:  No Ref-Primary, Physician      HPI: Yanna Ordonez presents to clinic with GERD.  Patient states she has had GERD for most of her adult life recently, in the last year or so got much worse.  She takes a proton pump inhibitor, Tums, famotidine daily to control her symptoms.  Despite this regimen, she still complains of burning sensation in her chest with reflux and she has regurgitation as well.  Patient denies dysphagia, voice change, new diagnosis of asthma.  Patient does already do some lifestyle changes such as not eating several hours before bed, sleeping with complex pillows to prop her head and she avoids caffeine and alcohol.   Patient underwent upper endoscopy roughly 3 years ago is significant for moderate sized hiatal hernia.  Esophageal biopsies at that time were negative for reflux esophagitis.      REVIEW OF SYSTEMS:    GENERAL: No fevers or chills. Denies fatigue, recent weight loss.  HEENT: No sinus drainage. No changes with vision or hearing. No difficulty swallowing.   LYMPHATICS:  Noswollen nodes in axilla, neck or groin.  CARDIOVASCULAR: Denies chest pain, palpitations and dyspnea on exertion.  PULMONARY: No shortness of breath or cough. No increase in sputum production.  GI: Denies melena,bright red blood in stools. No hematemesis. No constipation or diarrhea.  : No dysuria or hematuria.  SKIN: No recent rashes or ulcers.   HEMATOLOGY:  No history of easy bruising or bleeding.  ENDOCRINE:  No history of diabetes or thyroid problems.  NEUROLOGY:  No history of seizures or headaches. No motor or sensory changes.        Patient Active Problem List   Diagnosis     Carpal tunnel syndrome, bilateral     Hiatal hernia     Neck pain, chronic     Excessive or frequent menstruation     Lightheadedness     S/P hysterectomy     Recurrent periodic urticaria      Arthritis of finger of both hands     Gastroesophageal reflux disease with esophagitis without hemorrhage     Dyshidrotic eczema     History of 2019 novel coronavirus disease (COVID-19)       Past Medical History:   Diagnosis Date     Personal history of other medical treatment (CODE)     Childbirth       Past Surgical History:   Procedure Laterality Date     DILATION AND CURETTAGE      1992,after SAB     HYSTERECTOMY VAGINAL N/A 8/8/2018    Procedure: HYSTERECTOMY VAGINAL;  Total Vaginal Hysterectomy and Bilateral Salpingectomy, Left oophorectomy;  Surgeon: Demarco Verdugo MD;  Location:  OR       Family History   Problem Relation Age of Onset     Other - See Comments Mother 65        Celiac disease     Other - See Comments Father         Glaucoma     Family History Negative Sister         Good Health     Family History Negative Sister         Good Health     Family History Negative Brother         Good Health     Family History Negative Brother         Good Health,Twin     Family History Negative Brother         Good Health,Twin - Had GIB     Heart Disease No family hx of         Heart Disease,No known premature CAD     Diabetes No family hx of         Diabetes,DM2     Breast Cancer No family hx of         Cancer-breast     Colon Cancer No family hx of         Cancer-colon       Social History     Social History Narrative    Lives with  and three children ,   Poonam, 1993 -  at [employer], 1996 -  at [employer], 1998 - Graduated.   Works at [employer].       Social History     Socioeconomic History     Marital status:      Spouse name: Jitendra     Number of children: Not on file     Years of education: Not on file     Highest education level: Not on file   Occupational History     Not on file   Social Needs     Financial resource strain: Not on file     Food insecurity     Worry: Not on file     Inability: Not on file     Transportation needs      Medical: Not on file     Non-medical: Not on file   Tobacco Use     Smoking status: Former Smoker     Packs/day: 0.50     Types: Cigarettes     Quit date: 10/1/2015     Years since quittin.2     Smokeless tobacco: Never Used     Tobacco comment: Quit smoking: Had quit for 6 years and started again about a month ago   Substance and Sexual Activity     Alcohol use: No     Alcohol/week: 1.7 standard drinks     Drug use: No     Sexual activity: Yes     Partners: Male     Birth control/protection: Female Surgical     Comment: Hysterectomy    Lifestyle     Physical activity     Days per week: Not on file     Minutes per session: Not on file     Stress: Not on file   Relationships     Social connections     Talks on phone: Not on file     Gets together: Not on file     Attends Rastafarian service: Not on file     Active member of club or organization: Not on file     Attends meetings of clubs or organizations: Not on file     Relationship status: Not on file     Intimate partner violence     Fear of current or ex partner: Not on file     Emotionally abused: Not on file     Physically abused: Not on file     Forced sexual activity: Not on file   Other Topics Concern     Parent/sibling w/ CABG, MI or angioplasty before 65F 55M? Not Asked   Social History Narrative    Lives with  and three children ,   Poonam,  -  at [employer],  -  at [employer], 1998 - Graduated.   Works at [employer].            diclofenac (VOLTAREN) 1 % topical gel, Apply 2 g topically 4 times daily       famotidine (PEPCID) 20 MG tablet, Take 1 tablet (20 mg) by mouth 2 times daily       pantoprazole (PROTONIX) 40 MG EC tablet, Take 1 tablet by mouth once daily.       triamcinolone (KENALOG) 0.1 % external cream, Apply topically 2 times daily as needed for irritation    No current facility-administered medications on file prior to visit.         ALLERGIES/SENSITIVITIES: No Known  Allergies    PHYSICAL EXAM:     BP (!) 140/72 (BP Location: Right arm, Patient Position: Sitting, Cuff Size: Adult Regular)   Pulse 72   Temp 97.7  F (36.5  C) (Tympanic)   Resp 16   Wt 99.1 kg (218 lb 6.4 oz)   LMP 06/02/2018   Breastfeeding No   BMI 36.34 kg/m      General Appearance:   Sitting up in the chair, no apparent distress  HEENT: Pupils are equal and reactive, no scleral icterus,   Heart & CV:  RRR no murmur.  Intact distal pulses, good cap refill.  LUNGS: No increased work of breathing.Lugns are CTA B/L, no wheezing or crackles.  Abd: Soft, nontender, nondistended  Ext: Normal bulk and tone, no lower extremity edema  Neuro: Alert and oriented, normal speech and mentation        CONSULTATION ASSESSMENT AND PLAN:    45 year old female with GERD that is refractory to medical treatment.  Patient is already on basically maximal medical therapy and continues to have GERD symptoms.  I believe the patient will ultimately be candidate for antireflux surgery.  However, we need to work-up the patient appropriately in preparation for this.  I would recommend EGD to reevaluate her esophagus stomach anatomy as well as check her level of reflux with Bravo pH study.  Also, patient should undergo manometry to ensure normally functioning esophagus.  The technical details of EGD with Bravo placement were discussed with the patient all the risk and benefits include bleeding, infection, perforation, aspiration, foreign body sensation, malfunction or malposition of the Bravo device.  The patient demonstrated understanding is willing proceed.    Schedule for EGD with Bravo pH probe placement  Preop Covid test  Esophageal manometry        Damien Ferguson MD on 1/14/2021 at 11:04 AM

## 2021-01-14 NOTE — H&P (VIEW-ONLY)
GENERAL SURGERY CONSULTATION NOTE    Yanna Ordonez   [address] [facility] [address]  45 year old  female    Primary Care Provider:  No Ref-Primary, Physician      HPI: Yanna Ordonez presents to clinic with GERD.  Patient states she has had GERD for most of her adult life recently, in the last year or so got much worse.  She takes a proton pump inhibitor, Tums, famotidine daily to control her symptoms.  Despite this regimen, she still complains of burning sensation in her chest with reflux and she has regurgitation as well.  Patient denies dysphagia, voice change, new diagnosis of asthma.  Patient does already do some lifestyle changes such as not eating several hours before bed, sleeping with complex pillows to prop her head and she avoids caffeine and alcohol.   Patient underwent upper endoscopy roughly 3 years ago is significant for moderate sized hiatal hernia.  Esophageal biopsies at that time were negative for reflux esophagitis.      REVIEW OF SYSTEMS:    GENERAL: No fevers or chills. Denies fatigue, recent weight loss.  HEENT: No sinus drainage. No changes with vision or hearing. No difficulty swallowing.   LYMPHATICS:  Noswollen nodes in axilla, neck or groin.  CARDIOVASCULAR: Denies chest pain, palpitations and dyspnea on exertion.  PULMONARY: No shortness of breath or cough. No increase in sputum production.  GI: Denies melena,bright red blood in stools. No hematemesis. No constipation or diarrhea.  : No dysuria or hematuria.  SKIN: No recent rashes or ulcers.   HEMATOLOGY:  No history of easy bruising or bleeding.  ENDOCRINE:  No history of diabetes or thyroid problems.  NEUROLOGY:  No history of seizures or headaches. No motor or sensory changes.        Patient Active Problem List   Diagnosis     Carpal tunnel syndrome, bilateral     Hiatal hernia     Neck pain, chronic     Excessive or frequent menstruation     Lightheadedness     S/P hysterectomy     Recurrent periodic urticaria      Arthritis of finger of both hands     Gastroesophageal reflux disease with esophagitis without hemorrhage     Dyshidrotic eczema     History of 2019 novel coronavirus disease (COVID-19)       Past Medical History:   Diagnosis Date     Personal history of other medical treatment (CODE)     Childbirth       Past Surgical History:   Procedure Laterality Date     DILATION AND CURETTAGE      1992,after SAB     HYSTERECTOMY VAGINAL N/A 8/8/2018    Procedure: HYSTERECTOMY VAGINAL;  Total Vaginal Hysterectomy and Bilateral Salpingectomy, Left oophorectomy;  Surgeon: Demarco Verdugo MD;  Location:  OR       Family History   Problem Relation Age of Onset     Other - See Comments Mother 65        Celiac disease     Other - See Comments Father         Glaucoma     Family History Negative Sister         Good Health     Family History Negative Sister         Good Health     Family History Negative Brother         Good Health     Family History Negative Brother         Good Health,Twin     Family History Negative Brother         Good Health,Twin - Had GIB     Heart Disease No family hx of         Heart Disease,No known premature CAD     Diabetes No family hx of         Diabetes,DM2     Breast Cancer No family hx of         Cancer-breast     Colon Cancer No family hx of         Cancer-colon       Social History     Social History Narrative    Lives with  and three children ,   Poonam, 1993 -  at [employer], 1996 -  at [employer], 1998 - Graduated.   Works at [employer].       Social History     Socioeconomic History     Marital status:      Spouse name: Jitendra     Number of children: Not on file     Years of education: Not on file     Highest education level: Not on file   Occupational History     Not on file   Social Needs     Financial resource strain: Not on file     Food insecurity     Worry: Not on file     Inability: Not on file     Transportation needs      Medical: Not on file     Non-medical: Not on file   Tobacco Use     Smoking status: Former Smoker     Packs/day: 0.50     Types: Cigarettes     Quit date: 10/1/2015     Years since quittin.2     Smokeless tobacco: Never Used     Tobacco comment: Quit smoking: Had quit for 6 years and started again about a month ago   Substance and Sexual Activity     Alcohol use: No     Alcohol/week: 1.7 standard drinks     Drug use: No     Sexual activity: Yes     Partners: Male     Birth control/protection: Female Surgical     Comment: Hysterectomy    Lifestyle     Physical activity     Days per week: Not on file     Minutes per session: Not on file     Stress: Not on file   Relationships     Social connections     Talks on phone: Not on file     Gets together: Not on file     Attends Mandaen service: Not on file     Active member of club or organization: Not on file     Attends meetings of clubs or organizations: Not on file     Relationship status: Not on file     Intimate partner violence     Fear of current or ex partner: Not on file     Emotionally abused: Not on file     Physically abused: Not on file     Forced sexual activity: Not on file   Other Topics Concern     Parent/sibling w/ CABG, MI or angioplasty before 65F 55M? Not Asked   Social History Narrative    Lives with  and three children ,   Poonam,  -  at [employer],  -  at [employer], 1998 - Graduated.   Works at [employer].            diclofenac (VOLTAREN) 1 % topical gel, Apply 2 g topically 4 times daily       famotidine (PEPCID) 20 MG tablet, Take 1 tablet (20 mg) by mouth 2 times daily       pantoprazole (PROTONIX) 40 MG EC tablet, Take 1 tablet by mouth once daily.       triamcinolone (KENALOG) 0.1 % external cream, Apply topically 2 times daily as needed for irritation    No current facility-administered medications on file prior to visit.         ALLERGIES/SENSITIVITIES: No Known  Allergies    PHYSICAL EXAM:     BP (!) 140/72 (BP Location: Right arm, Patient Position: Sitting, Cuff Size: Adult Regular)   Pulse 72   Temp 97.7  F (36.5  C) (Tympanic)   Resp 16   Wt 99.1 kg (218 lb 6.4 oz)   LMP 06/02/2018   Breastfeeding No   BMI 36.34 kg/m      General Appearance:   Sitting up in the chair, no apparent distress  HEENT: Pupils are equal and reactive, no scleral icterus,   Heart & CV:  RRR no murmur.  Intact distal pulses, good cap refill.  LUNGS: No increased work of breathing.Lugns are CTA B/L, no wheezing or crackles.  Abd: Soft, nontender, nondistended  Ext: Normal bulk and tone, no lower extremity edema  Neuro: Alert and oriented, normal speech and mentation        CONSULTATION ASSESSMENT AND PLAN:    45 year old female with GERD that is refractory to medical treatment.  Patient is already on basically maximal medical therapy and continues to have GERD symptoms.  I believe the patient will ultimately be candidate for antireflux surgery.  However, we need to work-up the patient appropriately in preparation for this.  I would recommend EGD to reevaluate her esophagus stomach anatomy as well as check her level of reflux with Bravo pH study.  Also, patient should undergo manometry to ensure normally functioning esophagus.  The technical details of EGD with Bravo placement were discussed with the patient all the risk and benefits include bleeding, infection, perforation, aspiration, foreign body sensation, malfunction or malposition of the Bravo device.  The patient demonstrated understanding is willing proceed.    Schedule for EGD with Bravo pH probe placement  Preop Covid test  Esophageal manometry        Damien Ferguson MD on 1/14/2021 at 11:04 AM

## 2021-02-02 ENCOUNTER — TELEPHONE (OUTPATIENT)
Dept: SURGERY | Facility: OTHER | Age: 46
End: 2021-02-02

## 2021-02-02 NOTE — TELEPHONE ENCOUNTER
Yanna stated she was given permission to have covid test one day early due to living so far away. Called [facility] and Saud stated this was fine

## 2021-02-04 ENCOUNTER — HOSPITAL ENCOUNTER (OUTPATIENT)
Dept: MAMMOGRAPHY | Facility: OTHER | Age: 46
Discharge: HOME | End: 2021-02-04
Attending: NURSE PRACTITIONER | Admitting: NURSE PRACTITIONER
Payer: COMMERCIAL

## 2021-02-04 ENCOUNTER — RESULTS ONLY (OUTPATIENT)
Dept: LAB | Age: 46
End: 2021-02-04

## 2021-02-04 ENCOUNTER — APPOINTMENT (OUTPATIENT)
Dept: FAMILY MEDICINE | Facility: OTHER | Age: 46
End: 2021-02-04
Attending: CHIROPRACTOR

## 2021-02-04 ENCOUNTER — APPOINTMENT (OUTPATIENT)
Dept: FAMILY MEDICINE | Facility: OTHER | Age: 46
End: 2021-02-04
Attending: NURSE PRACTITIONER

## 2021-02-04 ENCOUNTER — ALLIED HEALTH/NURSE VISIT (OUTPATIENT)
Dept: FAMILY MEDICINE | Facility: OTHER | Age: 46
End: 2021-02-04
Attending: SURGERY
Payer: COMMERCIAL

## 2021-02-04 DIAGNOSIS — Z12.31 VISIT FOR SCREENING MAMMOGRAM: ICD-10-CM

## 2021-02-04 DIAGNOSIS — Z20.822 COVID-19 RULED OUT: Primary | ICD-10-CM

## 2021-02-04 LAB
ALBUMIN SERPL-MCNC: 4.8 G/DL (ref 3.5–5.7)
ALBUMIN UR-MCNC: NEGATIVE MG/DL
ALP SERPL-CCNC: 51 U/L (ref 34–104)
ALT SERPL W P-5'-P-CCNC: 19 U/L (ref 7–52)
ANION GAP SERPL CALCULATED.3IONS-SCNC: 8 MMOL/L (ref 3–14)
APPEARANCE UR: CLEAR
AST SERPL W P-5'-P-CCNC: 14 U/L (ref 13–39)
BILIRUB SERPL-MCNC: 0.4 MG/DL (ref 0.3–1)
BILIRUB UR QL STRIP: NEGATIVE
BUN SERPL-MCNC: 14 MG/DL (ref 7–25)
CALCIUM SERPL-MCNC: 9.7 MG/DL (ref 8.6–10.3)
CHLORIDE SERPL-SCNC: 105 MMOL/L (ref 98–107)
CHOLEST SERPL-MCNC: 231 MG/DL
CO2 SERPL-SCNC: 27 MMOL/L (ref 21–31)
COLOR UR AUTO: YELLOW
CREAT SERPL-MCNC: 0.84 MG/DL (ref 0.6–1.2)
GFR SERPL CREATININE-BSD FRML MDRD: 73 ML/MIN/{1.73_M2}
GLUCOSE SERPL-MCNC: 117 MG/DL (ref 70–105)
GLUCOSE UR STRIP-MCNC: NEGATIVE MG/DL
HDLC SERPL-MCNC: 53 MG/DL (ref 23–92)
HGB UR QL STRIP: NEGATIVE
KETONES UR STRIP-MCNC: NEGATIVE MG/DL
LDLC SERPL CALC-MCNC: 148 MG/DL
LEUKOCYTE ESTERASE UR QL STRIP: NEGATIVE
NITRATE UR QL: NEGATIVE
NONHDLC SERPL-MCNC: 178 MG/DL
PH UR STRIP: 7 PH (ref 5–7)
POTASSIUM SERPL-SCNC: 4.1 MMOL/L (ref 3.5–5.1)
PROT SERPL-MCNC: 7.6 G/DL (ref 6.4–8.9)
SODIUM SERPL-SCNC: 140 MMOL/L (ref 134–144)
SOURCE: NORMAL
SP GR UR STRIP: 1 (ref 1–1.03)
TRIGL SERPL-MCNC: 148 MG/DL
UROBILINOGEN UR STRIP-MCNC: NORMAL MG/DL (ref 0–2)

## 2021-02-04 PROCEDURE — 99499 UNLISTED E&M SERVICE: CPT | Performed by: CHIROPRACTOR

## 2021-02-04 PROCEDURE — C9803 HOPD COVID-19 SPEC COLLECT: HCPCS

## 2021-02-04 PROCEDURE — 77063 BREAST TOMOSYNTHESIS BI: CPT

## 2021-02-04 PROCEDURE — 93005 ELECTROCARDIOGRAM TRACING: CPT | Performed by: INTERNAL MEDICINE

## 2021-02-04 PROCEDURE — 99212 OFFICE O/P EST SF 10 MIN: CPT | Performed by: NURSE PRACTITIONER

## 2021-02-04 PROCEDURE — 87635 SARS-COV-2 COVID-19 AMP PRB: CPT | Mod: ZL | Performed by: SURGERY

## 2021-02-05 LAB
SARS-COV-2 RNA RESP QL NAA+PROBE: NORMAL
SPECIMEN SOURCE: NORMAL

## 2021-02-06 LAB
LABORATORY COMMENT REPORT: NORMAL
SARS-COV-2 RNA RESP QL NAA+PROBE: NEGATIVE
SPECIMEN SOURCE: NORMAL

## 2021-02-09 ENCOUNTER — HOSPITAL ENCOUNTER (OUTPATIENT)
Facility: OTHER | Age: 46
Discharge: HOME | End: 2021-02-09
Attending: SURGERY | Admitting: SURGERY
Payer: COMMERCIAL

## 2021-02-09 ENCOUNTER — ANESTHESIA EVENT (OUTPATIENT)
Dept: SURGERY | Facility: OTHER | Age: 46
End: 2021-02-09
Payer: COMMERCIAL

## 2021-02-09 ENCOUNTER — ANESTHESIA (OUTPATIENT)
Dept: SURGERY | Facility: OTHER | Age: 46
End: 2021-02-09
Payer: COMMERCIAL

## 2021-02-09 VITALS
HEIGHT: 65 IN | RESPIRATION RATE: 16 BRPM | DIASTOLIC BLOOD PRESSURE: 93 MMHG | BODY MASS INDEX: 35.32 KG/M2 | SYSTOLIC BLOOD PRESSURE: 139 MMHG | OXYGEN SATURATION: 96 % | WEIGHT: 212 LBS | TEMPERATURE: 97.9 F | HEART RATE: 65 BPM

## 2021-02-09 DIAGNOSIS — K20.90 ESOPHAGITIS: ICD-10-CM

## 2021-02-09 DIAGNOSIS — K44.9 HIATAL HERNIA: Primary | ICD-10-CM

## 2021-02-09 PROCEDURE — 258N000003 HC RX IP 258 OP 636: Performed by: SURGERY

## 2021-02-09 PROCEDURE — 250N000011 HC RX IP 250 OP 636: Performed by: NURSE ANESTHETIST, CERTIFIED REGISTERED

## 2021-02-09 PROCEDURE — 43239 EGD BIOPSY SINGLE/MULTIPLE: CPT | Performed by: NURSE ANESTHETIST, CERTIFIED REGISTERED

## 2021-02-09 PROCEDURE — 250N000009 HC RX 250: Performed by: SURGERY

## 2021-02-09 PROCEDURE — 91035 G-ESOPH REFLX TST W/ELECTROD: CPT | Performed by: SURGERY

## 2021-02-09 PROCEDURE — 88305 TISSUE EXAM BY PATHOLOGIST: CPT

## 2021-02-09 PROCEDURE — 43239 EGD BIOPSY SINGLE/MULTIPLE: CPT

## 2021-02-09 PROCEDURE — 250N000009 HC RX 250: Performed by: NURSE ANESTHETIST, CERTIFIED REGISTERED

## 2021-02-09 PROCEDURE — 250N000013 HC RX MED GY IP 250 OP 250 PS 637: Performed by: SURGERY

## 2021-02-09 PROCEDURE — 43239 EGD BIOPSY SINGLE/MULTIPLE: CPT | Performed by: SURGERY

## 2021-02-09 PROCEDURE — 999N000010 HC STATISTIC ANES STAT CODE-CRNA PER MINUTE: Performed by: SURGERY

## 2021-02-09 RX ORDER — SIMETHICONE
LIQUID (ML) MISCELLANEOUS PRN
Status: DISCONTINUED | OUTPATIENT
Start: 2021-02-09 | End: 2021-02-09 | Stop reason: HOSPADM

## 2021-02-09 RX ORDER — PROPOFOL 10 MG/ML
INJECTION, EMULSION INTRAVENOUS PRN
Status: DISCONTINUED | OUTPATIENT
Start: 2021-02-09 | End: 2021-02-09

## 2021-02-09 RX ORDER — LIDOCAINE 40 MG/G
CREAM TOPICAL
Status: DISCONTINUED | OUTPATIENT
Start: 2021-02-09 | End: 2021-02-09 | Stop reason: HOSPADM

## 2021-02-09 RX ORDER — NALOXONE HYDROCHLORIDE 0.4 MG/ML
0.4 INJECTION, SOLUTION INTRAMUSCULAR; INTRAVENOUS; SUBCUTANEOUS
Status: DISCONTINUED | OUTPATIENT
Start: 2021-02-09 | End: 2021-02-09 | Stop reason: HOSPADM

## 2021-02-09 RX ORDER — SODIUM CHLORIDE, SODIUM LACTATE, POTASSIUM CHLORIDE, CALCIUM CHLORIDE 600; 310; 30; 20 MG/100ML; MG/100ML; MG/100ML; MG/100ML
INJECTION, SOLUTION INTRAVENOUS CONTINUOUS
Status: DISCONTINUED | OUTPATIENT
Start: 2021-02-09 | End: 2021-02-09 | Stop reason: HOSPADM

## 2021-02-09 RX ORDER — FLUMAZENIL 0.1 MG/ML
0.2 INJECTION, SOLUTION INTRAVENOUS
Status: DISCONTINUED | OUTPATIENT
Start: 2021-02-09 | End: 2021-02-09 | Stop reason: HOSPADM

## 2021-02-09 RX ORDER — LIDOCAINE HYDROCHLORIDE 20 MG/ML
INJECTION, SOLUTION INFILTRATION; PERINEURAL PRN
Status: DISCONTINUED | OUTPATIENT
Start: 2021-02-09 | End: 2021-02-09

## 2021-02-09 RX ORDER — NALOXONE HYDROCHLORIDE 0.4 MG/ML
0.2 INJECTION, SOLUTION INTRAMUSCULAR; INTRAVENOUS; SUBCUTANEOUS
Status: DISCONTINUED | OUTPATIENT
Start: 2021-02-09 | End: 2021-02-09 | Stop reason: HOSPADM

## 2021-02-09 RX ORDER — PROPOFOL 10 MG/ML
INJECTION, EMULSION INTRAVENOUS CONTINUOUS PRN
Status: DISCONTINUED | OUTPATIENT
Start: 2021-02-09 | End: 2021-02-09

## 2021-02-09 RX ADMIN — PROPOFOL 160 MCG/KG/MIN: 10 INJECTION, EMULSION INTRAVENOUS at 12:20

## 2021-02-09 RX ADMIN — SODIUM CHLORIDE, POTASSIUM CHLORIDE, SODIUM LACTATE AND CALCIUM CHLORIDE 30 ML/HR: 600; 310; 30; 20 INJECTION, SOLUTION INTRAVENOUS at 11:04

## 2021-02-09 RX ADMIN — PROPOFOL 80 MG: 10 INJECTION, EMULSION INTRAVENOUS at 12:20

## 2021-02-09 RX ADMIN — LIDOCAINE HYDROCHLORIDE 0.1 ML: 10 INJECTION, SOLUTION EPIDURAL; INFILTRATION; INTRACAUDAL; PERINEURAL at 11:04

## 2021-02-09 RX ADMIN — LIDOCAINE HYDROCHLORIDE 40 MG: 20 INJECTION, SOLUTION INFILTRATION; PERINEURAL at 12:20

## 2021-02-09 ASSESSMENT — MIFFLIN-ST. JEOR: SCORE: 1597.51

## 2021-02-09 ASSESSMENT — LIFESTYLE VARIABLES: TOBACCO_USE: 1

## 2021-02-09 NOTE — INTERVAL H&P NOTE
I saw and examined Yanna Ordonez.  I have reviewed the history and physical and find no changes to the patient's medical status or condition with the exceptions noted below.       Damien Ferguson MD   11:15 AM 2/9/2021

## 2021-02-09 NOTE — ANESTHESIA POSTPROCEDURE EVALUATION
Patient: Yanna Ordonez    Procedure(s):  ESOPHAGOGASTRODUODENOSCOPY, WITH BRAVO PH MONITORING DEVICE INSERTION with biopsy    Diagnosis:GERD (gastroesophageal reflux disease) [K21.9]  Diagnosis Additional Information: No value filed.    Anesthesia Type:  MAC    Note:  Disposition: Outpatient   Postop Pain Control: Uneventful            Sign Out: Well controlled pain   PONV: No   Neuro/Psych: Uneventful            Sign Out: Acceptable/Baseline neuro status   Airway/Respiratory: Uneventful            Sign Out: Acceptable/Baseline resp. status   CV/Hemodynamics: Uneventful            Sign Out: Acceptable CV status   Other NRE: NONE   DID A NON-ROUTINE EVENT OCCUR? No         Last vitals:  Vitals:    02/09/21 1054   BP: (!) 140/90   Resp: 12   Temp: 98.2  F (36.8  C)   SpO2: 98%       Last vitals prior to Anesthesia Care Transfer:  CRNA VITALS  2/9/2021 1210 - 2/9/2021 1252      2/9/2021             NIBP:  107/65    NIBP Mean:  80    Ht Rate:  77    Resp Rate (set):  10          Electronically Signed By: ANGEL CARROLL CRNA  February 9, 2021  12:52 PM

## 2021-02-09 NOTE — DISCHARGE INSTRUCTIONS
[facility]  Adult Discharge Orders & Instructions    ________________________________________________________________          For 12 hours after surgery  1. Get plenty of rest.  A responsible adult must stay with you for at least 12 hours after you leave the hospital.   2. You may feel lightheaded.  IF so, sit for a few minutes before standing.  Have someone help you get up.   3. You may have a slight fever. Call the doctor if your fever is over 101 F (38.3 C) (taken under the tongue) or lasts longer than 24 hours.  4. You may have a dry mouth, a sore throat, muscle aches or trouble sleeping.  These should go away after 24 hours.  5. Do not make important or legal decisions.  6.   Do not drive or use heavy equipment.  If you have weakness or tingling, don't drive or use heavy equipment until this feeling goes away.    To contact a doctor, call   [phone]_______________________      You were given instruction today on the BRAVO recording device and how to enter your Symptoms via the recorder. You were also given a 2 page instruction packet that includes a written diary. You will need to bring back the written diary and the BRAVO recording device to Day Surgery after 48 hours, which would be after 2/11/21 at 12:30.

## 2021-02-09 NOTE — OP NOTE
PROCEDURE NOTE    SURGEON:Damien Ferguson MD    PRE-OP DIAGNOSIS:   GERD      POST-OP DIAGNOSIS: Hiatal hernia, esophagitis, GERD    PROCEDURE PLANNED:   Diagnostic EGD, flexible. Bravo pH probe placement        PROCEDURE PERFORMED:  Diagnostic EGD, flexible. Bravo pH probe placement    SPECIMEN:      ID Type Source Tests Collected by Time Destination   A : ge junction bx's Biopsy Gastro Esophageal Junction SURGICAL PATHOLOGY EXAM Damien Ferguson MD 2/9/2021 12:27 PM           ANESTHESIA: Monitor Anesthesia Care   CRNA Independent: Jaylin Brooks APRN CRNA      ESTIMATED BLOOD LOSS: None    COMPLICATIONS:  None    INDICATION FOR THE PROCEDURE:The patient is a 45 year oldfemale. The patient presents with intractable GERD. I explained to the patient the risks, benefits and alternatives to diagnostic EGD  with bravo pH capsule placement for evaluating GERD. We specifically discussed the risks of bleeding, infection, perforation and the risks of sedation. The patient's questions were answered and the patient wished to proceed. Informed consent paperwork was completed.    PROCEDURE: The patient was taken to the endoscopy suite. Appropriate monitors were attached. The patient was placed in the left lateral decubitus position. Bite block was positioned. Timeout was performed confirming the patient's identity and procedure to be performed. After appropriate sedation was confirmed, the flexible endoscope was advanced into the oropharynx. The posterior oropharynx appeared grossly normal. The scope was advanced into the proximal esophagus. The esophagus was insufflated with air. The scope was advanced under direct visualization. No acute abnormalities of the esophagus were noted. The scope was advanced into the stomach. The scope was advanced through the pylorus into the duodenal bulb. The bulb and distal duodenum appeared grossly normal.  The scope was withdrawn back into the stomach. Antrum and body of the  stomach appeared normal. The scope was retroflexed and the GE junction inspected. Moderate-sized hiatal hernia noted. The scope was returned to a neutral position and the stomach was decompressed. The scope was withdrawn to the GE junction and biopsy obtained.  The hiatus measured 3cm. Hiatal hernia length measured 5 cm.  The Z -line measured 35cm. There was LA Grade C change. The mucosa of the esophagus was inspected while withdrawing the scope. No abnormalities were noted. The scope was withdrawn from the patient.  The Bravo catheter was advanced to 29 cm. Suction was held for at least 30 seconds without interruption. The probe was deployed. The scope was re-advanced and the capsule verified.  The bite block was removed.The bite block was removed. The patient tolerated the procedure with no immediately apparent complication. The patient was taken to recovery instable condition.    FOLLOW UP:  RECOMMENDATIONS  Follow-up pathology and bravo results.

## 2021-02-09 NOTE — ANESTHESIA PREPROCEDURE EVALUATION
Anesthesia Evaluation     . Pt has had prior anesthetic. Type: MAC and General.    No history of anesthetic complications          ROS/MED HX    ENT/Pulmonary:     (+) tobacco use, Past use,     Neurologic:  - neg neurologic ROS     Cardiovascular:  - neg cardiovascular ROS       METS/Exercise Tolerance:  >4 METS   Hematologic:  - neg hematologic  ROS       Musculoskeletal:  - neg musculoskeletal ROS       GI/Hepatic:     (+) GERD, Asymptomatic on medication, hiatal hernia,       Renal/Genitourinary:  - neg Renal ROS       Endo:  - neg endo ROS       Psychiatric:  - neg psychiatric ROS    (-) chronic opioid use history   Infectious Disease:  - neg infectious disease ROS       Malignancy:       Other:    (+) , no H/O Chronic Pain,  - neg other ROS                 Physical Exam  Normal systems: cardiovascular, pulmonary and dental    Airway   Mallampati: II  TM distance: > 3 FB  Neck ROM: full  Mouth opening: > 3 cm    Dental     Cardiovascular   Rhythm and rate: regular and normal      Pulmonary    breath sounds clear to auscultation                    Anesthesia Plan     ASA Status:  2       NPO Status: > 6 hours  Anesthesia Plan Type Discussed: MAC (Pt requests GA vs. Spinal)  Induction Technique/Agent: Propofol      Postoperative Care      Consents  Anesthetic plan, risks, benefits and alternatives discussed with:  Patient.  Use of blood products discussed: Yes. Use of blood products discussed: Yes.     - Discussed with: Patient.     - Consented: consented to blood products       - Consented: consented to blood products  .                          .    Anesthesia Evaluation   Pt has had prior anesthetic. Type: MAC and General.    No history of anesthetic complications       ROS/MED HX  ENT/Pulmonary:     (+) tobacco use, Past use,     Neurologic:  - neg neurologic ROS     Cardiovascular:  - neg cardiovascular ROS     METS/Exercise Tolerance: >4 METS    Hematologic:  - neg hematologic  ROS     Musculoskeletal:  -  neg musculoskeletal ROS     GI/Hepatic:     (+) GERD, Asymptomatic on medication, hiatal hernia,     Renal/Genitourinary:  - neg Renal ROS     Endo:  - neg endo ROS     Psychiatric/Substance Use:  - neg psychiatric ROS  (-) chronic opioid use history   Infectious Disease:  - neg infectious disease ROS     Malignancy:  - neg malignancy ROS     Other:  - neg other ROS    (+) , no H/O Chronic Pain, (-) Any chance pregnant and Other Significant Disability         Physical Exam    Airway        Mallampati: II   TM distance: > 3 FB   Neck ROM: full   Mouth opening: > 3 cm    Respiratory Devices and Support         Dental  no notable dental history         Cardiovascular   cardiovascular exam normal       Rhythm and rate: regular and normal     Pulmonary   pulmonary exam normal        breath sounds clear to auscultation             Anesthesia Plan    ASA Status:  2      Anesthesia Type: MAC (Pt requests GA vs. Spinal).   Induction: Propofol.           Consents    Anesthesia Plan(s) and associated risks, benefits, and realistic alternatives discussed. Questions answered and patient/representative(s) expressed understanding.     - Discussed with:  Patient      - Extended Intubation/Ventilatory Support Discussed: no Extended Intubation.      - Patient is DNR/DNI Status: No    Use of blood products discussed: Yes.     - Discussed with: Patient.     - Consented: consented to blood products     Postoperative Care            Comments:

## 2021-02-22 DIAGNOSIS — K20.90 ESOPHAGITIS: Primary | ICD-10-CM

## 2021-02-22 DIAGNOSIS — K21.00 GASTROESOPHAGEAL REFLUX DISEASE WITH ESOPHAGITIS WITHOUT HEMORRHAGE: ICD-10-CM

## 2021-02-26 ENCOUNTER — OFFICE VISIT (OUTPATIENT)
Dept: SURGERY | Facility: OTHER | Age: 46
End: 2021-02-26
Attending: SURGERY
Payer: COMMERCIAL

## 2021-02-26 VITALS
RESPIRATION RATE: 16 BRPM | HEART RATE: 68 BPM | TEMPERATURE: 98.2 F | DIASTOLIC BLOOD PRESSURE: 82 MMHG | WEIGHT: 216 LBS | SYSTOLIC BLOOD PRESSURE: 148 MMHG | BODY MASS INDEX: 35.94 KG/M2

## 2021-02-26 DIAGNOSIS — E66.01 MORBID OBESITY (H): ICD-10-CM

## 2021-02-26 DIAGNOSIS — K21.00 GASTROESOPHAGEAL REFLUX DISEASE WITH ESOPHAGITIS WITHOUT HEMORRHAGE: Primary | ICD-10-CM

## 2021-02-26 PROCEDURE — 99214 OFFICE O/P EST MOD 30 MIN: CPT | Performed by: SURGERY

## 2021-02-26 ASSESSMENT — PAIN SCALES - GENERAL: PAINLEVEL: NO PAIN (0)

## 2021-02-26 NOTE — NURSING NOTE
"Chief Complaint   Patient presents with     RECHECK     discuss surgical options for reflux       Initial BP (!) 148/82 (BP Location: Right arm, Patient Position: Sitting, Cuff Size: Adult Regular)   Pulse 68   Temp 98.2  F (36.8  C) (Tympanic)   Resp 16   Wt 98 kg (216 lb)   LMP 06/02/2018   Breastfeeding No   BMI 35.94 kg/m   Estimated body mass index is 35.94 kg/m  as calculated from the following:    Height as of 2/9/21: 1.651 m (5' 5\").    Weight as of this encounter: 98 kg (216 lb).  Medication Reconciliation: Completed     Laura Vyas LPN  "

## 2021-02-26 NOTE — PROGRESS NOTES
Yanna Ordonez presents to clinic to follow-up after undergoing EGD with Bravo.  Patient states her reflux symptoms are not changed.  She currently takes Protonix 40 mg daily famotidine 20 mg twice daily.  She states she still gets considerable symptoms.  Recently, Bravo pH probe revealed a DeMeester score of 33.7.  Patient is interested in antireflux surgery.      BP (!) 148/82 (BP Location: Right arm, Patient Position: Sitting, Cuff Size: Adult Regular)   Pulse 68   Temp 98.2  F (36.8  C) (Tympanic)   Resp 16   Wt 98 kg (216 lb)   LMP 06/02/2018   Breastfeeding No   BMI 35.94 kg/m      Patient is sitting up in a chair, appears comfortable  No increased work of breathing, lungs are clear to auscultation bilaterally  Heart is regular rate and rhythm, no murmur  Abdomen is soft, nontender, nondistended  Normal bulk and tone, no lower extremity edema  Alert and oriented, normal speech mentation      Biopsies from recent EGD show mild esophagitis with no evidence of Bettencourt's.        Yanna Ordonez is a 45-year-old female with hernia and GERD.  Patient seems to be an excellent candidate for antireflux surgery.  She has failed maximal medical therapy and has demonstrable reflux with abnormal Bravo pH test, again, DeMeester score of 33.7.  The different types of laparoscopic fundoplication were discussed with the patient and I believe she would most likely be a good candidate for a Nissen fundoplication.  She will need to undergo esophageal function testing in the form of esophageal manometry in order to ensure that her esophagus functions normally to push fluids through a full 360 degree wrap.  Patient is willing to undergo this testing in order to proceed to surgery.  Patient is an excellent candidate for reflux surgery, only risk factor is obesity.  She has no complicating medical comorbidities that would qualify her for gastric bypass.  The technical details of laparoscopic lateral hernia repair and  Nissen fundoplication were discussed with the patient along with the risk and benefits include bleeding, infection, recurrent GERD, recurrent hiatal hernia, slipped Nissen wrap, gas bloat, injury surrounding structures including spleen, liver, pancreas, duodenum,  colon, small intestine and omentum.  The patient demonstrates understanding and is willing to proceed with surgery once we have ensured her esophagus functions normally.    Schedule esophageal manometry  Plan for surgery after manometry testing is done  Schedule visit with primary care provider to discuss hypertension and high blood glucose measurements      Damien Ferguson MD

## 2021-03-15 ENCOUNTER — OFFICE VISIT (OUTPATIENT)
Dept: FAMILY MEDICINE | Facility: OTHER | Age: 46
End: 2021-03-15
Attending: FAMILY MEDICINE
Payer: COMMERCIAL

## 2021-03-15 ENCOUNTER — ALLIED HEALTH/NURSE VISIT (OUTPATIENT)
Dept: FAMILY MEDICINE | Facility: OTHER | Age: 46
End: 2021-03-15
Attending: SURGERY
Payer: COMMERCIAL

## 2021-03-15 VITALS
WEIGHT: 216.2 LBS | TEMPERATURE: 98.6 F | HEIGHT: 65 IN | HEART RATE: 83 BPM | DIASTOLIC BLOOD PRESSURE: 80 MMHG | SYSTOLIC BLOOD PRESSURE: 136 MMHG | BODY MASS INDEX: 36.02 KG/M2 | RESPIRATION RATE: 16 BRPM

## 2021-03-15 DIAGNOSIS — K21.00 GASTROESOPHAGEAL REFLUX DISEASE WITH ESOPHAGITIS WITHOUT HEMORRHAGE: ICD-10-CM

## 2021-03-15 DIAGNOSIS — I10 ESSENTIAL HYPERTENSION: ICD-10-CM

## 2021-03-15 DIAGNOSIS — R73.01 IFG (IMPAIRED FASTING GLUCOSE): ICD-10-CM

## 2021-03-15 DIAGNOSIS — Z01.818 PREOP GENERAL PHYSICAL EXAM: Primary | ICD-10-CM

## 2021-03-15 DIAGNOSIS — R06.83 SNORING: ICD-10-CM

## 2021-03-15 DIAGNOSIS — E66.01 MORBID OBESITY (H): ICD-10-CM

## 2021-03-15 DIAGNOSIS — Z20.822 COVID-19 RULED OUT: Primary | ICD-10-CM

## 2021-03-15 LAB
ALBUMIN UR-MCNC: NEGATIVE MG/DL
APPEARANCE UR: CLEAR
BILIRUB UR QL STRIP: NEGATIVE
COLOR UR AUTO: YELLOW
GLUCOSE UR STRIP-MCNC: NEGATIVE MG/DL
HBA1C MFR BLD: 5.5 % (ref 4–6)
HGB UR QL STRIP: NEGATIVE
KETONES UR STRIP-MCNC: NEGATIVE MG/DL
LEUKOCYTE ESTERASE UR QL STRIP: NEGATIVE
NITRATE UR QL: NEGATIVE
PH UR STRIP: 6 PH (ref 5–7)
SARS-COV-2 RNA RESP QL NAA+PROBE: NORMAL
SOURCE: NORMAL
SP GR UR STRIP: 1.01 (ref 1–1.03)
SPECIMEN SOURCE: NORMAL
UROBILINOGEN UR STRIP-MCNC: NORMAL MG/DL (ref 0–2)

## 2021-03-15 PROCEDURE — U0003 INFECTIOUS AGENT DETECTION BY NUCLEIC ACID (DNA OR RNA); SEVERE ACUTE RESPIRATORY SYNDROME CORONAVIRUS 2 (SARS-COV-2) (CORONAVIRUS DISEASE [COVID-19]), AMPLIFIED PROBE TECHNIQUE, MAKING USE OF HIGH THROUGHPUT TECHNOLOGIES AS DESCRIBED BY CMS-2020-01-R: HCPCS | Mod: ZL | Performed by: SURGERY

## 2021-03-15 PROCEDURE — 83036 HEMOGLOBIN GLYCOSYLATED A1C: CPT | Mod: ZL | Performed by: FAMILY MEDICINE

## 2021-03-15 PROCEDURE — 93000 ELECTROCARDIOGRAM COMPLETE: CPT | Performed by: INTERNAL MEDICINE

## 2021-03-15 PROCEDURE — U0005 INFEC AGEN DETEC AMPLI PROBE: HCPCS | Mod: ZL | Performed by: SURGERY

## 2021-03-15 PROCEDURE — 36415 COLL VENOUS BLD VENIPUNCTURE: CPT | Mod: ZL | Performed by: FAMILY MEDICINE

## 2021-03-15 PROCEDURE — 99215 OFFICE O/P EST HI 40 MIN: CPT | Performed by: FAMILY MEDICINE

## 2021-03-15 PROCEDURE — C9803 HOPD COVID-19 SPEC COLLECT: HCPCS

## 2021-03-15 PROCEDURE — 81003 URINALYSIS AUTO W/O SCOPE: CPT | Mod: ZL | Performed by: FAMILY MEDICINE

## 2021-03-15 RX ORDER — LISINOPRIL 10 MG/1
10 TABLET ORAL DAILY
Qty: 30 TABLET | Refills: 0 | Status: SHIPPED | OUTPATIENT
Start: 2021-03-15 | End: 2021-03-22

## 2021-03-15 ASSESSMENT — MIFFLIN-ST. JEOR: SCORE: 1616.56

## 2021-03-15 ASSESSMENT — PAIN SCALES - GENERAL: PAINLEVEL: NO PAIN (0)

## 2021-03-15 NOTE — PATIENT INSTRUCTIONS
Preparing for Your Surgery  Getting started  A nurse will call you to review your health history and instructions. They will give you an arrival time based on your scheduled surgery time.  Please be ready to share the following:    Your doctor's clinic name and phone number    Your medical, surgical and anesthesia history    A list of allergies and sensitivities    A list of medicines, including herbal treatments and over-the-counter drugs    Whether the patient has a legal guardian (ask how to send us the papers in advance)  If you have a child who's having surgery, please ask for a copy of Preparing for Your Child's Surgery.    Preparing for surgery    Within 30 days of surgery: Have a pre-op exam (sometimes called an H&P, or History and Physical). This can be done at a clinic or pre-operative center.  ? If you're having a , you may not need this exam. Talk to your care team    At your pre-op exam, talk to your care team about all medicines you take. If you need to stop any medicines before surgery, ask when to start taking them again.  ? We do this for your safety. Many medicines can make you bleed too much during surgery. Some change how well surgery (anesthesia) drugs work.    Call your insurance company to let them know you're having surgery. (If you don't have insurance, call [phone].)    Call your clinic if there's any change in your health. This includes signs of a cold or flu (sore throat, runny nose, cough, rash, fever). It also includes a scrape or scratch near the surgery site.    If you have questions on the day of surgery, call your hospital or surgery center.  Eating and drinking guidelines  For your safety: Unless your surgeon tells you otherwise, follow the guidelines below.    Eat and drink as usual until 8 hours before surgery. After that, no food or milk.    Drink clear liquids until 2 hours before surgery. These are liquids you can see through, like water, Gatorade and Propel  Water. You may also have black coffee and tea (no cream or milk).    Nothing by mouth within 2 hours of surgery. This includes gum, candy and breath mints.    If you drink, stop drinking alcohol the night before surgery.    If your care team tells you to take medicine on the morning of surgery, it's okay to take it with a sip of water.  Preventing infection    Shower or bathe the night before and morning of your surgery. Follow the instructions your clinic gave you. (If no instructions, use regular soap.)    Don't shave or clip hair near your surgery site. We'll remove the hair if needed.    Don't smoke or vape the morning of surgery. You may chew nicotine gum up to 2 hours before surgery. A nicotine patch is okay.  ? Note: Some surgeries require you to completely quit smoking and nicotine. Check with your surgeon.    Your care team will make every effort to keep you safe from infection. We will:  ? Clean our hands often with soap and water (or an alcohol-based hand rub).  ? Clean the skin at your surgery site with a special soap that kills germs.  ? Give you a special gown to keep you warm. (Cold raises the risk of infection.)  ? Wear special hair covers, masks, gowns and gloves during surgery.  ? Give antibiotic medicine, if prescribed. Not all surgeries need antibiotics.  What to bring on the day of surgery    Photo ID and insurance card    Copy of your health care directive, if you have one    Glasses and hearing aides (bring cases)  ? You can't wear contacts during surgery    Inhaler and eye drops, if you use them (tell us about these when you arrive)    CPAP machine or breathing device, if you use them    A few personal items, if spending the night    If you have . . .  ? A pacemaker or ICD (cardiac defibrillator): Bring the ID card.  ? An implanted stimulator: Bring the remote control.  ? A legal guardian: Bring a copy of the certified (court-stamped) guardianship papers.  Please remove any jewelry, including  body piercings. Leave jewelry and other valuables at home.  If you're going home the day of surgery  Important: If you don't follow the rules below, we must cancel your surgery.     Arrange for someone to drive you home after surgery. You may not drive, take a taxi or take public transportation by yourself (unless you'll have local anesthesia only).    Arrange for a responsible adult to stay with you overnight. If you don't, we may keep you in the hospital overnight, and you may need to pay the costs yourself.  Questions?   If you have any questions for your care team, list them here: _________________________________________________________________________________________________________________________________________________________________________________________________________________________________________________________________________________________________________________________  For informational purposes only. Not to replace the advice of your health care provider. Copyright   2003, 2019 [employer]. All rights reserved. Clinically reviewed by Lawanda Gonzalez MD. SMARTworks 771637 - REV 4/20.    Patient Education     Understanding USDA MyPlate  The USDA has guidelines to help you make healthy food choices. These are called MyPlate. MyPlate shows the food groups that make up healthy meals using the image of a place setting. Before you eat, think about the healthiest choices for what to put on your plate or in your cup or bowl. To learn more about building a healthy plate, visit www.choosemyplate.gov.     The food groups    Fruits. Any fruit or 100% fruit juice counts as part of the Fruit Group. Fruits may be fresh, canned, frozen, or dried, and may be whole, cut-up, or pureed. Make 1/2 of your plate fruits and vegetables.    Vegetables. Any vegetable or 100% vegetable juice counts as a member of the Vegetable Group. Vegetables may be fresh, frozen, canned, or dried. They can be served raw or  cooked and may be whole, cut-up, or mashed. Make 1/2 of your plate fruits and vegetables.    Grains. All foods made from grains are part of the Grains Group. These include wheat, rice, oats, cornmeal, and barley. Grains are often used to make foods such as bread, pasta, oatmeal, cereal, tortillas, and grits. Grains should be no more than 1/4 of your plate. At least half of your grains should be whole grains.    Protein. This group includes meat, poultry, seafood, beans and peas, eggs, processed soy products (such as tofu), nuts (including nut butters), and seeds. Make protein choices no more than 1/4 of your plate. Meat and poultry choices should be lean or low fat.    Dairy. The Dairy Group includes all fluid milk products and foods made from milk that contain calcium, such as yogurt and cheese. (Foods that have little calcium, such as cream, butter, and cream cheese, are not part of this group.) Most dairy choices should be low-fat or fat-free.    Oils. Oils aren't a food group, but they do contain essential nutrients. However it's important to watch your intake of oils. These are fats that are liquid at room temperature. They include canola, corn, olive, soybean, vegetable, and sunflower oil. Foods that are mainly oil include mayonnaise, certain salad dressings, and soft margarines. You likely already get your daily oil allowance from the foods you eat.  Things to limit  Eating healthy also means limiting these things in your diet:    Salt (sodium). Many processed foods have a lot of sodium. To keep sodium intake down, eat fresh vegetables, meats, poultry, and seafood when possible. Purchase low-sodium, reduced-sodium, or no-salt-added food products at the store. And don't add salt to your meals at home. Instead, season them with herbs and spices such as dill, oregano, cumin, and paprika. Or try adding flavor with lemon or lime zest and juice.    Saturated fat. Saturated fats are most often found in animal  products such as beef, pork, and chicken. They are often solid at room temperature, such as butter. To reduce your saturated fat intake, choose leaner cuts of meat and poultry. And try healthier cooking methods such as grilling, broiling, roasting, or baking. For a simple lower-fat swap, use plain nonfat yogurt instead of mayonnaise when making potato salad or macaroni salad.    Added sugars. These are sugars added to foods. They are in foods such as ice cream, candy, soda, fruit drinks, sports drinks, energy drinks, cookies, pastries, jams, and syrups. Cut down on added sugars by sharing sweet treats with a family member or friend. You can also choose fruit for dessert, and drink water or other unsweetened beverages.  [employer] last reviewed this educational content on 6/1/2020 2000-2020 [employer]. All rights reserved. This information is not intended as a substitute for professional medical care. Always follow your healthcare professional's instructions.

## 2021-03-15 NOTE — NURSING NOTE
Chief Complaint   Patient presents with     Pre-Op Exam         Medication Reconciliation: complete    Radha Cabrera, LPN

## 2021-03-15 NOTE — PROGRESS NOTES
[facility] [place]  [address]  [place] [address]  Phone: [phone]  Fax: [phone]  Primary Provider: Antonieta Mckeon  Pre-op Performing Provider: KARY CLINTON    PREOPERATIVE EVALUATION:  Today's date: 3/15/2021    Yanna Ordonez is a 45 year old female who presents for a preoperative evaluation.    Surgical Information:  Surgery/Procedure: Motility Study  Surgery Location: [facility]  Surgeon: Cindy  Surgery Date: 3/19/2021  Time of Surgery: unknown  Where patient plans to recover: At home with family  Fax number for surgical facility: [phone]    Type of Anesthesia Anticipated: to be determined    Assessment & Plan     The proposed surgical procedure is considered LOW risk.    (Z01.818) Preop general physical exam  (primary encounter diagnosis)  Comment: Clear for procedure w/o further work up.  Plan: EKG 12-lead complete w/read - Clinics- Normal EKG    (K21.00) Gastroesophageal reflux disease with esophagitis without hemorrhage  Plan: Proceed with motility study    (I10) Essential hypertension  Comment: New diagnosis for SBPs consistently in 130s. No need to postpone procedure.  Plan: lisinopril (ZESTRIL) 10 MG tablet, Home Blood         Pressure Monitor Order for DME - ONLY FOR DME        -RTC in 1 week to check home BP log        -Counseling provided regarding new medication potential SE    (E66.01) Morbid obesity (H)  Comment: Pt motivated to make healthy changes  Plan: NUTRITION REFERRAL       -RTC in 1 month to follow up    (R73.01) IFG (impaired fasting glucose)  Comment: hx of IFG + obesity   Plan: Hemoglobin A1c, UA reflex to Microscopic and         Culture    (R06.83) Snoring  Comment: no hx of DARRON, STOP-Band score of 4  Plan: consider sleep study in the future    Possible Sleep Apnea:{Provider consider completing risk assessment  Link to STOP-Bang Flowsheet :  STOP-Bang Total Score 3/16/2021   Total Score 4   Risk Stratification 3 - 4:  Moderate Risk for DARRON     Risks and Recommendations:  The patient has the following additional risks and recommendations for perioperative complications:   - No identified additional risk factors other than previously addressed  - New dx of HTN    Medication Instructions:  Patient is to take no medications on the day of surgery.    RECOMMENDATION:  APPROVAL GIVEN to proceed with proposed procedure, without further diagnostic evaluation.    Review of external notes as documented above     A total of 50 minutes were spent on this encounter, including prep time, visit time with the patient, and post visit work including documentation time.    {Provider  Link to [facility] Help Grid     Subjective     HPI related to upcoming procedure:   Patient has had reflux symptoms.  She underwent an EGD with Bravo.  And her reflux symptoms did not change.  She currently takes Protonix 40 mg daily and famotidine 20 mg twice daily.  She still has considerable symptoms.  Patient is interested in antireflux surgery.  Recent EGD biopsy showed mild esophagitis just without evidence of Bettencourt's.    Hx vag hyst in 8/2018. One ovary left. Mild new menopause symptoms.     Preop Questions 3/15/2021   1. Have you ever had a heart attack or stroke? No   2. Have you ever had surgery on your heart or blood vessels, such as a stent placement, a coronary artery bypass, or surgery on an artery in your head, neck, heart, or legs? No   3. Do you have chest pain with activity? No   4. Do you have a history of  heart failure? No   5. Do you currently have a cold, bronchitis or symptoms of other infection? No   6. Do you have a cough, shortness of breath, or wheezing? No   7. Do you or anyone in your family have previous history of blood clots? YES - brother with unprovoked blood clots. Personal hx of SAB.   8. Do you or does anyone in your family have a serious bleeding problem such as prolonged bleeding following surgeries or cuts? No   9. Have you ever had  "problems with anemia or been told to take iron pills? YES - when pregnant.    10. Have you had any abnormal blood loss such as black, tarry or bloody stools, or abnormal vaginal bleeding? No   11. Have you ever had a blood transfusion? No   12. Are you willing to have a blood transfusion if it is medically needed before, during, or after your surgery? Yes   13. Have you or any of your relatives ever had problems with anesthesia? No   14. Do you have sleep apnea, excessive snoring or daytime drowsiness? YES - \"annoying breathing\" per . Day time sleepiness.    14a. Do you have a CPAP machine? No   15. Do you have any artifical heart valves or other implanted medical devices like a pacemaker, defibrillator, or continuous glucose monitor? No   16. Do you have artificial joints? No   17. Are you allergic to latex? No   18. Is there any chance that you may be pregnant? No     Health Care Directive:  Patient does not have a Health Care Directive or Living Will: Discussed advance care planning with patient; information given to patient to review.    Preoperative Review of :   reviewed - no record of controlled substances prescribed.    Status of Chronic Conditions:  See problem list for active medical problems.  Problems all longstanding and stable, except as noted/documented.  See ROS for pertinent symptoms related to these conditions.    Review of Systems  Has noticed changes in her urine recent. Snoring, per her . Constitutional, neuro, ENT, endocrine, pulmonary, cardiac, gastrointestinal, genitourinary, musculoskeletal, integument and psychiatric systems are negative, except as otherwise noted.    Patient Active Problem List    Diagnosis Date Noted     Snoring 03/15/2021     Priority: Medium     Essential hypertension 03/15/2021     Priority: Medium     IFG (impaired fasting glucose) 03/15/2021     Priority: Medium     Morbid obesity (H) 02/26/2021     Priority: Medium     Recurrent periodic " urticaria 2021     Priority: Medium     Arthritis of finger of both hands 2021     Priority: Medium     Gastroesophageal reflux disease with esophagitis without hemorrhage 2021     Priority: Medium     Dyshidrotic eczema 2021     Priority: Medium     History of 2019 novel coronavirus disease (COVID-19) 2021     Priority: Medium     S/P hysterectomy 2018     Priority: Medium     Excessive or frequent menstruation 2018     Priority: Medium     Lightheadedness 2018     Priority: Medium     Hiatal hernia 2018     Priority: Medium     Neck pain, chronic 2018     Priority: Medium     Carpal tunnel syndrome, bilateral 10/10/2016     Priority: Medium      Past Medical History:   Diagnosis Date     Personal history of other medical treatment (CODE)     Childbirth     Past Surgical History:   Procedure Laterality Date     DILATION AND CURETTAGE      ,after SAB     HYSTERECTOMY VAGINAL N/A 2018    Procedure: HYSTERECTOMY VAGINAL;  Total Vaginal Hysterectomy and Bilateral Salpingectomy, Left oophorectomy;  Surgeon: Demarco Verdugo MD;  Location:  OR     Current Outpatient Medications   Medication Sig Dispense Refill     famotidine (PEPCID) 20 MG tablet Take 1 tablet (20 mg) by mouth 2 times daily 180 tablet 3     lisinopril (ZESTRIL) 10 MG tablet Take 1 tablet (10 mg) by mouth daily 30 tablet 0     pantoprazole (PROTONIX) 40 MG EC tablet Take 1 tablet by mouth once daily. 90 tablet 3     triamcinolone (KENALOG) 0.1 % external cream Apply topically 2 times daily as needed for irritation 45 g 11     No Known Allergies     Social History     Tobacco Use     Smoking status: Former Smoker     Packs/day: 0.50     Types: Cigarettes     Quit date: 10/1/2015     Years since quittin.4     Smokeless tobacco: Never Used     Tobacco comment: Quit smoking: Had quit for 6 years and started again about a month ago   Substance Use Topics     Alcohol use: No      "Alcohol/week: 1.7 standard drinks     Family History   Problem Relation Age of Onset     Other - See Comments Mother 65        Celiac disease     Other - See Comments Father         Glaucoma     Family History Negative Sister         Good Health     Family History Negative Sister         Good Health     Family History Negative Brother         Good Health     Family History Negative Brother         Good Health,Twin     Family History Negative Brother         Good Health,Twin - Had GIB     Heart Disease No family hx of         Heart Disease,No known premature CAD     Diabetes No family hx of         Diabetes,DM2     Breast Cancer No family hx of         Cancer-breast     Colon Cancer No family hx of         Cancer-colon     History   Drug Use No         Objective     /80 (BP Location: Right arm, Patient Position: Sitting, Cuff Size: Adult Regular)   Pulse 83   Temp 98.6  F (37  C) (Tympanic)   Resp 16   Ht 1.651 m (5' 5\")   Wt 98.1 kg (216 lb 3.2 oz)   LMP 06/02/2018   Breastfeeding No   BMI 35.98 kg/m      Wt Readings from Last 4 Encounters:   03/15/21 98.1 kg (216 lb 3.2 oz)   02/26/21 98 kg (216 lb)   02/09/21 96.2 kg (212 lb)   01/14/21 99.1 kg (218 lb 6.4 oz)     BP Readings from Last 6 Encounters:   03/15/21 136/80   02/26/21 (!) 148/82   02/09/21 (!) 139/93   01/14/21 (!) 140/72   01/06/21 120/68   07/12/19 140/88     Last three years elevated on health dynamic physical.     Physical Exam    GENERAL APPEARANCE: healthy, alert and no distress     EYES: EOMI, PERRL     HENT: ear canals and TM's normal and nose and mouth without ulcers or lesions     NECK: no adenopathy, no asymmetry, masses, or scars and thyroid normal to palpation     RESP: lungs clear to auscultation - no rales, rhonchi or wheezes     CV: regular rates and rhythm, normal S1 S2, no S3 or S4 and no murmur, click or rub     ABDOMEN:  soft, nontender, no HSM or masses and bowel sounds normal     MS: extremities normal- no gross " deformities noted, no evidence of inflammation in joints, FROM in all extremities.     SKIN: no suspicious lesions or rashes     NEURO: Normal strength and tone, sensory exam grossly normal, mentation intact and speech normal     PSYCH: mentation appears normal. and affect normal/bright     LYMPHATICS: No cervical adenopathy    Recent Labs   Lab Test 02/04/21  0835      POTASSIUM 4.1   CR 0.84      Diagnostics:  Allied Health/Nurse Visit on 03/15/2021   Component Date Value Ref Range Status     COVID-19 Virus PCR to [facility] - So* 03/15/2021 Nasopharyngeal   Final     COVID-19 Virus PCR to [facility] - Re* 03/15/2021 Test received-See reflex to [facility] test SARS CoV2 (COVID-19) Virus RT-PCR   Final   Office Visit on 03/15/2021   Component Date Value Ref Range Status     Color Urine 03/15/2021 Yellow   Final     Appearance Urine 03/15/2021 Clear   Final     Glucose Urine 03/15/2021 Negative  NEG^Negative mg/dL Final     Bilirubin Urine 03/15/2021 Negative  NEG^Negative Final     Ketones Urine 03/15/2021 Negative  NEG^Negative mg/dL Final     Specific Gravity Urine 03/15/2021 1.007  1.003 - 1.035 Final     Blood Urine 03/15/2021 Negative  NEG^Negative Final     pH Urine 03/15/2021 6.0  5.0 - 7.0 pH Final     Protein Albumin Urine 03/15/2021 Negative  NEG^Negative mg/dL Final     Urobilinogen mg/dL 03/15/2021 Normal  0.0 - 2.0 mg/dL Final     Nitrite Urine 03/15/2021 Negative  NEG^Negative Final     Leukocyte Esterase Urine 03/15/2021 Negative  NEG^Negative Final     Source 03/15/2021 Midstream Urine   Final     Hemoglobin A1C 03/15/2021 5.5  4.0 - 6.0 % Final       EKG: appears normal, NSR, unchanged from previous tracings    Revised Cardiac Risk Index (RCRI):  The patient has the following serious cardiovascular risks for perioperative complications:   - No serious cardiac risks = 0 points     RCRI Interpretation: 0 points: Class I (very low risk - 0.4% complication rate)     Signed Electronically by: Scarlett  MD Danyel  Copy of this evaluation report is provided to requesting physician.

## 2021-03-16 LAB
INTERPRETATION ECG - MUSE: NORMAL
LABORATORY COMMENT REPORT: NORMAL
SARS-COV-2 RNA RESP QL NAA+PROBE: NEGATIVE
SPECIMEN SOURCE: NORMAL

## 2021-03-19 ENCOUNTER — HOSPITAL ENCOUNTER (OUTPATIENT)
Facility: OTHER | Age: 46
Discharge: HOME | End: 2021-03-19
Attending: SURGERY | Admitting: SURGERY
Payer: COMMERCIAL

## 2021-03-19 PROCEDURE — 91010 ESOPHAGUS MOTILITY STUDY: CPT | Performed by: SURGERY

## 2021-03-19 NOTE — OR NURSING
Yanna Ordonez is here for Manometry testing. After patient assessment of symptoms was completed,the procedure was explained and the patient consented to proceed. A High Resolution Manometry catheter was inserted via the left nare to 50 cm. Pressure band of both UES and LES were observed on the color contour. Patient instructed to take a deep breath to verify placement of catheter; diaphramatic pinch noted on inspiration. Patient was assisted to supine position and catheter was stabilized by taping the nares. Patient was encouraged to relax while acclimating the catheter approximately 5 minutes. A 30 second baseline pressure was obtained to identify the UES and LES followed by a series of wet swallows using 5 mL's of room temperature saline to assess esophageal motility and bolus transit. At conclusion of the procedure; the catheter was removed.  Patient discharged home. Suri Serrano RN on 3/19/2021 at 9:38 AM

## 2021-03-22 ENCOUNTER — OFFICE VISIT (OUTPATIENT)
Dept: FAMILY MEDICINE | Facility: OTHER | Age: 46
End: 2021-03-22
Attending: FAMILY MEDICINE
Payer: COMMERCIAL

## 2021-03-22 VITALS
HEART RATE: 110 BPM | RESPIRATION RATE: 16 BRPM | TEMPERATURE: 98.6 F | BODY MASS INDEX: 35.58 KG/M2 | OXYGEN SATURATION: 97 % | WEIGHT: 213.8 LBS | SYSTOLIC BLOOD PRESSURE: 134 MMHG | DIASTOLIC BLOOD PRESSURE: 90 MMHG

## 2021-03-22 DIAGNOSIS — I10 ESSENTIAL HYPERTENSION: Primary | ICD-10-CM

## 2021-03-22 PROCEDURE — 99213 OFFICE O/P EST LOW 20 MIN: CPT | Performed by: FAMILY MEDICINE

## 2021-03-22 RX ORDER — LISINOPRIL 10 MG/1
30 TABLET ORAL DAILY
Qty: 90 TABLET | Refills: 0 | Status: ON HOLD | OUTPATIENT
Start: 2021-03-22 | End: 2021-04-21

## 2021-03-22 ASSESSMENT — PAIN SCALES - GENERAL: PAINLEVEL: NO PAIN (0)

## 2021-03-22 NOTE — PROGRESS NOTES
Nursing Notes:   Radha Cabrera LPN  3/22/2021  1:47 PM  Sign at exiting of workspace  Chief Complaint   Patient presents with     RECHECK     BP     Here today for recheck of BP. Procedure completed on 3/19. No results yet.     Medication Reconciliation: complete    Radha Cabrera LPN         SUBJECTIVE:  HPI:Yanna Ordonez is a 45 year old female here for recheck of her essential hypertension which was a new diagnosis at her preop visit.  Patient has been compliant with 10 mg of lisinopril at night and home blood pressures have been in the 130s systolic and 80s diastolic.  She denies any cough, side effects, chest pain or shortness of breath.    Results pending from last week's procedure.    Allergies:  No Known Allergies    ROS:  Constitutional, HEENT, cardiovascular, pulmonary, GI and  systems are negative, except as otherwise noted.    Past medical, surgical, and family history reviewed and updated as appropriate in the chart.  Relevant social history listed in HPI.    OBJECTIVE:  BP (!) 134/90 (BP Location: Right arm, Patient Position: Sitting, Cuff Size: Adult Regular)   Pulse 110   Temp 98.6  F (37  C) (Tympanic)   Resp 16   Wt 97 kg (213 lb 12.8 oz)   LMP 06/02/2018   SpO2 97%   Breastfeeding No   BMI 35.58 kg/m      BP Readings from Last 6 Encounters:   03/22/21 (!) 134/90   03/15/21 136/80   02/26/21 (!) 148/82   02/09/21 (!) 139/93   01/14/21 (!) 140/72   01/06/21 120/68       EXAM:  Constitutional: No acute distress. Well-groomed, well-hydrated and well-nourished.  Appears stated age.  Head: Normocephalic, atraumatic.  Respiratory: Non-labored respirations.   Psychiatric: Does not appear anxious or depressed.  Appropriate affect and insight.    ASSESSMENT/PLAN:     1. Essential hypertension  - lisinopril (ZESTRIL) 10 MG tablet; Take 3 tablets (30 mg) by mouth daily  Dispense: 90 tablet;   Comment: Blood pressure still not well controlled with 10 mg of lisinopril  Plan: Increase dose  to lisinopril (ZESTRIL) 30 MG tablet        -RTC in 2 week to check home BP log    2. (R06.83) Snoring  Comment: no hx of DARRON, STOP-Band score of 4  Plan: consider sleep study in the future after results back    Scarlett Montaño MD  [facility]    Portions of this dictation were created using the Dragon Nuance voice recognition system. Proofreading was completed but there may be errors in text.

## 2021-03-22 NOTE — NURSING NOTE
Chief Complaint   Patient presents with     RECHECK     BP     Here today for recheck of BP. Procedure completed on 3/19. No results yet.     Medication Reconciliation: complete    Radha Cabrera LPN

## 2021-04-05 ENCOUNTER — OFFICE VISIT (OUTPATIENT)
Dept: FAMILY MEDICINE | Facility: OTHER | Age: 46
End: 2021-04-05
Attending: FAMILY MEDICINE
Payer: COMMERCIAL

## 2021-04-05 VITALS
SYSTOLIC BLOOD PRESSURE: 132 MMHG | WEIGHT: 220.2 LBS | OXYGEN SATURATION: 98 % | TEMPERATURE: 98.3 F | RESPIRATION RATE: 16 BRPM | HEART RATE: 89 BPM | BODY MASS INDEX: 36.64 KG/M2 | DIASTOLIC BLOOD PRESSURE: 80 MMHG

## 2021-04-05 DIAGNOSIS — M25.552 HIP PAIN, LEFT: ICD-10-CM

## 2021-04-05 DIAGNOSIS — E66.01 MORBID OBESITY (H): ICD-10-CM

## 2021-04-05 DIAGNOSIS — I10 ESSENTIAL HYPERTENSION: Primary | ICD-10-CM

## 2021-04-05 PROCEDURE — 99214 OFFICE O/P EST MOD 30 MIN: CPT | Performed by: FAMILY MEDICINE

## 2021-04-05 RX ORDER — AMLODIPINE BESYLATE 5 MG/1
5 TABLET ORAL DAILY
Qty: 30 TABLET | Refills: 0 | Status: SHIPPED | OUTPATIENT
Start: 2021-04-05 | End: 2021-05-05

## 2021-04-05 RX ORDER — LISINOPRIL 40 MG/1
40 TABLET ORAL DAILY
Qty: 30 TABLET | Refills: 0 | Status: SHIPPED | OUTPATIENT
Start: 2021-04-05 | End: 2021-05-05

## 2021-04-05 ASSESSMENT — PAIN SCALES - GENERAL: PAINLEVEL: MODERATE PAIN (5)

## 2021-04-05 NOTE — PROGRESS NOTES
Nursing Notes:   Radha Cabrera LPN  4/5/2021  4:39 PM  Signed  Chief Complaint   Patient presents with     RECHECK     BP     Here today for recheck of BP. Is having some intermittent L hip pain.     Medication Reconciliation: complete    Radha Cabrera LPN       SUBJECTIVE:  HPI: Yanna Ordonez is a 45 year old female here for recheck of her essential hypertension which was a new diagnosis at her preop visit, and additionally has been bothered by new left hip pain.    HTN:  Patient has been compliant with 30 mg of lisinopril at night and home blood pressures have been 130/80, without much variation. Denies SE such as cough lightheadedness or dizziness. She is willing to increase her dose and add a second agent for BP control.    L hip pain rated 5/10. New and worsening for the past few weeks. Comes and goes. Is achy. She has been applying tiger balm and biofreeze with improvement. Pain is worse with movement, better with rest. Worse when she stands and leans backwards. Denies radicular symptoms. No left leg numbness, tingling. No incontinence. Hx of pelvic fracture @ age 11. Does have arthritis in her hands. BMI 36.    Allergies:  No Known Allergies    ROS:  Snoring. Constitutional, HEENT, cardiovascular, pulmonary, GI and  systems are negative, except as otherwise noted.    Past medical, surgical, and family history reviewed and updated as appropriate in the chart.  Relevant social history listed in HPI.    OBJECTIVE:  /80 (BP Location: Right arm, Patient Position: Sitting, Cuff Size: Adult Regular)   Pulse 89   Temp 98.3  F (36.8  C) (Tympanic)   Resp 16   Wt 99.9 kg (220 lb 3.2 oz)   LMP 06/02/2018   SpO2 98%   Breastfeeding No   BMI 36.64 kg/m      BP Readings from Last 6 Encounters:   04/05/21 132/80   03/22/21 (!) 134/90   03/15/21 136/80   02/26/21 (!) 148/82   02/09/21 (!) 139/93   01/14/21 (!) 140/72     Wt Readings from Last 4 Encounters:   04/05/21 99.9 kg (220 lb 3.2 oz)    03/22/21 97 kg (213 lb 12.8 oz)   03/15/21 98.1 kg (216 lb 3.2 oz)   02/26/21 98 kg (216 lb)     EXAM:  CONSTITUTIONAL:  Alert, cooperative, NAD.  EYES: No scleral icterus. Conjunctiva clear.  CARDIOVASCULAR: Regular, S1, S2.  No S3 or S4.  No murmur/gallop/rub.  No peripheral edema.  RESPIRATORY: CTA bilaterally, no wheezes, rhonchi or rales.  GI: Bowel sounds wnl.  Soft, nontender, nondistended.  No masses or HSM.  No rebound or guarding.  MSKEL: Left hip without edema or gait abnormality. No pain with internal/external rotation. No TTP at the trochanteric bursa. Mild TTP of the left gluteus john/minimus. Grossly normal ROM.  No clubbing. Straight leg test normal on left.   INTEGUMENTARY:  Warm, dry.  No rash noted on exposed skin.  NEUROLOGIC: Facies symmetric.  Grossly normal movement and tone.  No tremor.  PSYCHIATRIC: Affect normal.  Speech fluent.  Though content linear.      ASSESSMENT/PLAN:   1. Essential hypertension  -not yet at goal of <130S, <80D  - lisinopril (ZESTRIL) 40 MG tablet; Take 1 tablet (40 mg) by mouth daily  Dispense: 30 tablet; Refill: 0  - amLODIPine (NORVASC) 5 MG tablet; Take 1 tablet (5 mg) by mouth daily  Dispense: 30 tablet; Refill: 0  -continue checking BP at home  -discussed weight loss for BP improvements- offer nutrition referral at next visit    2. Hip pain, left  -groin/gluteal strain vs mild hip arthritis. No bursitis. Piriformis strain less likely.   -discussed home stretching, IBU/tylenol, + heat  -if no improvement in 2-3 weeks, consider XR + PT    3. (R06.83) Snoring  Comment: no hx of DARRON, STOP-Band score of 4  Plan: consider sleep study in the future    4. Body mass index is 36.64 kg/m .  -discussed healthy diet and exercise     Scarlett Montaño MD  [facility]    Portions of this dictation were created using the Dragon Nuance voice recognition system. Proofreading was completed but there may be errors in text.

## 2021-04-05 NOTE — NURSING NOTE
Chief Complaint   Patient presents with     RECHECK     BP     Here today for recheck of BP. Is having some intermittent L hip pain.     Medication Reconciliation: complete    Radha Cabrera, LPN

## 2021-04-07 PROBLEM — M25.552 HIP PAIN, LEFT: Status: ACTIVE | Noted: 2021-04-07

## 2021-04-15 DIAGNOSIS — K21.00 GASTROESOPHAGEAL REFLUX DISEASE WITH ESOPHAGITIS WITHOUT HEMORRHAGE: Primary | ICD-10-CM

## 2021-04-15 DIAGNOSIS — Z01.818 PRE-OP TESTING: ICD-10-CM

## 2021-04-15 DIAGNOSIS — K44.9 HIATAL HERNIA: ICD-10-CM

## 2021-04-15 RX ORDER — CEFAZOLIN SODIUM 2 G/100ML
2 INJECTION, SOLUTION INTRAVENOUS
Status: CANCELLED | OUTPATIENT
Start: 2021-04-15

## 2021-04-15 RX ORDER — KETOROLAC TROMETHAMINE 30 MG/ML
30 INJECTION, SOLUTION INTRAMUSCULAR; INTRAVENOUS ONCE
Status: CANCELLED | OUTPATIENT
Start: 2021-04-15 | End: 2021-04-15

## 2021-04-15 RX ORDER — ACETAMINOPHEN 325 MG/1
975 TABLET ORAL ONCE
Status: CANCELLED | OUTPATIENT
Start: 2021-04-15 | End: 2021-04-15

## 2021-04-15 RX ORDER — CEFAZOLIN SODIUM 2 G/100ML
2 INJECTION, SOLUTION INTRAVENOUS SEE ADMIN INSTRUCTIONS
Status: CANCELLED | OUTPATIENT
Start: 2021-04-15

## 2021-04-18 ENCOUNTER — ALLIED HEALTH/NURSE VISIT (OUTPATIENT)
Dept: FAMILY MEDICINE | Facility: OTHER | Age: 46
End: 2021-04-18
Attending: FAMILY MEDICINE
Payer: COMMERCIAL

## 2021-04-18 DIAGNOSIS — Z01.818 PRE-OP TESTING: ICD-10-CM

## 2021-04-18 LAB
SARS-COV-2 RNA RESP QL NAA+PROBE: NORMAL
SPECIMEN SOURCE: NORMAL

## 2021-04-18 PROCEDURE — U0003 INFECTIOUS AGENT DETECTION BY NUCLEIC ACID (DNA OR RNA); SEVERE ACUTE RESPIRATORY SYNDROME CORONAVIRUS 2 (SARS-COV-2) (CORONAVIRUS DISEASE [COVID-19]), AMPLIFIED PROBE TECHNIQUE, MAKING USE OF HIGH THROUGHPUT TECHNOLOGIES AS DESCRIBED BY CMS-2020-01-R: HCPCS | Mod: ZL | Performed by: SURGERY

## 2021-04-18 PROCEDURE — C9803 HOPD COVID-19 SPEC COLLECT: HCPCS

## 2021-04-18 PROCEDURE — U0005 INFEC AGEN DETEC AMPLI PROBE: HCPCS | Mod: ZL | Performed by: SURGERY

## 2021-04-20 ENCOUNTER — ANESTHESIA EVENT (OUTPATIENT)
Dept: SURGERY | Facility: OTHER | Age: 46
End: 2021-04-20
Payer: COMMERCIAL

## 2021-04-21 ENCOUNTER — ANESTHESIA (OUTPATIENT)
Dept: SURGERY | Facility: OTHER | Age: 46
End: 2021-04-21
Payer: COMMERCIAL

## 2021-04-21 ENCOUNTER — HOSPITAL ENCOUNTER (INPATIENT)
Facility: OTHER | Age: 46
LOS: 1 days | Discharge: HOME | End: 2021-04-22
Attending: SURGERY | Admitting: SURGERY
Payer: COMMERCIAL

## 2021-04-21 DIAGNOSIS — Z98.890 POSTOPERATIVE STATE: Primary | ICD-10-CM

## 2021-04-21 DIAGNOSIS — K21.00 GASTROESOPHAGEAL REFLUX DISEASE WITH ESOPHAGITIS WITHOUT HEMORRHAGE: ICD-10-CM

## 2021-04-21 DIAGNOSIS — K44.9 HIATAL HERNIA: ICD-10-CM

## 2021-04-21 LAB
CREAT SERPL-MCNC: 0.84 MG/DL (ref 0.6–1.2)
GFR SERPL CREATININE-BSD FRML MDRD: 73 ML/MIN/{1.73_M2}
PLATELET # BLD AUTO: 277 10E9/L (ref 150–450)

## 2021-04-21 PROCEDURE — 250N000011 HC RX IP 250 OP 636: Performed by: NURSE ANESTHETIST, CERTIFIED REGISTERED

## 2021-04-21 PROCEDURE — 360N000077 HC SURGERY LEVEL 4, PER MIN: Performed by: SURGERY

## 2021-04-21 PROCEDURE — 258N000003 HC RX IP 258 OP 636: Performed by: NURSE ANESTHETIST, CERTIFIED REGISTERED

## 2021-04-21 PROCEDURE — 0DV44ZZ RESTRICTION OF ESOPHAGOGASTRIC JUNCTION, PERCUTANEOUS ENDOSCOPIC APPROACH: ICD-10-PCS | Performed by: SURGERY

## 2021-04-21 PROCEDURE — 370N000017 HC ANESTHESIA TECHNICAL FEE, PER MIN: Performed by: SURGERY

## 2021-04-21 PROCEDURE — 999N000141 HC STATISTIC PRE-PROCEDURE NURSING ASSESSMENT: Performed by: SURGERY

## 2021-04-21 PROCEDURE — 250N000011 HC RX IP 250 OP 636: Performed by: SURGERY

## 2021-04-21 PROCEDURE — 272N000001 HC OR GENERAL SUPPLY STERILE: Performed by: SURGERY

## 2021-04-21 PROCEDURE — 43280 LAPAROSCOPY FUNDOPLASTY: CPT | Performed by: SURGERY

## 2021-04-21 PROCEDURE — 120N000001 HC R&B MED SURG/OB

## 2021-04-21 PROCEDURE — 250N000013 HC RX MED GY IP 250 OP 250 PS 637: Performed by: SURGERY

## 2021-04-21 PROCEDURE — 258N000001 HC RX 258: Performed by: SURGERY

## 2021-04-21 PROCEDURE — 250N000009 HC RX 250: Performed by: NURSE ANESTHETIST, CERTIFIED REGISTERED

## 2021-04-21 PROCEDURE — 710N000010 HC RECOVERY PHASE 1, LEVEL 2, PER MIN: Performed by: SURGERY

## 2021-04-21 PROCEDURE — 43280 LAPAROSCOPY FUNDOPLASTY: CPT | Performed by: NURSE ANESTHETIST, CERTIFIED REGISTERED

## 2021-04-21 PROCEDURE — 85049 AUTOMATED PLATELET COUNT: CPT | Performed by: SURGERY

## 2021-04-21 PROCEDURE — 82565 ASSAY OF CREATININE: CPT | Performed by: SURGERY

## 2021-04-21 PROCEDURE — 0BQT4ZZ REPAIR DIAPHRAGM, PERCUTANEOUS ENDOSCOPIC APPROACH: ICD-10-PCS | Performed by: SURGERY

## 2021-04-21 PROCEDURE — 250N000009 HC RX 250: Performed by: SURGERY

## 2021-04-21 PROCEDURE — 250N000025 HC SEVOFLURANE, PER MIN: Performed by: SURGERY

## 2021-04-21 PROCEDURE — 258N000003 HC RX IP 258 OP 636: Performed by: SURGERY

## 2021-04-21 RX ORDER — NALOXONE HYDROCHLORIDE 0.4 MG/ML
0.4 INJECTION, SOLUTION INTRAMUSCULAR; INTRAVENOUS; SUBCUTANEOUS
Status: DISCONTINUED | OUTPATIENT
Start: 2021-04-21 | End: 2021-04-22 | Stop reason: HOSPADM

## 2021-04-21 RX ORDER — MULTIPLE VITAMINS W/ MINERALS TAB 9MG-400MCG
1 TAB ORAL DAILY
COMMUNITY

## 2021-04-21 RX ORDER — ONDANSETRON 2 MG/ML
4 INJECTION INTRAMUSCULAR; INTRAVENOUS EVERY 6 HOURS
Status: DISCONTINUED | OUTPATIENT
Start: 2021-04-21 | End: 2021-04-22 | Stop reason: HOSPADM

## 2021-04-21 RX ORDER — SODIUM CHLORIDE, SODIUM LACTATE, POTASSIUM CHLORIDE, CALCIUM CHLORIDE 600; 310; 30; 20 MG/100ML; MG/100ML; MG/100ML; MG/100ML
INJECTION, SOLUTION INTRAVENOUS CONTINUOUS
Status: DISCONTINUED | OUTPATIENT
Start: 2021-04-21 | End: 2021-04-21 | Stop reason: HOSPADM

## 2021-04-21 RX ORDER — SODIUM CHLORIDE, SODIUM LACTATE, POTASSIUM CHLORIDE, CALCIUM CHLORIDE 600; 310; 30; 20 MG/100ML; MG/100ML; MG/100ML; MG/100ML
INJECTION, SOLUTION INTRAVENOUS CONTINUOUS PRN
Status: DISCONTINUED | OUTPATIENT
Start: 2021-04-21 | End: 2021-04-21

## 2021-04-21 RX ORDER — ONDANSETRON 2 MG/ML
INJECTION INTRAMUSCULAR; INTRAVENOUS PRN
Status: DISCONTINUED | OUTPATIENT
Start: 2021-04-21 | End: 2021-04-21

## 2021-04-21 RX ORDER — PROPOFOL 10 MG/ML
INJECTION, EMULSION INTRAVENOUS PRN
Status: DISCONTINUED | OUTPATIENT
Start: 2021-04-21 | End: 2021-04-21

## 2021-04-21 RX ORDER — SODIUM CHLORIDE, SODIUM LACTATE, POTASSIUM CHLORIDE, CALCIUM CHLORIDE 600; 310; 30; 20 MG/100ML; MG/100ML; MG/100ML; MG/100ML
INJECTION, SOLUTION INTRAVENOUS CONTINUOUS
Status: DISCONTINUED | OUTPATIENT
Start: 2021-04-21 | End: 2021-04-22

## 2021-04-21 RX ORDER — BUPIVACAINE HYDROCHLORIDE AND EPINEPHRINE 5; 5 MG/ML; UG/ML
INJECTION, SOLUTION PERINEURAL PRN
Status: DISCONTINUED | OUTPATIENT
Start: 2021-04-21 | End: 2021-04-21 | Stop reason: HOSPADM

## 2021-04-21 RX ORDER — KETOROLAC TROMETHAMINE 30 MG/ML
30 INJECTION, SOLUTION INTRAMUSCULAR; INTRAVENOUS ONCE
Status: COMPLETED | OUTPATIENT
Start: 2021-04-21 | End: 2021-04-21

## 2021-04-21 RX ORDER — KETOROLAC TROMETHAMINE 30 MG/ML
30 INJECTION, SOLUTION INTRAMUSCULAR; INTRAVENOUS EVERY 6 HOURS
Status: DISCONTINUED | OUTPATIENT
Start: 2021-04-21 | End: 2021-04-22 | Stop reason: HOSPADM

## 2021-04-21 RX ORDER — NALOXONE HYDROCHLORIDE 0.4 MG/ML
0.2 INJECTION, SOLUTION INTRAMUSCULAR; INTRAVENOUS; SUBCUTANEOUS
Status: DISCONTINUED | OUTPATIENT
Start: 2021-04-21 | End: 2021-04-22 | Stop reason: HOSPADM

## 2021-04-21 RX ORDER — LIDOCAINE 40 MG/G
CREAM TOPICAL
Status: DISCONTINUED | OUTPATIENT
Start: 2021-04-21 | End: 2021-04-21 | Stop reason: HOSPADM

## 2021-04-21 RX ORDER — ACETAMINOPHEN 325 MG/1
650 TABLET ORAL EVERY 4 HOURS PRN
Status: DISCONTINUED | OUTPATIENT
Start: 2021-04-24 | End: 2021-04-22 | Stop reason: HOSPADM

## 2021-04-21 RX ORDER — HYDROMORPHONE HYDROCHLORIDE 1 MG/ML
0.4 INJECTION, SOLUTION INTRAMUSCULAR; INTRAVENOUS; SUBCUTANEOUS
Status: DISCONTINUED | OUTPATIENT
Start: 2021-04-21 | End: 2021-04-22 | Stop reason: HOSPADM

## 2021-04-21 RX ORDER — LIDOCAINE 40 MG/G
CREAM TOPICAL
Status: DISCONTINUED | OUTPATIENT
Start: 2021-04-21 | End: 2021-04-22 | Stop reason: HOSPADM

## 2021-04-21 RX ORDER — GLYCOPYRROLATE 0.2 MG/ML
INJECTION, SOLUTION INTRAMUSCULAR; INTRAVENOUS PRN
Status: DISCONTINUED | OUTPATIENT
Start: 2021-04-21 | End: 2021-04-21

## 2021-04-21 RX ORDER — ONDANSETRON 4 MG/1
4 TABLET, ORALLY DISINTEGRATING ORAL EVERY 30 MIN PRN
Status: DISCONTINUED | OUTPATIENT
Start: 2021-04-21 | End: 2021-04-21 | Stop reason: HOSPADM

## 2021-04-21 RX ORDER — DIPHENHYDRAMINE HYDROCHLORIDE 50 MG/ML
25 INJECTION INTRAMUSCULAR; INTRAVENOUS EVERY 6 HOURS PRN
Status: DISCONTINUED | OUTPATIENT
Start: 2021-04-21 | End: 2021-04-22 | Stop reason: HOSPADM

## 2021-04-21 RX ORDER — DEXAMETHASONE SODIUM PHOSPHATE 4 MG/ML
INJECTION, SOLUTION INTRA-ARTICULAR; INTRALESIONAL; INTRAMUSCULAR; INTRAVENOUS; SOFT TISSUE PRN
Status: DISCONTINUED | OUTPATIENT
Start: 2021-04-21 | End: 2021-04-21

## 2021-04-21 RX ORDER — CEFAZOLIN SODIUM 2 G/100ML
2 INJECTION, SOLUTION INTRAVENOUS SEE ADMIN INSTRUCTIONS
Status: DISCONTINUED | OUTPATIENT
Start: 2021-04-21 | End: 2021-04-21 | Stop reason: HOSPADM

## 2021-04-21 RX ORDER — ACETAMINOPHEN 325 MG/1
975 TABLET ORAL EVERY 8 HOURS
Status: DISCONTINUED | OUTPATIENT
Start: 2021-04-21 | End: 2021-04-22 | Stop reason: HOSPADM

## 2021-04-21 RX ORDER — CEFAZOLIN SODIUM 2 G/100ML
2 INJECTION, SOLUTION INTRAVENOUS
Status: DISCONTINUED | OUTPATIENT
Start: 2021-04-21 | End: 2021-04-21 | Stop reason: HOSPADM

## 2021-04-21 RX ORDER — HYDROMORPHONE HYDROCHLORIDE 1 MG/ML
.3-.5 INJECTION, SOLUTION INTRAMUSCULAR; INTRAVENOUS; SUBCUTANEOUS EVERY 5 MIN PRN
Status: DISCONTINUED | OUTPATIENT
Start: 2021-04-21 | End: 2021-04-21 | Stop reason: HOSPADM

## 2021-04-21 RX ORDER — AMLODIPINE BESYLATE 5 MG/1
5 TABLET ORAL DAILY
Status: DISCONTINUED | OUTPATIENT
Start: 2021-04-22 | End: 2021-04-22 | Stop reason: HOSPADM

## 2021-04-21 RX ORDER — HYDROCODONE BITARTRATE AND ACETAMINOPHEN 7.5; 325 MG/15ML; MG/15ML
10 SOLUTION ORAL EVERY 4 HOURS PRN
Status: DISCONTINUED | OUTPATIENT
Start: 2021-04-21 | End: 2021-04-22 | Stop reason: HOSPADM

## 2021-04-21 RX ORDER — FENTANYL CITRATE 50 UG/ML
INJECTION, SOLUTION INTRAMUSCULAR; INTRAVENOUS PRN
Status: DISCONTINUED | OUTPATIENT
Start: 2021-04-21 | End: 2021-04-21

## 2021-04-21 RX ORDER — FENTANYL CITRATE 50 UG/ML
25-50 INJECTION, SOLUTION INTRAMUSCULAR; INTRAVENOUS
Status: DISCONTINUED | OUTPATIENT
Start: 2021-04-21 | End: 2021-04-21 | Stop reason: HOSPADM

## 2021-04-21 RX ORDER — DIPHENHYDRAMINE HCL 25 MG
25 CAPSULE ORAL EVERY 6 HOURS PRN
Status: DISCONTINUED | OUTPATIENT
Start: 2021-04-21 | End: 2021-04-22 | Stop reason: HOSPADM

## 2021-04-21 RX ORDER — ACETAMINOPHEN 325 MG/1
975 TABLET ORAL ONCE
Status: COMPLETED | OUTPATIENT
Start: 2021-04-21 | End: 2021-04-21

## 2021-04-21 RX ORDER — HYDROMORPHONE HYDROCHLORIDE 1 MG/ML
0.2 INJECTION, SOLUTION INTRAMUSCULAR; INTRAVENOUS; SUBCUTANEOUS
Status: DISCONTINUED | OUTPATIENT
Start: 2021-04-21 | End: 2021-04-22 | Stop reason: HOSPADM

## 2021-04-21 RX ORDER — NEOSTIGMINE METHYLSULFATE 1 MG/ML
VIAL (ML) INJECTION PRN
Status: DISCONTINUED | OUTPATIENT
Start: 2021-04-21 | End: 2021-04-21

## 2021-04-21 RX ORDER — ONDANSETRON 2 MG/ML
4 INJECTION INTRAMUSCULAR; INTRAVENOUS EVERY 30 MIN PRN
Status: DISCONTINUED | OUTPATIENT
Start: 2021-04-21 | End: 2021-04-21 | Stop reason: HOSPADM

## 2021-04-21 RX ORDER — LIDOCAINE HYDROCHLORIDE 20 MG/ML
INJECTION, SOLUTION INFILTRATION; PERINEURAL PRN
Status: DISCONTINUED | OUTPATIENT
Start: 2021-04-21 | End: 2021-04-21

## 2021-04-21 RX ADMIN — DIPHENHYDRAMINE HYDROCHLORIDE 25 MG: 50 INJECTION, SOLUTION INTRAMUSCULAR; INTRAVENOUS at 12:50

## 2021-04-21 RX ADMIN — PROPOFOL 200 MG: 10 INJECTION, EMULSION INTRAVENOUS at 07:37

## 2021-04-21 RX ADMIN — ONDANSETRON 4 MG: 2 INJECTION INTRAMUSCULAR; INTRAVENOUS at 12:39

## 2021-04-21 RX ADMIN — ONDANSETRON 4 MG: 2 INJECTION INTRAMUSCULAR; INTRAVENOUS at 09:57

## 2021-04-21 RX ADMIN — GLYCOPYRROLATE 0.4 MG: 0.2 INJECTION, SOLUTION INTRAMUSCULAR; INTRAVENOUS at 09:57

## 2021-04-21 RX ADMIN — HYDROCODONE BITARTRATE AND ACETAMINOPHEN 10 ML: 7.5; 325 SOLUTION ORAL at 17:14

## 2021-04-21 RX ADMIN — MIDAZOLAM 2 MG: 1 INJECTION INTRAMUSCULAR; INTRAVENOUS at 07:33

## 2021-04-21 RX ADMIN — SODIUM CHLORIDE 8 MCG: 900 INJECTION INTRAVENOUS at 09:57

## 2021-04-21 RX ADMIN — ACETAMINOPHEN 975 MG: 325 TABLET, FILM COATED ORAL at 07:10

## 2021-04-21 RX ADMIN — KETOROLAC TROMETHAMINE 30 MG: 30 INJECTION, SOLUTION INTRAMUSCULAR; INTRAVENOUS at 07:25

## 2021-04-21 RX ADMIN — ONDANSETRON 4 MG: 2 INJECTION INTRAMUSCULAR; INTRAVENOUS at 18:18

## 2021-04-21 RX ADMIN — GLYCOPYRROLATE 0.2 MG: 0.2 INJECTION, SOLUTION INTRAMUSCULAR; INTRAVENOUS at 07:33

## 2021-04-21 RX ADMIN — CEFAZOLIN SODIUM 2 G: 2 INJECTION, SOLUTION INTRAVENOUS at 09:33

## 2021-04-21 RX ADMIN — FENTANYL CITRATE 50 MCG: 50 INJECTION, SOLUTION INTRAMUSCULAR; INTRAVENOUS at 09:45

## 2021-04-21 RX ADMIN — NEOSTIGMINE METHYLSULFATE 3 MG: 1 INJECTION INTRAVENOUS at 09:57

## 2021-04-21 RX ADMIN — SODIUM CHLORIDE, POTASSIUM CHLORIDE, SODIUM LACTATE AND CALCIUM CHLORIDE: 600; 310; 30; 20 INJECTION, SOLUTION INTRAVENOUS at 12:11

## 2021-04-21 RX ADMIN — LIDOCAINE HYDROCHLORIDE 100 MG: 20 INJECTION, SOLUTION INFILTRATION; PERINEURAL at 07:37

## 2021-04-21 RX ADMIN — CEFAZOLIN SODIUM 2 G: 2 INJECTION, SOLUTION INTRAVENOUS at 07:33

## 2021-04-21 RX ADMIN — ACETAMINOPHEN 975 MG: 325 TABLET ORAL at 15:02

## 2021-04-21 RX ADMIN — SODIUM CHLORIDE, POTASSIUM CHLORIDE, SODIUM LACTATE AND CALCIUM CHLORIDE: 600; 310; 30; 20 INJECTION, SOLUTION INTRAVENOUS at 07:32

## 2021-04-21 RX ADMIN — FENTANYL CITRATE 50 MCG: 50 INJECTION, SOLUTION INTRAMUSCULAR; INTRAVENOUS at 10:10

## 2021-04-21 RX ADMIN — SODIUM CHLORIDE, POTASSIUM CHLORIDE, SODIUM LACTATE AND CALCIUM CHLORIDE: 600; 310; 30; 20 INJECTION, SOLUTION INTRAVENOUS at 09:42

## 2021-04-21 RX ADMIN — ENOXAPARIN SODIUM 40 MG: 100 INJECTION SUBCUTANEOUS at 07:27

## 2021-04-21 RX ADMIN — ROCURONIUM BROMIDE 20 MG: 10 INJECTION INTRAVENOUS at 08:52

## 2021-04-21 RX ADMIN — SODIUM CHLORIDE, POTASSIUM CHLORIDE, SODIUM LACTATE AND CALCIUM CHLORIDE: 600; 310; 30; 20 INJECTION, SOLUTION INTRAVENOUS at 17:30

## 2021-04-21 RX ADMIN — SODIUM CHLORIDE, POTASSIUM CHLORIDE, SODIUM LACTATE AND CALCIUM CHLORIDE: 600; 310; 30; 20 INJECTION, SOLUTION INTRAVENOUS at 07:25

## 2021-04-21 RX ADMIN — ROCURONIUM BROMIDE 20 MG: 10 INJECTION INTRAVENOUS at 08:19

## 2021-04-21 RX ADMIN — ROCURONIUM BROMIDE 10 MG: 10 INJECTION INTRAVENOUS at 09:30

## 2021-04-21 RX ADMIN — ROCURONIUM BROMIDE 50 MG: 10 INJECTION INTRAVENOUS at 07:37

## 2021-04-21 RX ADMIN — DEXAMETHASONE SODIUM PHOSPHATE 8 MG: 4 INJECTION, SOLUTION INTRA-ARTICULAR; INTRALESIONAL; INTRAMUSCULAR; INTRAVENOUS; SOFT TISSUE at 09:57

## 2021-04-21 RX ADMIN — SODIUM CHLORIDE, POTASSIUM CHLORIDE, SODIUM LACTATE AND CALCIUM CHLORIDE: 600; 310; 30; 20 INJECTION, SOLUTION INTRAVENOUS at 08:07

## 2021-04-21 RX ADMIN — FENTANYL CITRATE 100 MCG: 50 INJECTION, SOLUTION INTRAMUSCULAR; INTRAVENOUS at 07:33

## 2021-04-21 RX ADMIN — KETOROLAC TROMETHAMINE 30 MG: 30 INJECTION, SOLUTION INTRAMUSCULAR; INTRAVENOUS at 13:30

## 2021-04-21 RX ADMIN — SODIUM CHLORIDE 12 MCG: 900 INJECTION INTRAVENOUS at 08:09

## 2021-04-21 RX ADMIN — KETOROLAC TROMETHAMINE 30 MG: 30 INJECTION, SOLUTION INTRAMUSCULAR; INTRAVENOUS at 19:35

## 2021-04-21 RX ADMIN — FENTANYL CITRATE 50 MCG: 50 INJECTION, SOLUTION INTRAMUSCULAR; INTRAVENOUS at 09:57

## 2021-04-21 ASSESSMENT — ACTIVITIES OF DAILY LIVING (ADL)
ADLS_ACUITY_SCORE: 15

## 2021-04-21 NOTE — OP NOTE
PREOPERATIVE  DIAGNOSIS: GERD, hiatal hernia      POSTOPERATIVE DIAGNOSIS:GERD, hiatal hernia     PROCEDURE PERFORMED:  Laparoscopic repair hiatal hernia, Nissen fundoplication       SURGEON:  Damien Ferguson MD     ASSISTANT: Circulator: Laura Cleveland RN  Relief Circulator: Fifi Mena RN  Scrub Person: Cande Campos   Assistant: Linwood Grewal RN    ANESTHESIA: GeneralCRNA Independent: Kellerman, David, APRN CRNA    FAMILYPHYSICIAN: Scarlett Montaño      INDICATION FOR THE PROCEDURE:  The patient is a 45 year old y.o. female with a GERD, hiatal hernia.         PROCEDURE:  Yanna Ordonez was brought to the operating room placed in supine position.    General anesthetic was applied and the patient was intubated.  Elise catheter was placed. Patient was prepped and draped in usual sterile fashion.  Timeout was performed and everyone was in agreement to proceed.  The supraumbilical area was anesthetized with local anesthetic.  A 1cm transverse incision was made. The subcutaneous tissues were dissected with hemostat clamp down to the level of the fascia.  The umbilical stalk was grasped and elevated. The Veress needle was placed in the abdomen and drop test confirmed abdominal placement.  Insufflation was applied to the abdomen and the abdomen was insufflated to 15 mmHg.  The 5 mm 30 degree scope was placed in an Optiview trocar and this set up was slowly advanced through the incision into the abdomen under direct vision. The obturator was removed from the port and the camera was reinserted the abdomen and there did not appear to be any injury to the underlying bowel.   A 5 mm port was placed in the left upper quadrant under direct vision.  The scope was placed in this port and the supraumbilical port was swapped for a 12 mm port under direct vision.  Additional 5 mm ports were placed on the far right lateral and right upper quadrant under direct vision.  An additional 12 mm port was  placed in the epigastrium under direct vision.  Patient was placed in reverse Trendelenburg position.  Liver retractor was placed in the far right lateral 5 mm port and secured in place once the liver was elevated.  When liver was elevated this revealed a hiatal hernia with stomach up into the chest.  The stomach was easily reducible.  I opened up the gastrohepatic ligament with the Harmonic and was able to dissect all the way up to the right carolin.  With the right carolin exposed I opened up the peritoneum just medial to the right carolin and continued this dissection all the way over to the very anterior portion of the left carolin.  This point I was able to basically reduce the hiatal hernia sac.  I entered my attention to the greater curvature of the stomach.  I picked a point that was well distal to the spleen and began taking down the short gastrics with Harmonic.  I slowly took down the short gastrics and a few minor posterior attachments to the stomach with harmonic all the way up to the left carolin.  I was able to elevate the stomach pretty effectively and do a fair amount of dissection posterior to the stomach up into the carolin.  I was able to actually get through to the right carolin from the left at this point.  I then turned my attention to the left carolin and opened up the peritoneum just medial to the right carolin and extended this dissection all the way to the anterior portion of the left carolin to meet the right-sided dissection.  The hiatal hernia sac was completely reduced at this point.  The GE junction currently sitting right at the esophageal hiatus.  I turned my attention back to the right and completed a window in the posterior of the stomach to allow a Penrose drain to be passed behind the GE junction.  This was used for traction as I continued dissection up into the chest to obtain more intra-abdominal esophageal length.  I dissected well up into the chest mostly pushing the side loose areolar tissue but taken  down some thick adhesions that are likely due to patient's chronic reflux.  Of note, anatomical planes are mostly intact.  The vagus nerves were visualized and spared.  Once I was able to achieve for the 2-1/2 cm of intra-abdominal esophageal length I then made sure the posterior window of the stomach was large enough to accept the wrap.  With a wide posterior window in the crura fully dissected and then passed a 56 Chadian bougie in order to start the repair of the hiatal hernia.  I used for 0 Surgidac sutures in a simple interrupted fashion to close the esophageal hiatus snug around the esophagus with a 56 bleeding place.  We removed the Penrose drain and laid the stomach out in the abdomen as it normally sits.  I grabbed the most mobile portion of the greater curvature of the stomach and passed this behind the GE junction.  The stomach stayed in place indicating adequate posterior dissection.  I then elected to excise the anterior fat pad along with the hernia sac in order to sew directly to esophagus and stomach.  I then grasped the wrap and performed a shoeshine technique to ensure that this is not twisted, and it is not.  I then fashioned a 360 degree fundoplication with 4 sutures cramping the posterior portion of the wrap the, the esophagus, then the greater curvature to the left.  The wrap is roughly 5 cm in length.  The wrap was examined and appeared to be intact and floppy.  Of note, the wrap was performed over the 56 Chadian bougie.  The upper abdomen was inspected for bleeding and there is no signs of oozing.  The upper abdomen was irrigated with normal saline and returns were clear.  Patient was placed back into flat supine position and the liver retractor was removed.  The 12 mm ports were removed under direct vision and closed with 0 Vicryl using a Tomas-Rossi needle.  The additional 5 mm ports were removed under direct vision and the abdomen was desufflated.    Skin was closed with a running 4-0  Monocryl suture.  The skin was cleansed and dried and Dermabond was applied to the incision.  At the end of the case all sponge and needle counts were correct.  The catheter was removed.  The patient tolerated procedure well. They were extubated in the OR and was taken to the recovery room in good condition.        TIMA Ferguson MD

## 2021-04-21 NOTE — ANESTHESIA CARE TRANSFER NOTE
Patient: Yanna Ordonez    Procedure(s):  FUNDOPLICATION, NISSEN, LAPAROSCOPIC    Diagnosis: Gastroesophageal reflux disease with esophagitis without hemorrhage [K21.00]  Hiatal hernia [K44.9]  Diagnosis Additional Information: No value filed.    Anesthesia Type:   General     Note:      Level of Consciousness: awake  Oxygen Supplementation: face mask (Transported on 8 L/min)  Level of Supplemental Oxygen (L/min / FiO2): 100  Independent Airway: airway patency satisfactory and stable  Dentition: dentition unchanged  Vital Signs Stable: post-procedure vital signs reviewed and stable  Report to RN Given: handoff report given  Patient transferred to: PACU    Handoff Report: Identifed the Patient, Identified the Reponsible Provider, Reviewed the pertinent medical history, Discussed the surgical course, Reviewed Intra-OP anesthesia mangement and issues during anesthesia, Set expectations for post-procedure period and Allowed opportunity for questions and acknowledgement of understanding      Vitals: (Last set prior to Anesthesia Care Transfer)  CRNA VITALS  4/21/2021 0943 - 4/21/2021 1025      4/21/2021             Resp Rate (set):  10        Electronically Signed By: David Kellerman, APRN CRNA  April 21, 2021  10:25 AM

## 2021-04-21 NOTE — PROGRESS NOTES
[facility] ADMISSION NOTE    Patient admitted to room 318 at approximately 1145 via bed from surgery. Patient was accompanied by spouse.     Verbal SBAR report received from Kayley prior to patient arrival.     Patient alert and oriented X 3. Pain is controlled without any medications.  . Admission vital signs: Blood pressure 109/60, pulse 65, temperature 98.2  F (36.8  C), temperature source Temporal, resp. rate 12, weight 99.9 kg (220 lb 3.2 oz), last menstrual period 06/02/2018, SpO2 95 %, not currently breastfeeding. Patient was oriented to plan of care, call light, bed controls, tv, telephone, bathroom and visiting hours.     Risk Assessment    The following safety risks were identified during admission: fall. Yellow risk band applied: YES.     Skin Initial Assessment    This writer admitted this patient and completed a full skin assessment and Bryce score in the Adult PCS flowsheet. Appropriate interventions initiated as needed.       Bryce Risk Assessment  Sensory Perception: 4-->no impairment  Moisture: 4-->rarely moist  Activity: 4-->walks frequently  Mobility: 4-->no limitation  Nutrition: 4-->excellent  Friction and Shear: 3-->no apparent problem  Bryce Score: 23    Education    Patient has a [place] to Observation order: No  Observation education completed and documented: N/A      Roscoe Nava RN

## 2021-04-21 NOTE — BRIEF OP NOTE
[facility]    Brief Operative Note    Pre-operative diagnosis: Gastroesophageal reflux disease with esophagitis without hemorrhage [K21.00]  Hiatal hernia [K44.9]  Post-operative diagnosis Same as pre-operative diagnosis    Procedure: Procedure(s):  FUNDOPLICATION, NISSEN, LAPAROSCOPIC  Surgeon: Surgeon(s) and Role:     * Damien Ferguson MD - Primary  Anesthesia: General   Estimated blood loss: 25 mL  Drains: None  Specimens: * No specimens in log *  Findings:   wide hiatal hernia.  Complications: None.  Implants: * No implants in log *

## 2021-04-21 NOTE — ANESTHESIA POSTPROCEDURE EVALUATION
Patient: Yanna Ordonez    Procedure(s):  FUNDOPLICATION, NISSEN, LAPAROSCOPIC    Diagnosis:Gastroesophageal reflux disease with esophagitis without hemorrhage [K21.00]  Hiatal hernia [K44.9]  Diagnosis Additional Information: No value filed.    Anesthesia Type:  General    Note:  Disposition: Admission   Postop Pain Control: Uneventful            Sign Out: Well controlled pain   PONV: No   Neuro/Psych: Uneventful            Sign Out: Acceptable/Baseline neuro status   Airway/Respiratory: Uneventful            Sign Out: Acceptable/Baseline resp. status   CV/Hemodynamics: Uneventful            Sign Out: Acceptable CV status; No obvious hypovolemia; No obvious fluid overload   Other NRE: NONE   DID A NON-ROUTINE EVENT OCCUR? No           Last vitals:  Vitals:    04/21/21 1038 04/21/21 1040 04/21/21 1041   BP:  109/60    Pulse:  65    Resp:      Temp:      SpO2: 94% 94% 95%       Last vitals prior to Anesthesia Care Transfer:  CRNA VITALS  4/21/2021 0943 - 4/21/2021 1043      4/21/2021             Resp Rate (set):  10          Electronically Signed By: ANGEL HAWKINS CRNA  April 21, 2021  11:39 AM

## 2021-04-21 NOTE — H&P
GENERAL SURGERY CONSULTATION NOTE    Yanna Ordonez   [address] [facility] [place] [address]  45 year old  female    Primary Care Provider:  Scarlett Montaño      HPI: Yanna Ordonez presents to day surgery for Nissen fundoplication.   Demeester 33.7.   Esophageal manometry shows normal relaxation of the lower esophagus and overall effective swallows.  There is some high pressure readings in the upper esophagus, this can be due to reflux.   Can easily achieve 4 METs  Reflux is refractory to maximal medical therapy and patient has small hiatal hernia.       REVIEW OF SYSTEMS:    GENERAL: No fevers or chills. Denies fatigue, recent weight loss.  HEENT: No sinus drainage. No changes with vision or hearing. No difficulty swallowing.   LYMPHATICS:  Noswollen nodes in axilla, neck or groin.  CARDIOVASCULAR: Denies chest pain, palpitations and dyspnea on exertion.  PULMONARY: No shortness of breath or cough. No increase in sputum production.  GI: Denies melena,bright red blood in stools. No hematemesis. No constipation or diarrhea.  : No dysuria or hematuria.  SKIN: No recent rashes or ulcers.   HEMATOLOGY:  No history of easy bruising or bleeding.  ENDOCRINE:  No history of diabetes or thyroid problems.  NEUROLOGY:  No history of seizures or headaches. No motor or sensory changes.        Patient Active Problem List   Diagnosis     Carpal tunnel syndrome, bilateral     Hiatal hernia     Neck pain, chronic     Excessive or frequent menstruation     Lightheadedness     S/P hysterectomy     Recurrent periodic urticaria     Arthritis of finger of both hands     Gastroesophageal reflux disease with esophagitis without hemorrhage     Dyshidrotic eczema     History of 2019 novel coronavirus disease (COVID-19)     Morbid obesity (H)     Snoring     Essential hypertension     IFG (impaired fasting glucose)     Hip pain, left       Past Medical History:   Diagnosis Date     Personal history of other medical  treatment (CODE)     Childbirth       Past Surgical History:   Procedure Laterality Date     DILATION AND CURETTAGE      ,after SAB     HYSTERECTOMY VAGINAL N/A 2018    Procedure: HYSTERECTOMY VAGINAL;  Total Vaginal Hysterectomy and Bilateral Salpingectomy, Left oophorectomy;  Surgeon: Demarco Verdugo MD;  Location:  OR       Family History   Problem Relation Age of Onset     Other - See Comments Mother 65        Celiac disease     Other - See Comments Father         Glaucoma     Family History Negative Sister         Good Health     Family History Negative Sister         Good Health     Family History Negative Brother         Good Health     Family History Negative Brother         Good Health,Twin     Family History Negative Brother         Good Health,Twin - Had GIB     Heart Disease No family hx of         Heart Disease,No known premature CAD     Diabetes No family hx of         Diabetes,DM2     Breast Cancer No family hx of         Cancer-breast     Colon Cancer No family hx of         Cancer-colon       Social History     Social History Narrative    Lives with  and three children ,   Poonam,  -  at [employer],  -  at [employer],  - Graduated.   Works at [email or web address].        : Enjoys Dacudaking during the summer.       Social History     Socioeconomic History     Marital status:      Spouse name: Jitendra     Number of children: Not on file     Years of education: Not on file     Highest education level: Not on file   Occupational History     Not on file   Social Needs     Financial resource strain: Not on file     Food insecurity     Worry: Not on file     Inability: Not on file     Transportation needs     Medical: Not on file     Non-medical: Not on file   Tobacco Use     Smoking status: Former Smoker     Packs/day: 0.50     Types: Cigarettes     Quit date: 10/1/2015     Years since quittin.5     Smokeless tobacco: Never  Used     Tobacco comment: Quit smoking: Had quit for 6 years and started again about a month ago   Substance and Sexual Activity     Alcohol use: No     Alcohol/week: 1.7 standard drinks     Drug use: No     Sexual activity: Yes     Partners: Male     Birth control/protection: Female Surgical     Comment: Hysterectomy 8/18   Lifestyle     Physical activity     Days per week: Not on file     Minutes per session: Not on file     Stress: Not on file   Relationships     Social connections     Talks on phone: Not on file     Gets together: Not on file     Attends Confucianism service: Not on file     Active member of club or organization: Not on file     Attends meetings of clubs or organizations: Not on file     Relationship status: Not on file     Intimate partner violence     Fear of current or ex partner: Not on file     Emotionally abused: Not on file     Physically abused: Not on file     Forced sexual activity: Not on file   Other Topics Concern     Parent/sibling w/ CABG, MI or angioplasty before 65F 55M? Not Asked   Social History Narrative    Lives with  and three children ,   Poonam, 1993 -  at [facility] [employer], 1996 -  at [employer], 1998 - Graduated.   Works at [employer].        2021: Enjoys kayaking during the summer.       No current facility-administered medications on file prior to encounter.   amLODIPine (NORVASC) 5 MG tablet, Take 1 tablet (5 mg) by mouth daily  famotidine (PEPCID) 20 MG tablet, Take 1 tablet (20 mg) by mouth 2 times daily  lisinopril (ZESTRIL) 10 MG tablet, Take 3 tablets (30 mg) by mouth daily  lisinopril (ZESTRIL) 40 MG tablet, Take 1 tablet (40 mg) by mouth daily  pantoprazole (PROTONIX) 40 MG EC tablet, Take 1 tablet by mouth once daily.  triamcinolone (KENALOG) 0.1 % external cream, Apply topically 2 times daily as needed for irritation          ALLERGIES/SENSITIVITIES: No Known Allergies    PHYSICAL EXAM:     BP (!) 141/86 (Cuff  Size: Adult Regular)   Pulse 78   Temp 98.1  F (36.7  C) (Tympanic)   Resp 18   Wt 99.9 kg (220 lb 3.2 oz)   LMP 06/02/2018   SpO2 99%   BMI 36.64 kg/m      General Appearance:   Sitting up in the chair, no apparent distress  HEENT: Pupils are equal and reactive, no scleral icterus,   Heart & CV:  RRR no murmur.  Intact distal pulses, good cap refill.  LUNGS: No increased work of breathing.Lugns are CTA B/L, no wheezing or crackles.  Abd:  Soft, non-tender  Ext: normal bulk and tone  Neuro: alert and oriented         CONSULTATION ASSESSMENT AND PLAN:    45 year old female with GERD and hiatal hernia.    The technical details of laparoscopic hiatal hernia repair and Nissen fundoplication were discussed with the patient along with the risk and benefits include bleeding, infection, recurrent GERD, recurrent hiatal hernia, slipped Nissen wrap, gas bloat, injury surrounding structures including spleen, liver, pancreas, duodenum,  colon, small intestine and omentum.  The patient demonstrates understanding and is willing to proceed with surgery once we have ensured her esophagus functions normally.     To OR for laparoscopic hiatal hernia repair and Nissen fundoplication        Damien Ferguson MD on 4/21/2021 at 7:08 AM

## 2021-04-21 NOTE — PHARMACY-ADMISSION MEDICATION HISTORY
Pharmacy -- Admission Medication Reconciliation    Prior to admission (PTA) medications were reviewed and the patient's PTA medication list was updated.    Sources Consulted: patient, sure scripts    The reliability of this Medication Reconciliation is: Reliability: Reliable    The following significant changes were made:  Removed triamcinolone cream  Removed lisinopril 10 mg tablets (patient is taking 40 mg tablets)  Added daily multivitamin    In addition, the patient's allergies were reviewed with the patient and updated as follows:   Allergies: Patient has no known allergies.    The pharmacist has reviewed with the patient that all personal medications should be removed from the building or locked in the belongings safe.  Patient shall only take medications ordered by the physician and administered by the nursing staff.       Medication barriers identified: none noted   Medication adherence concerns: none noted   Understanding of emergency medications: ESTEPHANIA Fisher [facility], 4/21/2021,  2:44 PM

## 2021-04-21 NOTE — ANESTHESIA PROCEDURE NOTES
Airway       Patient location during procedure: OR       Procedure Start/Stop Times: 4/21/2021 7:37 AM  Staff -        CRNA: Kellerman, David, APRN CRNA       Performed By: CRNA  Consent for Airway        Urgency: elective  Indications and Patient Condition       Indications for airway management: tonie-procedural       Induction type:intravenous       Mask difficulty assessment: 0 - not attempted    Final Airway Details       Final airway type: endotracheal airway       Successful airway: ETT - single  Endotracheal Airway Details        ETT size (mm): 7.0       Cuffed: yes       Cuff volume (mL): 8       Successful intubation technique: direct laryngoscopy       DL Blade Type: MAC 4       Grade View of Cords: 2       Adjucts: stylet       Position: Right       Measured from: gums/teeth       Secured at (cm): 21       Bite block used: None    Post intubation assessment        Placement verified by: capnometry, equal breath sounds and chest rise        Number of attempts at approach: 1       Secured with: silk tape       Ease of procedure: easy       Dentition: Intact and Unchanged    Medication(s) Administered   Medication Administration Time: 4/21/2021 7:37 AM

## 2021-04-21 NOTE — PROGRESS NOTES
Patient is alert and orientated. Complaint of bilat shoulder gas pains, heat applied and this has improved. Moderate abdominal pain, increases with ambulation, ice applied and prn hydrocodone given, 5 lap sites glued, no drainage noted, scant bruising and erythema. Abdomen is soft and tender in LLQ only, hypoactive bowel sounds, not passing flatus yet, but is burping. Lungs clear throughout. Heart regular. Numbness and tingling to left hand and right fingers intermittently, chronic r/t carpal tunnel. Trace bilat foot edema, legs elevated as tolerated. Intermittent nausea, scheduled zofran has been sufficient. Tolerating water and clear diet well thus far. Voiding sufficiently. Broke out in hives to hairline, neck and back, unsure of etiology, occurred prior to any med administration on the floor, gave prn benadryl and this improved. Sore throat from ETT, has improved with clear liquid consumption. SBA with ambulation d/t pain increase with movement. Slightly hypertensive but vitally stable.     BP (!) 143/82 (BP Location: Left arm)   Pulse 101   Temp 99.1  F (37.3  C) (Tympanic)   Resp 16   Wt 99.9 kg (220 lb 3.2 oz)   LMP 06/02/2018   SpO2 96%   BMI 36.64 kg/m

## 2021-04-21 NOTE — OR NURSING
PACU Transfer Note    Yanna Ordonez was transferred to [facility] via bed.  Equipment used for transport:  none.  Accompanied by:  Kayley Malone RN  Prescriptions were: none    PACU Respiratory Event Documentation     1) Episodes of Apnea greater than or equal to 10 seconds: 0    2) Bradypnea - less than 8 breaths per minute: 0    3) Pain score on 0 to 10 scale: 0    4) Pain-sedation mismatch (yes or no): no    5) Repeated 02 desaturation less than 90% (yes or no): no    Anesthesia notified? (yes or no): na    Any of the above events occuring repeatedly in separate 30 minute intervals may be considered recurrent PACU respiratory events.    Patient stable and meets phase 1 discharge criteria for transport from PACU.  Report given to Roscoe Malone RN on 4/21/2021 at 11:27 AM

## 2021-04-21 NOTE — ANESTHESIA PREPROCEDURE EVALUATION
Anesthesia Pre-Procedure Evaluation    Patient: Yanna Ordonez   MRN: [id number] : 1975        Preoperative Diagnosis: Gastroesophageal reflux disease with esophagitis without hemorrhage [K21.00]  Hiatal hernia [K44.9]   Procedure : Procedure(s):  FUNDOPLICATION, NISSEN, LAPAROSCOPIC     Past Medical History:   Diagnosis Date     Personal history of other medical treatment (CODE)     Childbirth      Past Surgical History:   Procedure Laterality Date     DILATION AND CURETTAGE      ,after SAB     HYSTERECTOMY VAGINAL N/A 2018    Procedure: HYSTERECTOMY VAGINAL;  Total Vaginal Hysterectomy and Bilateral Salpingectomy, Left oophorectomy;  Surgeon: Demarco Verdugo MD;  Location: [facility] OR      No Known Allergies   Social History     Tobacco Use     Smoking status: Former Smoker     Packs/day: 0.50     Types: Cigarettes     Quit date: 10/1/2015     Years since quittin.5     Smokeless tobacco: Never Used     Tobacco comment: Quit smoking: Had quit for 6 years and started again about a month ago   Substance Use Topics     Alcohol use: No     Alcohol/week: 1.7 standard drinks      Wt Readings from Last 1 Encounters:   21 99.9 kg (220 lb 3.2 oz)        Anesthesia Evaluation            ROS/MED HX  ENT/Pulmonary:  - neg pulmonary ROS     Neurologic:  - neg neurologic ROS     Cardiovascular:     (+) hypertension-----    METS/Exercise Tolerance: >4 METS    Hematologic:  - neg hematologic  ROS     Musculoskeletal:  - neg musculoskeletal ROS     GI/Hepatic:     (+) GERD, hiatal hernia,     Renal/Genitourinary:  - neg Renal ROS     Endo:     (+) Obesity,     Psychiatric/Substance Use:  - neg psychiatric ROS     Infectious Disease:  - neg infectious disease ROS     Malignancy:  - neg malignancy ROS     Other:  - neg other ROS          Physical Exam    Airway        Mallampati: II   TM distance: > 3 FB   Neck ROM: full   Mouth opening: > 3 cm    Respiratory Devices and Support         Dental  no notable  dental history         Cardiovascular   cardiovascular exam normal       Rhythm and rate: regular and normal     Pulmonary   pulmonary exam normal        breath sounds clear to auscultation           OUTSIDE LABS:  CBC:   Lab Results   Component Value Date    WBC 8.9 08/09/2018    WBC 5.2 08/08/2018    HGB 10.9 (L) 08/09/2018    HGB 12.9 08/08/2018    HCT 33.0 (L) 08/09/2018    HCT 38.6 08/08/2018     08/09/2018     08/08/2018     BMP:   Lab Results   Component Value Date     02/04/2021     07/31/2018    POTASSIUM 4.1 02/04/2021    POTASSIUM 3.8 07/31/2018    CHLORIDE 105 02/04/2021    CHLORIDE 101 07/31/2018    CO2 27 02/04/2021    CO2 25 07/31/2018    BUN 14 02/04/2021    BUN 17 07/31/2018    CR 0.84 02/04/2021    CR 0.72 08/09/2018     (H) 02/04/2021    GLC 96 07/31/2018     COAGS: No results found for: PTT, INR, FIBR  POC:   Lab Results   Component Value Date    HCG Negative 08/08/2018     HEPATIC:   Lab Results   Component Value Date    ALBUMIN 4.8 02/04/2021    PROTTOTAL 7.6 02/04/2021    ALT 19 02/04/2021    AST 14 02/04/2021    ALKPHOS 51 02/04/2021    BILITOTAL 0.4 02/04/2021     OTHER:   Lab Results   Component Value Date    A1C 5.5 03/15/2021    DEBBIE 9.7 02/04/2021    SED 15 08/31/2016       Anesthesia Plan    ASA Status:  2   NPO Status:  NPO Appropriate    Anesthesia Type: General.     - Airway: ETT   Induction: Intravenous.   Maintenance: Inhalation.        Consents    Anesthesia Plan(s) and associated risks, benefits, and realistic alternatives discussed. Questions answered and patient/representative(s) expressed understanding.     - Discussed with:  Patient         Postoperative Care       PONV prophylaxis: Ondansetron (or other 5HT-3), Dexamethasone or Solumedrol     Comments:                David Kellerman, APRN CRNA

## 2021-04-22 VITALS
SYSTOLIC BLOOD PRESSURE: 134 MMHG | DIASTOLIC BLOOD PRESSURE: 73 MMHG | TEMPERATURE: 98.3 F | OXYGEN SATURATION: 95 % | RESPIRATION RATE: 16 BRPM | WEIGHT: 218.8 LBS | HEART RATE: 71 BPM | BODY MASS INDEX: 36.41 KG/M2

## 2021-04-22 LAB
BASOPHILS # BLD AUTO: 0 10E9/L (ref 0–0.2)
BASOPHILS NFR BLD AUTO: 0.2 %
DIFFERENTIAL METHOD BLD: ABNORMAL
EOSINOPHIL # BLD AUTO: 0 10E9/L (ref 0–0.7)
EOSINOPHIL NFR BLD AUTO: 0.1 %
ERYTHROCYTE [DISTWIDTH] IN BLOOD BY AUTOMATED COUNT: 13.4 % (ref 10–15)
HCT VFR BLD AUTO: 35.9 % (ref 35–47)
HGB BLD-MCNC: 11.6 G/DL (ref 11.7–15.7)
IMM GRANULOCYTES # BLD: 0 10E9/L (ref 0–0.4)
IMM GRANULOCYTES NFR BLD: 0.2 %
LYMPHOCYTES # BLD AUTO: 1.9 10E9/L (ref 0.8–5.3)
LYMPHOCYTES NFR BLD AUTO: 21.5 %
MCH RBC QN AUTO: 27.4 PG (ref 26.5–33)
MCHC RBC AUTO-ENTMCNC: 32.3 G/DL (ref 31.5–36.5)
MCV RBC AUTO: 85 FL (ref 78–100)
MONOCYTES # BLD AUTO: 0.5 10E9/L (ref 0–1.3)
MONOCYTES NFR BLD AUTO: 5.6 %
NEUTROPHILS # BLD AUTO: 6.4 10E9/L (ref 1.6–8.3)
NEUTROPHILS NFR BLD AUTO: 72.4 %
PLATELET # BLD AUTO: 260 10E9/L (ref 150–450)
RBC # BLD AUTO: 4.24 10E12/L (ref 3.8–5.2)
WBC # BLD AUTO: 8.9 10E9/L (ref 4–11)

## 2021-04-22 PROCEDURE — 85025 COMPLETE CBC W/AUTO DIFF WBC: CPT | Performed by: SURGERY

## 2021-04-22 PROCEDURE — 36415 COLL VENOUS BLD VENIPUNCTURE: CPT | Performed by: SURGERY

## 2021-04-22 PROCEDURE — 250N000011 HC RX IP 250 OP 636: Performed by: SURGERY

## 2021-04-22 PROCEDURE — 258N000003 HC RX IP 258 OP 636: Performed by: SURGERY

## 2021-04-22 PROCEDURE — 250N000013 HC RX MED GY IP 250 OP 250 PS 637: Performed by: SURGERY

## 2021-04-22 RX ORDER — ONDANSETRON 4 MG/1
4 TABLET, ORALLY DISINTEGRATING ORAL EVERY 8 HOURS PRN
Qty: 20 TABLET | Refills: 0 | Status: SHIPPED | OUTPATIENT
Start: 2021-04-22 | End: 2021-05-03

## 2021-04-22 RX ORDER — HYDROCODONE BITARTRATE AND ACETAMINOPHEN 7.5; 325 MG/15ML; MG/15ML
10 SOLUTION ORAL EVERY 4 HOURS PRN
Qty: 118 ML | Refills: 0 | Status: SHIPPED | OUTPATIENT
Start: 2021-04-22 | End: 2022-06-28

## 2021-04-22 RX ADMIN — SODIUM CHLORIDE, POTASSIUM CHLORIDE, SODIUM LACTATE AND CALCIUM CHLORIDE: 600; 310; 30; 20 INJECTION, SOLUTION INTRAVENOUS at 03:36

## 2021-04-22 RX ADMIN — ONDANSETRON 4 MG: 2 INJECTION INTRAMUSCULAR; INTRAVENOUS at 00:10

## 2021-04-22 RX ADMIN — HYDROCODONE BITARTRATE AND ACETAMINOPHEN 10 ML: 7.5; 325 SOLUTION ORAL at 03:31

## 2021-04-22 RX ADMIN — ENOXAPARIN SODIUM 40 MG: 100 INJECTION SUBCUTANEOUS at 05:09

## 2021-04-22 RX ADMIN — ONDANSETRON 4 MG: 2 INJECTION INTRAMUSCULAR; INTRAVENOUS at 05:09

## 2021-04-22 RX ADMIN — AMLODIPINE BESYLATE 5 MG: 5 TABLET ORAL at 09:20

## 2021-04-22 RX ADMIN — ACETAMINOPHEN 975 MG: 325 TABLET ORAL at 00:09

## 2021-04-22 RX ADMIN — KETOROLAC TROMETHAMINE 30 MG: 30 INJECTION, SOLUTION INTRAMUSCULAR; INTRAVENOUS at 02:05

## 2021-04-22 RX ADMIN — ACETAMINOPHEN 975 MG: 325 TABLET ORAL at 08:32

## 2021-04-22 RX ADMIN — KETOROLAC TROMETHAMINE 30 MG: 30 INJECTION, SOLUTION INTRAMUSCULAR; INTRAVENOUS at 08:33

## 2021-04-22 ASSESSMENT — ACTIVITIES OF DAILY LIVING (ADL)
ADLS_ACUITY_SCORE: 15

## 2021-04-22 NOTE — PROGRESS NOTES
GENERAL SURGERY PROGRESS NOTE  4/22/2021      Interval history:   No acute events overnight.  Pain is well controlled with oral Tylenol and 1 dose of liquid hydrocodone.  Patient is tolerating clear liquid diet, denies nausea or bloating.  She is not passing flatus.  She says she has had a few small burps.  Patient is ambulating independently and voiding per self.  Denies fever, shortness of breath.    Physical Exam:   Vital signs are reviewed and there are no significant abnormalities.     UOP over past 24 hours is 2200 mL    General: Lying in bed, appears comfortable  Lungs: Clear breath sounds bilaterally, no increased work of breathing  CV: Regular rate and rhythm, no murmur  Abdomen: Soft, mildly distended.  Minimal tenderness around incisions.  Laparoscopic port sites x5 are clean, dry, intact with no surrounding erythema or induration  MSK: Normal bulk and tone, trace bilateral lower extremity edema   Neuro: alert and oriented, normal speech mentation    No new labs     No new imaging       Assessment / Plan:   Yanna Ordonez is a 45 year old female who is postop day 1 from laparoscopic repair of hiatal hernia and Nissen fundoplication.  Patient is hemodynamically stable and doing well.    Advance to soft diet    -Patient is given educational material for postoperative Nissen diet  Saline lock IV fluids  Okay to discharge later this morning when tolerating soft diet      TIMA Ferguson MD   4/22/2021

## 2021-04-22 NOTE — PLAN OF CARE
Pt is A&Ox4. POD 1 following nissen procedure. Lap sites CDI. BS rare. Up with steady gait. No appetite but tried oatmeal, tolerated well without nausea. Pain controlled with current regimen. Discharge order in place, pt verbalizes readiness and understanding for plan of care.

## 2021-04-22 NOTE — PHARMACY - DISCHARGE MEDICATION RECONCILIATION AND EDUCATION
Pharmacy:  Discharge Counseling and Medication Reconciliation    Yanna Ordonez  [address]  [facility] [address]  [phone] (home)   45 year old female  PCP: Scarlett Montaño    Allergies: Patient has no known allergies.    Discharge Counseling:    Pharmacist met with patient today to review the medication portion of the After Visit Summary (with an emphasis on NEW medications) and to address patient's questions/concerns.    Summary of Education: Reviewed hydrocodone/acetaminophen and ondansetron odt verbally and in print  Reviewed stopping famotidine and pantoprazole    Materials Provided:  MedCounselor sheets printed from Clinical Pharmacology on: hydrocodone/acetaminophen and ondansetron odt    Discharge Medication Reconciliation:    It has been determined that the patient has an adequate supply of medications available or which can be obtained from the patient's preferred pharmacy, which she has confirmed as: [facility]. An updated medication list will be faxed to the patient's pharmacy.    Thank you for the consult.    Helen Roman [facility]........April 22, 2021 9:15 AM

## 2021-04-22 NOTE — PROGRESS NOTES
Discharge Note      Data:  Yanna Ordonez discharged to home at 1045 via wheel chair. Accompanied by staff.    Action:  Written discharge/follow-up instructions were provided to patient. Prescriptions sent to patients preferred pharmacy. All belongings sent with patient.    Response:  Pt verbalized understanding of discharge instructions, reason for discharge, and necessary follow-up appointments.

## 2021-04-22 NOTE — PLAN OF CARE
Patient admitted POD #1 following Nissen fundoplication. VSS and WNL. Low grade temp at start of shift, resolved with Tylenol and Toradol. X5 lap sites to abdomen WNL, mild bruising and redness to lap sites. Active bowel sounds, unable to pass gas but belching. Voiding without difficulty. Pain controlled with scheduled Tylenol and Toradol, ice applied to abdomen. Up with SBA. Tolerating clear liquids, denies N/V. Will continue to monitor. Temp: 98.7  F (37.1  C) Temp src: Tympanic BP: (!) 147/75 Pulse: 62   Resp: 16 SpO2: 94 % O2 Device: None (Room air)        Mireya Huizar RN on 4/22/2021 at 5:33 AM

## 2021-04-22 NOTE — PROGRESS NOTES
SAFETY CHECKLIST  ID Bands and Risk clasps correct and in place (DNR, Fall risk, Allergy, Latex, Limb):  Yes  All Lines Reconciled and labeled correctly: Yes  Whiteboard updated:Yes  Environmental interventions (bed/chair alarm on, call light, side rails, restraints, sitter....): Yes  Verify Tele #:      no vomiting/no chest pain, no headache, no trouble breathing, no urinary incontinence , no fall, no injury/no fever/no loss of consciousness

## 2021-04-22 NOTE — PHARMACY - DISCHARGE MEDICATION RECONCILIATION AND EDUCATION
[facility]  Part of [facility]  [address] [place] [address]    April 22, 2021    Dear Pharmacist,    Your customer, Yanna Alegriazhang, born on 1975, was recently discharged from [facility].  We have updated her medication list and want to alert you to the following:       Review of your medicines      START taking      Dose / Directions   HYDROcodone-acetaminophen 7.5-325 MG/15ML solution  Used for: Hiatal hernia, Postoperative state      Dose: 10 mL  Take 10 mLs by mouth every 4 hours as needed for moderate to severe pain  Quantity: 118 mL  Refills: 0     ondansetron 4 MG ODT tab  Commonly known as: ZOFRAN-ODT  Used for: Hiatal hernia, Postoperative state      Dose: 4 mg  Take 1 tablet (4 mg) by mouth every 8 hours as needed for nausea  Quantity: 20 tablet  Refills: 0        CONTINUE these medicines which have NOT CHANGED      Dose / Directions   amLODIPine 5 MG tablet  Commonly known as: NORVASC  Used for: Essential hypertension      Dose: 5 mg  Take 1 tablet (5 mg) by mouth daily  Quantity: 30 tablet  Refills: 0     lisinopril 40 MG tablet  Commonly known as: ZESTRIL  Used for: Essential hypertension      Dose: 40 mg  Take 1 tablet (40 mg) by mouth daily  Quantity: 30 tablet  Refills: 0     multivitamin w/minerals tablet      Dose: 1 tablet  Take 1 tablet by mouth daily  Refills: 0        STOP taking    famotidine 20 MG tablet  Commonly known as: PEPCID        pantoprazole 40 MG EC tablet  Commonly known as: PROTONIX              Where to get your medicines      These medications were sent to [facility] [address]   [address] , [place] [address]    Phone: [phone]     HYDROcodone-acetaminophen 7.5-325 MG/15ML solution    ondansetron 4 MG ODT tab         We also reviewed Yanna Ordonez's allergy list and updated it as needed:  Allergies: Patient has no known allergies.    Thank you  for continuing to care for Yanna Ordonez.  We look forward to working together with you in the future.    Sincerely,  Helen Roman, [facility]

## 2021-04-23 ENCOUNTER — PATIENT OUTREACH (OUTPATIENT)
Dept: INTERNAL MEDICINE | Facility: OTHER | Age: 46
End: 2021-04-23

## 2021-04-23 NOTE — TELEPHONE ENCOUNTER
Transitional Care Management Phone Call    Chart reviewed. Surgical follow up only. No hospital follow-up with primary, just a blood pressure check. No TCM call needed.   Lizzy Gallardo RN on 4/23/2021 at 3:22 PM

## 2021-04-26 ENCOUNTER — MYC MEDICAL ADVICE (OUTPATIENT)
Dept: SURGERY | Facility: OTHER | Age: 46
End: 2021-04-26

## 2021-04-26 NOTE — TELEPHONE ENCOUNTER
Left chest pain could be due to reactive fluid in the chest or pneumothorax, I will order a chest X ray to evaluate this area. It could be also be diaphragmatic irritation from the hiatal hernia repair referring to your chest. The weight loss is concerning for dehydration. Are you getting enough water? Weight loss is normal after this surgery, but to lose 15lbs so quickly is not normal, unless becoming dehydrated. If you feel good and have energy the weight loss is probably nothing to worry about.   Follow up in clinic today for further evaluation today if possible.

## 2021-04-26 NOTE — TELEPHONE ENCOUNTER
Patient notified of providers response.  She states that she is not feeling all that well as she has been having pain with breathing and shortness of breath.  She will see Jesus Campos MD tomorrow to have this evaluated.  Laura Vyas LPN........................4/26/2021  4:32 PM

## 2021-04-27 ENCOUNTER — OFFICE VISIT (OUTPATIENT)
Dept: SURGERY | Facility: OTHER | Age: 46
End: 2021-04-27
Attending: SURGERY
Payer: COMMERCIAL

## 2021-04-27 ENCOUNTER — HOSPITAL ENCOUNTER (OUTPATIENT)
Dept: GENERAL RADIOLOGY | Facility: OTHER | Age: 46
End: 2021-04-27
Attending: SURGERY
Payer: COMMERCIAL

## 2021-04-27 VITALS
SYSTOLIC BLOOD PRESSURE: 138 MMHG | TEMPERATURE: 98.8 F | DIASTOLIC BLOOD PRESSURE: 72 MMHG | BODY MASS INDEX: 35.11 KG/M2 | HEART RATE: 80 BPM | WEIGHT: 211 LBS | OXYGEN SATURATION: 96 % | RESPIRATION RATE: 32 BRPM

## 2021-04-27 DIAGNOSIS — Z98.890 S/P NISSEN FUNDOPLICATION (WITHOUT GASTROSTOMY TUBE) PROCEDURE: ICD-10-CM

## 2021-04-27 DIAGNOSIS — Z98.890 S/P NISSEN FUNDOPLICATION (WITHOUT GASTROSTOMY TUBE) PROCEDURE: Primary | ICD-10-CM

## 2021-04-27 PROCEDURE — 99024 POSTOP FOLLOW-UP VISIT: CPT | Performed by: SURGERY

## 2021-04-27 PROCEDURE — 71046 X-RAY EXAM CHEST 2 VIEWS: CPT

## 2021-04-27 ASSESSMENT — PAIN SCALES - GENERAL: PAINLEVEL: EXTREME PAIN (8)

## 2021-04-27 NOTE — NURSING NOTE
"Chief Complaint   Patient presents with     Surgical Followup     chest pain/ shoulder pain/ shortness of breath       Initial BP (!) 132/90 (BP Location: Right arm, Patient Position: Sitting, Cuff Size: Adult Regular)   Pulse 80   Temp 98.8  F (37.1  C) (Tympanic)   Resp (!) 32   Wt 95.7 kg (211 lb)   LMP 06/02/2018   SpO2 96%   Breastfeeding No   BMI 35.11 kg/m   Estimated body mass index is 35.11 kg/m  as calculated from the following:    Height as of 3/15/21: 1.651 m (5' 5\").    Weight as of this encounter: 95.7 kg (211 lb).  Medication Reconciliation: Completed     Laura Vyas LPN  "

## 2021-04-27 NOTE — PROGRESS NOTES
Patient presents for post surgical visit after lap hiatal hernia repair on 4/21. Patient has done well. No problems with incisions. No bowel movement for a few days and then diarrhea. Notes left shoulder/chest pain present since surgery. No fevers or chills. No SOB. No dysphagia.     /72 (BP Location: Right arm, Patient Position: Sitting, Cuff Size: Adult Regular)   Pulse 80   Temp 98.8  F (37.1  C) (Tympanic)   Resp (!) 32   Wt 95.7 kg (211 lb)   LMP 06/02/2018   SpO2 96%   Breastfeeding No   BMI 35.11 kg/m      General: NAD, pleasant and cooperative with exam and interview.  Abdomen: healing incisions. No sign of infection. No pain with palpation.  Psychiatry: awake, alert and oriented. Appropriate affect.    Assessment/Plan:  45 year old female with left chest and shoulder pain status post lap vent hiatal hernia and Nissen fundoplication.  Chest x-ray shows no pleural effusion and no hemothorax.  The left diaphragm is elevated.  No comparison available.  Patient also has some degree of postoperative constipation with overflow diarrhea.    -Offered CT scan for further delineation of elevated left hemidiaphragm.  Patient deferred for now.    -Recommend MiraLAX for the next 2 to 3 days for further cleanout.  Then start fiber.    -Follow-up with Dr. Pippa Campos MD on 4/27/2021 at 10:42 AM

## 2021-04-29 NOTE — DISCHARGE SUMMARY
[facility] Discharge Summary    Yanna Ordonez MRN# [id number]   Age: 45 year old YOB: 1975     Date of Admission:  4/21/2021  Date of Discharge::  4/22/2021 10:53 AM  Admitting Physician:  Damien Ferguson MD  Discharge Physician:  Damien Ferguson MD     Home clinic: GI          Admission Diagnoses:   Gastroesophageal reflux disease with esophagitis without hemorrhage [K21.00]  Hiatal hernia [K44.9]  Hiatal hernia          Discharge Diagnosis:     Gastro-esophageal reflux  Hiatal hernia          Procedures:     Procedure(s):  Laparoscopic repair of hiatal hernia and Nissen fundoplication                  Medications Prior to Admission:     No medications prior to admission.             Discharge Medications:     Discharge Medication List as of 4/22/2021 10:08 AM      START taking these medications    Details   HYDROcodone-acetaminophen 7.5-325 MG/15ML solution Take 10 mLs by mouth every 4 hours as needed for moderate to severe pain, Disp-118 mL, R-0, E-Prescribe      ondansetron (ZOFRAN-ODT) 4 MG ODT tab Take 1 tablet (4 mg) by mouth every 8 hours as needed for nausea, Disp-20 tablet, R-0, E-Prescribe         CONTINUE these medications which have NOT CHANGED    Details   amLODIPine (NORVASC) 5 MG tablet Take 1 tablet (5 mg) by mouth daily, Disp-30 tablet, R-0, E-Prescribe      lisinopril (ZESTRIL) 40 MG tablet Take 1 tablet (40 mg) by mouth daily, Disp-30 tablet, R-0, E-Prescribe      multivitamin w/minerals (THERA-VIT-M) tablet Take 1 tablet by mouth daily, Historical         STOP taking these medications       famotidine (PEPCID) 20 MG tablet Comments:   Reason for Stopping:         pantoprazole (PROTONIX) 40 MG EC tablet Comments:   Reason for Stopping:                     Consultations:   No consultations were requested during this admission          Brief History of Illness:   Reason for admission requiring a surgical or invasive procedure:    Gastroesophageal reflux disease with esophagitis without hemorrhage [K21.00]  Hiatal hernia [K44.9]   The patient underwent the following procedure(s):   Laparoscopic repair of hiatal hernia and Nissen fundoplication   There were no immediate complications during this procedure.    Please refer to the full operative summary for details.             Hospital Course:   The patient's hospital course was unremarkable.  She recovered as anticipated and experienced no post-operative complications.  She was discharged on postoperative day 1 on a soft diet.          Discharge Instructions and Follow-Up:     Discharge diet: Soft   Discharge activity: No lifting, driving, or strenuous exercise for 4 week(s)   Discharge follow-up:  Follow-up with Dr. Ferguson in 2 weeks   Wound care: May get incision wet in shower but do not soak or scrub           Discharge Disposition:     Discharged to home      Attestation:  I have reviewed today's vital signs, notes, medications, labs and imaging.  Amount of time performed on this discharge summary: 15 minutes.    Damien Ferguson MD

## 2021-05-03 ENCOUNTER — OFFICE VISIT (OUTPATIENT)
Dept: SURGERY | Facility: OTHER | Age: 46
End: 2021-05-03
Attending: SURGERY
Payer: COMMERCIAL

## 2021-05-03 VITALS
DIASTOLIC BLOOD PRESSURE: 82 MMHG | BODY MASS INDEX: 34.7 KG/M2 | OXYGEN SATURATION: 98 % | SYSTOLIC BLOOD PRESSURE: 122 MMHG | TEMPERATURE: 98.1 F | RESPIRATION RATE: 16 BRPM | WEIGHT: 208.5 LBS | HEART RATE: 82 BPM

## 2021-05-03 DIAGNOSIS — Z98.890 S/P NISSEN FUNDOPLICATION (WITHOUT GASTROSTOMY TUBE) PROCEDURE: Primary | ICD-10-CM

## 2021-05-03 DIAGNOSIS — Z98.890 POSTOPERATIVE STATE: ICD-10-CM

## 2021-05-03 PROCEDURE — 99024 POSTOP FOLLOW-UP VISIT: CPT | Performed by: SURGERY

## 2021-05-03 ASSESSMENT — PAIN SCALES - GENERAL: PAINLEVEL: NO PAIN (0)

## 2021-05-03 NOTE — NURSING NOTE
"Patient presents to the clinic today for post op nissen fundoplication.  Carolyn Eaton LPN 5/3/2021   11:43 AM    Chief Complaint   Patient presents with     Surgical Followup       Initial /82 (BP Location: Right arm, Patient Position: Sitting, Cuff Size: Adult Regular)   Pulse 82   Temp 98.1  F (36.7  C) (Tympanic)   Resp 16   Wt 94.6 kg (208 lb 8 oz)   LMP 06/02/2018   SpO2 98%   Breastfeeding No   BMI 34.70 kg/m   Estimated body mass index is 34.7 kg/m  as calculated from the following:    Height as of 3/15/21: 1.651 m (5' 5\").    Weight as of this encounter: 94.6 kg (208 lb 8 oz).  Medication Reconciliation: complete  Carolyn Eaton LPN    "

## 2021-05-03 NOTE — PROGRESS NOTES
Yanna Ordonez presents to clinic for follow-up after undergoing laparoscopic repair of hiatal hernia and Nissen fundoplication.  Patient states she is doing well today.  She was seen in the office less than 1 week after surgery for right chest pain.  She states that this is improved.  She is tolerating a soft diet and had some ground beef yesterday that she swallowed with no problems.  She denies food getting stuck or slowing in her esophagus.  She denies nausea or vomiting.  She has had some mild to moderate bloating at times but this even seems to be improving.  She is not burping.  She does get headache up from time to time.  She needs to take stool softeners noted to have soft, easy to pass bowel movements.  But she notes she is not having to strain at stool.  Denies problems the incisions.  States she is washing them daily with surgical soap.      /82 (BP Location: Right arm, Patient Position: Sitting, Cuff Size: Adult Regular)   Pulse 82   Temp 98.1  F (36.7  C) (Tympanic)   Resp 16   Wt 94.6 kg (208 lb 8 oz)   LMP 06/02/2018   SpO2 98%   Breastfeeding No   BMI 34.70 kg/m      Patient is sitting up in the chair, appears comfortable  No increased work of breathing  Abdomen is soft, nontender, nondistended.  Incisions x5 are clean, dry, intact, surgical glue remains intact on most of the incisions.  No surrounding erythema or induration.  Alert and oriented, normal speech and mentation      Yanna Ordonez is a 45-year-old female status post laparoscopic repair of hiatal hernia Nissen fundoplication.  Patient is doing well and recovering as expected.  No evidence of surgical complication.     Okay to advance to regular diet.  Patient was cautioned specifically about breads and nonground meats.  She was advised to take small bites, chew thoroughly and take with plenty of water.  Patient is cleared to go back to work tomorrow  Follow-up in clinic with concerns        Damien Ferguson,  MD

## 2021-05-03 NOTE — LETTER
[facility]  [address]  [place] [address]  [phone]          May 3, 2021    RE:  Yanna KYRIE Ordonez                                                                                                                                                       [address] [facility] [address]            To whom it may concern:    Yanna Ordonez is under my professional care for surgery. She  may return to work with the following: The employee is ABLE to return to work tomorrow, 05/04/21, with no physical restrictions.       Sincerely,        Damien Ferguson MD

## 2021-05-05 ENCOUNTER — OFFICE VISIT (OUTPATIENT)
Dept: FAMILY MEDICINE | Facility: OTHER | Age: 46
End: 2021-05-05
Attending: FAMILY MEDICINE
Payer: COMMERCIAL

## 2021-05-05 VITALS
WEIGHT: 207.6 LBS | DIASTOLIC BLOOD PRESSURE: 70 MMHG | HEART RATE: 83 BPM | RESPIRATION RATE: 16 BRPM | BODY MASS INDEX: 34.55 KG/M2 | TEMPERATURE: 98.3 F | SYSTOLIC BLOOD PRESSURE: 124 MMHG

## 2021-05-05 DIAGNOSIS — I10 ESSENTIAL HYPERTENSION: ICD-10-CM

## 2021-05-05 PROCEDURE — 99213 OFFICE O/P EST LOW 20 MIN: CPT | Performed by: FAMILY MEDICINE

## 2021-05-05 RX ORDER — LISINOPRIL 40 MG/1
40 TABLET ORAL DAILY
Qty: 90 TABLET | Refills: 3 | Status: SHIPPED | OUTPATIENT
Start: 2021-05-05 | End: 2022-06-02

## 2021-05-05 RX ORDER — AMLODIPINE BESYLATE 5 MG/1
5 TABLET ORAL DAILY
Qty: 90 TABLET | Refills: 3 | Status: SHIPPED | OUTPATIENT
Start: 2021-05-05 | End: 2022-06-02

## 2021-05-05 ASSESSMENT — PAIN SCALES - GENERAL: PAINLEVEL: MILD PAIN (2)

## 2021-05-05 NOTE — PROGRESS NOTES
Nursing Notes:   Radha Cabrera LPN  5/5/2021  1:17 PM  Signed  Chief Complaint   Patient presents with     RECHECK     Here today for recheck of BP. Has a log with her. Also had surgery 2 weeks ago. Feeling well.     Medication Reconciliation: complete    Radha Cabrera LPN       SUBJECTIVE:  HPI: Yanna Ordonez is a 45 year old female here for recheck of her essential hypertension.     HTN:  Patient has been compliant with 40 mg of lisinopril, 5mg amlodipine in AM. Blood pressures have been 110's-120s/70s at home. Denies SE such as cough lightheadedness or dizziness. Due for refills.    L hip pain resolved.  GERD symptoms have resolved since her surgery 2 weeks ago.    Allergies:  No Known Allergies    ROS:  Snoring. Constitutional, HEENT, cardiovascular, pulmonary, GI and  systems are negative, except as otherwise noted.    Past medical, surgical, and family history reviewed and updated as appropriate in the chart.  Relevant social history listed in HPI.    OBJECTIVE:  /70 (BP Location: Right arm, Patient Position: Sitting, Cuff Size: Adult Regular)   Pulse 83   Temp 98.3  F (36.8  C) (Tympanic)   Resp 16   Wt 94.2 kg (207 lb 9.6 oz)   LMP 06/02/2018   Breastfeeding No   BMI 34.55 kg/m      BP Readings from Last 6 Encounters:   05/05/21 124/70   05/03/21 122/82   04/27/21 138/72   04/22/21 134/73   04/05/21 132/80   03/22/21 (!) 134/90     Wt Readings from Last 4 Encounters:   05/05/21 94.2 kg (207 lb 9.6 oz)   05/03/21 94.6 kg (208 lb 8 oz)   04/27/21 95.7 kg (211 lb)   04/22/21 99.2 kg (218 lb 12.8 oz)     EXAM:  CONSTITUTIONAL:  Alert, cooperative, NAD.  EYES: No scleral icterus. Conjunctiva clear.  RESPIRATORY: no increased work of breathing  INTEGUMENTARY:  Warm, dry.  No rash noted on exposed skin.  NEUROLOGIC: Facies symmetric.  Grossly normal movement and tone.  No tremor.  PSYCHIATRIC: Affect normal.  Speech fluent.  Though content linear.    ASSESSMENT/PLAN:   1. Essential  hypertension  Comment: well controlled  Plan: 1 year refill provided. Due for labs annually.   - lisinopril (ZESTRIL) 40 MG tablet; Take 1 tablet (40 mg) by mouth daily  Dispense: 90 tablet; Refill: 3  - amLODIPine (NORVASC) 5 MG tablet; Take 1 tablet (5 mg) by mouth daily  Dispense: 90 tablet; Refill: 3    2. Body mass index is 34.55 kg/m .  -congratulated on slight decrease in weight  -continue lifestyle modifications       Scarlett Montaño MD  [facility]

## 2021-05-05 NOTE — NURSING NOTE
Chief Complaint   Patient presents with     RECHECK     Here today for recheck of BP. Has a log with her. Also had surgery 2 weeks ago. Feeling well.     Medication Reconciliation: complete    Radha Cabrera LPN

## 2021-07-26 ENCOUNTER — APPOINTMENT (OUTPATIENT)
Dept: LAB | Facility: OTHER | Age: 46
End: 2021-07-26
Attending: FAMILY MEDICINE

## 2021-07-26 ENCOUNTER — LAB REQUISITION (OUTPATIENT)
Dept: LAB | Facility: OTHER | Age: 46
End: 2021-07-26
Payer: COMMERCIAL

## 2021-07-26 LAB — SARS-COV-2 RNA RESP QL NAA+PROBE: POSITIVE

## 2021-07-26 PROCEDURE — U0005 INFEC AGEN DETEC AMPLI PROBE: HCPCS | Performed by: FAMILY MEDICINE

## 2021-10-09 ENCOUNTER — HEALTH MAINTENANCE LETTER (OUTPATIENT)
Age: 46
End: 2021-10-09

## 2021-11-23 ENCOUNTER — LAB REQUISITION (OUTPATIENT)
Dept: LAB | Facility: OTHER | Age: 46
End: 2021-11-23

## 2021-11-23 ENCOUNTER — OFFICE VISIT (OUTPATIENT)
Dept: FAMILY MEDICINE | Facility: OTHER | Age: 46
End: 2021-11-23
Attending: PHYSICIAN ASSISTANT
Payer: COMMERCIAL

## 2021-11-23 VITALS
HEART RATE: 73 BPM | BODY MASS INDEX: 31.96 KG/M2 | HEIGHT: 65 IN | OXYGEN SATURATION: 99 % | TEMPERATURE: 98 F | WEIGHT: 191.8 LBS | RESPIRATION RATE: 14 BRPM | DIASTOLIC BLOOD PRESSURE: 72 MMHG | SYSTOLIC BLOOD PRESSURE: 124 MMHG

## 2021-11-23 DIAGNOSIS — R07.0 THROAT PAIN: Primary | ICD-10-CM

## 2021-11-23 LAB
GROUP A STREP BY PCR: NOT DETECTED
SARS-COV-2 RNA RESP QL NAA+PROBE: NEGATIVE

## 2021-11-23 PROCEDURE — 87651 STREP A DNA AMP PROBE: CPT | Mod: ZL | Performed by: PHYSICIAN ASSISTANT

## 2021-11-23 PROCEDURE — U0005 INFEC AGEN DETEC AMPLI PROBE: HCPCS | Performed by: FAMILY MEDICINE

## 2021-11-23 PROCEDURE — 99213 OFFICE O/P EST LOW 20 MIN: CPT | Performed by: PHYSICIAN ASSISTANT

## 2021-11-23 ASSESSMENT — ANXIETY QUESTIONNAIRES
6. BECOMING EASILY ANNOYED OR IRRITABLE: NOT AT ALL
1. FEELING NERVOUS, ANXIOUS, OR ON EDGE: NOT AT ALL
IF YOU CHECKED OFF ANY PROBLEMS ON THIS QUESTIONNAIRE, HOW DIFFICULT HAVE THESE PROBLEMS MADE IT FOR YOU TO DO YOUR WORK, TAKE CARE OF THINGS AT HOME, OR GET ALONG WITH OTHER PEOPLE: NOT DIFFICULT AT ALL
2. NOT BEING ABLE TO STOP OR CONTROL WORRYING: NOT AT ALL
5. BEING SO RESTLESS THAT IT IS HARD TO SIT STILL: NOT AT ALL
GAD7 TOTAL SCORE: 0
3. WORRYING TOO MUCH ABOUT DIFFERENT THINGS: NOT AT ALL
7. FEELING AFRAID AS IF SOMETHING AWFUL MIGHT HAPPEN: NOT AT ALL

## 2021-11-23 ASSESSMENT — PATIENT HEALTH QUESTIONNAIRE - PHQ9: 5. POOR APPETITE OR OVEREATING: NOT AT ALL

## 2021-11-23 ASSESSMENT — MIFFLIN-ST. JEOR: SCORE: 1500.88

## 2021-11-23 ASSESSMENT — PAIN SCALES - GENERAL: PAINLEVEL: MODERATE PAIN (4)

## 2021-11-23 NOTE — NURSING NOTE
Patient presents to clinic with sore throat that started 3 days ago.  Charis Ma LPN ....................  11/23/2021   9:59 AM
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
## 2021-11-24 ASSESSMENT — ANXIETY QUESTIONNAIRES: GAD7 TOTAL SCORE: 0

## 2021-11-29 ENCOUNTER — LAB REQUISITION (OUTPATIENT)
Dept: LAB | Facility: OTHER | Age: 46
End: 2021-11-29

## 2021-11-29 DIAGNOSIS — Z11.52 ENCOUNTER FOR SCREENING FOR COVID-19: ICD-10-CM

## 2021-11-29 LAB — SARS-COV-2 RNA RESP QL NAA+PROBE: NEGATIVE

## 2021-11-29 PROCEDURE — U0005 INFEC AGEN DETEC AMPLI PROBE: HCPCS | Performed by: FAMILY MEDICINE

## 2022-01-20 ENCOUNTER — TELEPHONE (OUTPATIENT)
Dept: FAMILY MEDICINE | Facility: OTHER | Age: 47
End: 2022-01-20
Payer: COMMERCIAL

## 2022-01-29 ENCOUNTER — HEALTH MAINTENANCE LETTER (OUTPATIENT)
Age: 47
End: 2022-01-29

## 2022-02-17 ENCOUNTER — APPOINTMENT (OUTPATIENT)
Dept: FAMILY MEDICINE | Facility: OTHER | Age: 47
End: 2022-02-17
Attending: CHIROPRACTOR

## 2022-02-17 ENCOUNTER — APPOINTMENT (OUTPATIENT)
Dept: LAB | Facility: OTHER | Age: 47
End: 2022-02-17
Attending: CHIROPRACTOR

## 2022-02-17 ENCOUNTER — HOSPITAL ENCOUNTER (OUTPATIENT)
Dept: MAMMOGRAPHY | Facility: OTHER | Age: 47
Discharge: HOME | End: 2022-02-17
Attending: NURSE PRACTITIONER | Admitting: NURSE PRACTITIONER
Payer: COMMERCIAL

## 2022-02-17 DIAGNOSIS — Z12.31 VISIT FOR SCREENING MAMMOGRAM: ICD-10-CM

## 2022-02-17 PROCEDURE — 77067 SCR MAMMO BI INCL CAD: CPT

## 2022-02-17 PROCEDURE — 99499 UNLISTED E&M SERVICE: CPT | Performed by: CHIROPRACTOR

## 2022-02-17 PROCEDURE — 93000 ELECTROCARDIOGRAM COMPLETE: CPT | Performed by: INTERNAL MEDICINE

## 2022-03-30 ENCOUNTER — THERAPY VISIT (OUTPATIENT)
Dept: CHIROPRACTIC MEDICINE | Facility: OTHER | Age: 47
End: 2022-03-30
Attending: CHIROPRACTOR
Payer: COMMERCIAL

## 2022-03-30 VITALS
OXYGEN SATURATION: 97 % | RESPIRATION RATE: 16 BRPM | DIASTOLIC BLOOD PRESSURE: 78 MMHG | SYSTOLIC BLOOD PRESSURE: 130 MMHG | HEART RATE: 86 BPM | TEMPERATURE: 98.4 F

## 2022-03-30 DIAGNOSIS — M99.01 SEGMENTAL AND SOMATIC DYSFUNCTION OF CERVICAL REGION: Primary | ICD-10-CM

## 2022-03-30 DIAGNOSIS — M54.2 CERVICALGIA: ICD-10-CM

## 2022-03-30 DIAGNOSIS — M99.04 SEGMENTAL AND SOMATIC DYSFUNCTION OF SACRAL REGION: ICD-10-CM

## 2022-03-30 DIAGNOSIS — M54.2 DORSALGIA OF CERVICAL REGION: ICD-10-CM

## 2022-03-30 DIAGNOSIS — M99.02 SEGMENTAL AND SOMATIC DYSFUNCTION OF THORACIC REGION: ICD-10-CM

## 2022-03-30 DIAGNOSIS — M54.50 BILATERAL LOW BACK PAIN WITHOUT SCIATICA, UNSPECIFIED CHRONICITY: ICD-10-CM

## 2022-03-30 PROCEDURE — 99213 OFFICE O/P EST LOW 20 MIN: CPT | Mod: 25 | Performed by: CHIROPRACTOR

## 2022-03-30 PROCEDURE — 98941 CHIROPRACT MANJ 3-4 REGIONS: CPT | Mod: AT | Performed by: CHIROPRACTOR
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
## 2022-04-06 ENCOUNTER — THERAPY VISIT (OUTPATIENT)
Dept: CHIROPRACTIC MEDICINE | Facility: OTHER | Age: 47
End: 2022-04-06
Attending: CHIROPRACTOR
Payer: COMMERCIAL

## 2022-04-06 VITALS
RESPIRATION RATE: 16 BRPM | OXYGEN SATURATION: 98 % | HEART RATE: 73 BPM | DIASTOLIC BLOOD PRESSURE: 80 MMHG | SYSTOLIC BLOOD PRESSURE: 124 MMHG | TEMPERATURE: 97.6 F

## 2022-04-06 DIAGNOSIS — M99.02 SEGMENTAL AND SOMATIC DYSFUNCTION OF THORACIC REGION: ICD-10-CM

## 2022-04-06 DIAGNOSIS — M99.01 SEGMENTAL AND SOMATIC DYSFUNCTION OF CERVICAL REGION: Primary | ICD-10-CM

## 2022-04-06 DIAGNOSIS — M54.50 BILATERAL LOW BACK PAIN WITHOUT SCIATICA, UNSPECIFIED CHRONICITY: ICD-10-CM

## 2022-04-06 DIAGNOSIS — M54.2 CERVICALGIA: ICD-10-CM

## 2022-04-06 DIAGNOSIS — M54.2 DORSALGIA OF CERVICAL REGION: ICD-10-CM

## 2022-04-06 DIAGNOSIS — M99.04 SEGMENTAL AND SOMATIC DYSFUNCTION OF SACRAL REGION: ICD-10-CM

## 2022-04-06 PROCEDURE — 98941 CHIROPRACT MANJ 3-4 REGIONS: CPT | Mod: AT | Performed by: CHIROPRACTOR

## 2022-04-06 NOTE — PROGRESS NOTES
Neck is occasional catching. 2/10 W24 4/10. Doing stretches and this is definitely helping. Bilateral lower back is intermittent grinding. 3/10 W24 9/10.  Esther Misbahcharlie on 4/6/2022 at 8:23 AM    Reviewed by EW    Visit #:  2/6-8    Subjective:  Yanna Ordonez is a 46 year old female who is seen in f/u up for:        Segmental and somatic dysfunction of cervical region  Segmental and somatic dysfunction of thoracic region  Segmental and somatic dysfunction of sacral region  Cervicalgia  Dorsalgia of cervical region  Bilateral low back pain without sciatica, unspecified chronicity.     Since last visit on 3/30/2022,  Yanna Ordonez reports: Patient very pleased to inform me that she is doing much better since our initial treatment.  States that pain seemed to be quite manageable until yesterday when she began noticing an increase of her mid and lower back pain.  Has also been noticing that her sleeping is improving.  Been doing home exercises as recommended which she seems to be finding good levels of relief with.    (DVPRS) Pain Rating Score : Sometimes distracts me (W24 9/10) (04/06/22 0821)     Objective:  The following was observed:  /80 (BP Location: Right arm, Patient Position: Sitting, Cuff Size: Adult Regular)   Pulse 73   Temp 97.6  F (36.4  C) (Tympanic)   Resp 16   LMP 06/02/2018   SpO2 98%      P: palpatory tenderness Left side C1, right side C7, T4 midline, T10 midline, right PSIS:    A: static palpation demonstrates intersegmental asymmetry , cervical, thoracic, pelvis  R: motion palpation notes restricted motion, C1 , C7 , T4 , T10 and Sacrum   T: Left suboccipitals, lumbar paraspinals and quadratus lumborum bilaterally mild, taut tender fibers apparent of the T-spine paraspinals bilaterally    Segmental spinal dysfunction/restrictions found at:  :  C1 Right rotation restricted and Left lateral flexion restricted  C7 Left rotation restricted, Right lateral flexion restricted and  Extension restriction  T4 Extension restriction  T10 Extension restriction  Sacrum Extension restriction.      Assessment: Patient symptoms are showing good signs of progress.  Plan to follow-up with patient again in 1 week.    Diagnoses:      1. Segmental and somatic dysfunction of cervical region    2. Segmental and somatic dysfunction of thoracic region    3. Segmental and somatic dysfunction of sacral region    4. Cervicalgia    5. Dorsalgia of cervical region    6. Bilateral low back pain without sciatica, unspecified chronicity        Patient's condition:  Patient had restrictions pre-manipulation and Patient symptoms are gradually improving    Treatment effectiveness:  Post manipulation there is better intersegmental movement, Patient claims to feel looser post manipulation and Patients symptoms are getting better      Procedures:  CMT:  14240 Chiropractic manipulative treatment 3-4 regions performed   Cervical: Diversified, C1 , C7 , Supine  Thoracic: Diversified, T4, T10, Prone  Pelvis: Diversified, Sacrum , Side posture    Modalities:  None performed this visit    Therapeutic procedures:  None  See prior note for further details    Response to Treatment  Reduction in symptoms as reported by patient    Prognosis: Excellent    Progress towards Goals: Patient is making progress towards the goal.   Patient will report improved pain by 75%.              Patient will report able to wake without numb hands.              Patient will report decreased headaches by 75%.              Patient will demonstrate an improved ability to complete Activities of Daily Living   as shown by a reported 10-30% reduced score on neck and/or back index.               Patient will demonstrate improved ROM.     Recommendations:    Instructions:continue with home exercises    Follow-up:  Return to care in 1 week.

## 2022-04-20 ENCOUNTER — THERAPY VISIT (OUTPATIENT)
Dept: CHIROPRACTIC MEDICINE | Facility: OTHER | Age: 47
End: 2022-04-20
Payer: COMMERCIAL

## 2022-04-20 VITALS
RESPIRATION RATE: 16 BRPM | OXYGEN SATURATION: 98 % | DIASTOLIC BLOOD PRESSURE: 80 MMHG | SYSTOLIC BLOOD PRESSURE: 122 MMHG | HEART RATE: 66 BPM | TEMPERATURE: 97.8 F

## 2022-04-20 DIAGNOSIS — M54.50 BILATERAL LOW BACK PAIN WITHOUT SCIATICA, UNSPECIFIED CHRONICITY: ICD-10-CM

## 2022-04-20 DIAGNOSIS — M54.2 DORSALGIA OF CERVICAL REGION: ICD-10-CM

## 2022-04-20 DIAGNOSIS — M99.01 SEGMENTAL AND SOMATIC DYSFUNCTION OF CERVICAL REGION: ICD-10-CM

## 2022-04-20 DIAGNOSIS — M99.02 SEGMENTAL AND SOMATIC DYSFUNCTION OF THORACIC REGION: ICD-10-CM

## 2022-04-20 DIAGNOSIS — M99.04 SEGMENTAL AND SOMATIC DYSFUNCTION OF SACRAL REGION: Primary | ICD-10-CM

## 2022-04-20 PROCEDURE — 98941 CHIROPRACT MANJ 3-4 REGIONS: CPT | Mod: AT | Performed by: CHIROPRACTOR

## 2022-04-20 NOTE — PROGRESS NOTES
Neck is constant catching. 2/10 W24 2/10. Doing stretches does seem to help. Bilateral lower back is intermittent pressure. Radiates into right hip. Worse with transitional movements after having been on feet for a long period of time. 0/10 W24 10/10.   Esther Vela on 4/20/2022 at 1:55 PM    Reviewed by EW    Visit #:  3/6-8    Subjective:  Yanna Ordonez is a 46 year old female who is seen in f/u up for:        Segmental and somatic dysfunction of sacral region  Segmental and somatic dysfunction of thoracic region  Segmental and somatic dysfunction of cervical region  Bilateral low back pain without sciatica, unspecified chronicity  Dorsalgia of cervical region.     Since last visit on 4/6/2022,  Yanna Ordonez reports: Following last encounter patient scheduling mistake was made and thus patient was unable to follow-up with our office as originally planned at 1 week interval.  Patient notes that symptoms still overall are doing better.  Jorde of pain comes from after patient has stood for extended period of time and then gone from a transitional motions such as sit to stand.  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  Notes that ergonomics of the workstation are not ideal and she does believe this to be contributory to symptoms.    (DVPRS) Pain Rating Score : No pain (W24 10/10) (04/20/22 1351)     Objective:  The following was observed:  /80 (BP Location: Right arm, Patient Position: Sitting, Cuff Size: Adult Regular)   Pulse 66   Temp 97.8  F (36.6  C) (Tympanic)   Resp 16   LMP 06/02/2018   SpO2 98%      Observed hyper lordosis lumbar spine    P: palpatory tenderness Suboccipital region bilaterally, T3, T12 midline, PSIS bilaterally:    A: static palpation demonstrates intersegmental asymmetry , cervical, thoracic, pelvis  R: motion palpation notes restricted motion, C1 , C2 , T3 , T12  and Sacrum   T: muscle spasm at level(s): Quadratus lumborum,  lumbar paraspinals bilaterally, upper trapezius and suboccipital musculature bilaterally:      Segmental spinal dysfunction/restrictions found at:  :  C1 Left rotation restricted and Right lateral flexion restricted  C2 Left lateral flexion restricted and Extension restriction  T3 Extension restriction  T12 Extension restriction  Sacrum Extension restriction.      Assessment: Symptoms continue to show improvement despite mistake being made and time away from care.  Discussed importance of gluteal activation as well as posterior pelvic tilting in regards to standing for an extended period of time as I believe patient's symptoms are stemming from hyperlordosis of the lumbar spine causing jamming of the facet joints and increasing pain going from a seated to standing position.    Diagnoses:      1. Segmental and somatic dysfunction of sacral region    2. Segmental and somatic dysfunction of thoracic region    3. Segmental and somatic dysfunction of cervical region    4. Bilateral low back pain without sciatica, unspecified chronicity    5. Dorsalgia of cervical region        Patient's condition:  Patient had restrictions pre-manipulation    Treatment effectiveness:  Post manipulation there is better intersegmental movement and Patient claims to feel looser post manipulation      Procedures:  CMT:  58078 Chiropractic manipulative treatment 3-4 regions performed   Cervical: Diversified, C1 , C2, Supine  Thoracic: Diversified, T3, T12, Prone  Pelvis: Diversified, Sacrum , Side posture    Modalities:  None performed this visit    Therapeutic procedures:  Quadratus lumborum stretch, soft tissue mobilization with a tennis ball.  Posterior pelvic tilt    Response to Treatment  Reduction in symptoms as reported by patient    Prognosis: Good    Progress towards Goals: Patient is making progress towards the goal.   Patient will report improved pain by 75%.              Patient will report able to wake without numb  hands.              Patient will report decreased headaches by 75%.              Patient will demonstrate an improved ability to complete Activities of Daily Living   as shown by a reported 10-30% reduced score on neck and/or back index.               Patient will demonstrate improved ROM.   Recommendations:    Instructions:stretch as instructed at visit    Follow-up:  Return to care in 1 week.

## 2022-04-27 ENCOUNTER — THERAPY VISIT (OUTPATIENT)
Dept: CHIROPRACTIC MEDICINE | Facility: OTHER | Age: 47
End: 2022-04-27
Attending: CHIROPRACTOR
Payer: COMMERCIAL

## 2022-04-27 VITALS
HEART RATE: 81 BPM | OXYGEN SATURATION: 98 % | SYSTOLIC BLOOD PRESSURE: 126 MMHG | RESPIRATION RATE: 16 BRPM | TEMPERATURE: 97.8 F | DIASTOLIC BLOOD PRESSURE: 72 MMHG

## 2022-04-27 DIAGNOSIS — M99.04 SEGMENTAL AND SOMATIC DYSFUNCTION OF SACRAL REGION: Primary | ICD-10-CM

## 2022-04-27 DIAGNOSIS — M53.3 SI (SACROILIAC) JOINT DYSFUNCTION: ICD-10-CM

## 2022-04-27 DIAGNOSIS — M54.50 RIGHT-SIDED LOW BACK PAIN WITHOUT SCIATICA, UNSPECIFIED CHRONICITY: ICD-10-CM

## 2022-04-27 PROCEDURE — 98940 CHIROPRACT MANJ 1-2 REGIONS: CPT | Mod: AT | Performed by: CHIROPRACTOR

## 2022-04-27 NOTE — PROGRESS NOTES
Right lower back is feeling frequent pressure. Radiating into right hip.  8/10 W24 8/10.   Esther Vela on 4/27/2022 at 2:06 PM    Reviewed by EW    Visit #:  4/6-8    Subjective:  Yanna Ordonez is a 46 year old female who is seen in f/u up for:        Segmental and somatic dysfunction of sacral region  Right-sided low back pain without sciatica, unspecified chronicity  SI (sacroiliac) joint dysfunction.     Since last visit on 4/20/2022,  Yanna Ordonez reports: Neck and upper back symptoms are showing good signs of progress however low back pain remains relatively unchanged since last encounter.  Patient continues to note that symptoms are worse with standing.    (DVPRS) Pain Rating Score : No pain (W24 0/10) (04/27/22 1406)     Objective:  The following was observed:  /72 (BP Location: Right arm, Patient Position: Sitting, Cuff Size: Adult Regular)   Pulse 81   Temp 97.8  F (36.6  C) (Tympanic)   Resp 16   LMP 06/02/2018   SpO2 98%      P: palpatory tenderness right PSIS:    A: static palpation demonstrates intersegmental asymmetry , pelvis  R: motion palpation notes restricted motion, Sacrum   T: muscle spasm at level(s): right quadratus lumborum:      Segmental spinal dysfunction/restrictions found at:  :  Sacrum Right lateral flexion restricted and Extension restriction.      Assessment: SI joint dysfunction relatively unchanged since start of care.  At this time I am not recommending further chiropractic care as I believe this would provide a little if any help.  Recommended that patient follow-up with primary provider for medical management.  We will follow-up with patient if symptoms acutely exacerbated.  Patient was in agreement with this course of care.    Diagnoses:      1. Segmental and somatic dysfunction of sacral region    2. Right-sided low back pain without sciatica, unspecified chronicity    3. SI (sacroiliac) joint dysfunction        Patient's condition:  Patient had  restrictions pre-manipulation and Patient symptoms are staying the same    Treatment effectiveness:  Post manipulation there is better intersegmental movement and Post manipulation the patient improves and then feels more restricted motion over time      Procedures:  CMT:  24057 Chiropractic manipulative treatment 1-2 regions performed   Pelvis: Diversified, Sacrum , Side posture    Modalities:  None performed this visit    Therapeutic procedures:  None    Response to Treatment  Reduction in symptoms as reported by patient    Prognosis: Good    Progress towards Goals: patient appears to be nearing MMI regarding chiropractic care    Recommendations:    Instructions: follow up with primary provider    Follow-up:  Discharged from care

## 2022-06-28 ENCOUNTER — OFFICE VISIT (OUTPATIENT)
Dept: FAMILY MEDICINE | Facility: OTHER | Age: 47
End: 2022-06-28
Attending: FAMILY MEDICINE
Payer: COMMERCIAL

## 2022-06-28 VITALS
DIASTOLIC BLOOD PRESSURE: 80 MMHG | RESPIRATION RATE: 16 BRPM | OXYGEN SATURATION: 98 % | TEMPERATURE: 97.8 F | HEART RATE: 86 BPM | HEIGHT: 66 IN | SYSTOLIC BLOOD PRESSURE: 130 MMHG | BODY MASS INDEX: 29.41 KG/M2 | WEIGHT: 183 LBS

## 2022-06-28 DIAGNOSIS — Z00.00 HEALTH CARE MAINTENANCE: Primary | ICD-10-CM

## 2022-06-28 DIAGNOSIS — I10 ESSENTIAL HYPERTENSION: ICD-10-CM

## 2022-06-28 DIAGNOSIS — Z12.11 SCREEN FOR COLON CANCER: ICD-10-CM

## 2022-06-28 DIAGNOSIS — K52.9 CHRONIC DIARRHEA: ICD-10-CM

## 2022-06-28 DIAGNOSIS — L40.50 PSORIASIS WITH ARTHROPATHY (H): ICD-10-CM

## 2022-06-28 LAB
ANION GAP SERPL CALCULATED.3IONS-SCNC: 8 MMOL/L (ref 3–14)
BUN SERPL-MCNC: 11 MG/DL (ref 7–25)
CALCIUM SERPL-MCNC: 10.1 MG/DL (ref 8.6–10.3)
CHLORIDE BLD-SCNC: 103 MMOL/L (ref 98–107)
CO2 SERPL-SCNC: 29 MMOL/L (ref 21–31)
CREAT SERPL-MCNC: 0.77 MG/DL (ref 0.6–1.2)
CRP SERPL-MCNC: 4.3 MG/L
ERYTHROCYTE [SEDIMENTATION RATE] IN BLOOD BY WESTERGREN METHOD: 13 MM/HR (ref 0–20)
GFR SERPL CREATININE-BSD FRML MDRD: >90 ML/MIN/1.73M2
GLUCOSE BLD-MCNC: 84 MG/DL (ref 70–105)
POTASSIUM BLD-SCNC: 4.2 MMOL/L (ref 3.5–5.1)
RHEUMATOID FACT SER NEPH-ACNC: <14 IU/ML
SODIUM SERPL-SCNC: 140 MMOL/L (ref 134–144)
URATE SERPL-MCNC: 4.1 MG/DL (ref 4.4–7.6)

## 2022-06-28 PROCEDURE — 86140 C-REACTIVE PROTEIN: CPT | Mod: ZL | Performed by: FAMILY MEDICINE

## 2022-06-28 PROCEDURE — 36415 COLL VENOUS BLD VENIPUNCTURE: CPT | Mod: ZL | Performed by: FAMILY MEDICINE

## 2022-06-28 PROCEDURE — 99396 PREV VISIT EST AGE 40-64: CPT | Performed by: FAMILY MEDICINE

## 2022-06-28 PROCEDURE — 80048 BASIC METABOLIC PNL TOTAL CA: CPT | Mod: ZL | Performed by: FAMILY MEDICINE

## 2022-06-28 PROCEDURE — 84550 ASSAY OF BLOOD/URIC ACID: CPT | Mod: ZL | Performed by: FAMILY MEDICINE

## 2022-06-28 PROCEDURE — 85652 RBC SED RATE AUTOMATED: CPT | Mod: ZL | Performed by: FAMILY MEDICINE

## 2022-06-28 PROCEDURE — 86038 ANTINUCLEAR ANTIBODIES: CPT | Mod: ZL | Performed by: FAMILY MEDICINE

## 2022-06-28 PROCEDURE — 86431 RHEUMATOID FACTOR QUANT: CPT | Mod: ZL | Performed by: FAMILY MEDICINE

## 2022-06-28 PROCEDURE — 86200 CCP ANTIBODY: CPT | Mod: ZL | Performed by: FAMILY MEDICINE

## 2022-06-28 RX ORDER — LISINOPRIL 40 MG/1
40 TABLET ORAL DAILY
Qty: 90 TABLET | Refills: 3 | Status: SHIPPED | OUTPATIENT
Start: 2022-06-28 | End: 2023-07-21

## 2022-06-28 RX ORDER — AMLODIPINE BESYLATE 5 MG/1
5 TABLET ORAL DAILY
Qty: 90 TABLET | Refills: 3 | Status: SHIPPED | OUTPATIENT
Start: 2022-06-28 | End: 2023-07-21

## 2022-06-28 ASSESSMENT — ENCOUNTER SYMPTOMS
HEADACHES: 1
BREAST MASS: 0
PALPITATIONS: 0
MYALGIAS: 1
NAUSEA: 1
EYE PAIN: 0
JOINT SWELLING: 1
HEARTBURN: 0
ARTHRALGIAS: 1
CHILLS: 0
FEVER: 0
COUGH: 0
SHORTNESS OF BREATH: 0
PARESTHESIAS: 0
HEMATURIA: 0
NERVOUS/ANXIOUS: 0
ABDOMINAL PAIN: 0
HEMATOCHEZIA: 0
WEAKNESS: 0
SORE THROAT: 0
FREQUENCY: 0
DYSURIA: 0
CONSTIPATION: 0
DIZZINESS: 0
DIARRHEA: 0

## 2022-06-28 ASSESSMENT — PAIN SCALES - GENERAL: PAINLEVEL: NO PAIN (0)

## 2022-06-28 ASSESSMENT — ANXIETY QUESTIONNAIRES
8. IF YOU CHECKED OFF ANY PROBLEMS, HOW DIFFICULT HAVE THESE MADE IT FOR YOU TO DO YOUR WORK, TAKE CARE OF THINGS AT HOME, OR GET ALONG WITH OTHER PEOPLE?: NOT DIFFICULT AT ALL
GAD7 TOTAL SCORE: 0
5. BEING SO RESTLESS THAT IT IS HARD TO SIT STILL: NOT AT ALL
2. NOT BEING ABLE TO STOP OR CONTROL WORRYING: NOT AT ALL
GAD7 TOTAL SCORE: 0
6. BECOMING EASILY ANNOYED OR IRRITABLE: NOT AT ALL
4. TROUBLE RELAXING: NOT AT ALL
7. FEELING AFRAID AS IF SOMETHING AWFUL MIGHT HAPPEN: NOT AT ALL
3. WORRYING TOO MUCH ABOUT DIFFERENT THINGS: NOT AT ALL
GAD7 TOTAL SCORE: 0
1. FEELING NERVOUS, ANXIOUS, OR ON EDGE: NOT AT ALL
7. FEELING AFRAID AS IF SOMETHING AWFUL MIGHT HAPPEN: NOT AT ALL

## 2022-06-28 ASSESSMENT — PATIENT HEALTH QUESTIONNAIRE - PHQ9
10. IF YOU CHECKED OFF ANY PROBLEMS, HOW DIFFICULT HAVE THESE PROBLEMS MADE IT FOR YOU TO DO YOUR WORK, TAKE CARE OF THINGS AT HOME, OR GET ALONG WITH OTHER PEOPLE: SOMEWHAT DIFFICULT
SUM OF ALL RESPONSES TO PHQ QUESTIONS 1-9: 3
SUM OF ALL RESPONSES TO PHQ QUESTIONS 1-9: 3

## 2022-06-28 NOTE — NURSING NOTE
Chief Complaint   Patient presents with     Establish Care     Here today to [facility] [place] care. Some concerns with rt hip and lower back pain along with arthritis in hands.     Medication Reconciliation: complete    Radha Cabrera LPN

## 2022-06-28 NOTE — PROGRESS NOTES
"Nursing Notes:   Radha Cabrera LPN  6/28/2022  3:41 PM  Signed  Chief Complaint   Patient presents with     [facility]     Here today to est care. Some concerns with rt hip and lower back pain along with arthritis in hands.     Medication Reconciliation: complete    Radha Cabrera LPN          Sameer Raines is a 47 year old, presenting for the following health issues:  Establish Care    Has had Nissen surgery about a year ago.  Since then, has had some issues with constipation alternating with diarrhea.  Has diarrhea about once a week.  Feels like she cannot completely empty her bowels at times.  Sometimes has a lot of noise and gas, then will need to empty her bowels.  No blood or mucous in stools.  No family history of IBD, but her mom has a history of celiac disease.  Dad had a history of polyps.  She has some issues with arthritis in her fingers.  Has had a history of psoriasis as well.  Has had pits in her fingernails.  Her right hip and her SI joints are also affected by her joint pain.  She has not seen a Rheumatologist.  She has a lot of stiffness in her joints.      Healthy Habits:     Getting at least 3 servings of Calcium per day:  NO    Bi-annual eye exam:  Yes    Dental care twice a year:  Yes    Sleep apnea or symptoms of sleep apnea:  None    Diet:  Regular (no restrictions)    Frequency of exercise:  1 day/week    Duration of exercise:  15-30 minutes    Taking medications regularly:  Yes    Medication side effects:  None    PHQ-2 Total Score: 0    Additional concerns today:  No       Est care      Review of Systems   Constitutional, HEENT, cardiovascular, pulmonary, GI, , musculoskeletal, neuro, skin, endocrine and psych systems are negative, except as otherwise noted.      Objective    /80 (BP Location: Right arm, Patient Position: Sitting, Cuff Size: Adult Regular)   Pulse 86   Temp 97.8  F (36.6  C) (Tympanic)   Resp 16   Ht 1.664 m (5' 5.5\")   Wt 83 kg (183 lb)   " LMP 06/02/2018   SpO2 98%   Breastfeeding No   BMI 29.99 kg/m    Body mass index is 29.99 kg/m .  Physical Exam   GENERAL: healthy, alert and no distress  EYES: Eyes grossly normal to inspection, PERRL and conjunctivae and sclerae normal  HENT: ear canals and TM's normal, nose and mouth without ulcers or lesions  NECK: no adenopathy, no asymmetry, masses, or scars and thyroid normal to palpation  RESP: lungs clear to auscultation - no rales, rhonchi or wheezes  BREAST: normal without masses, tenderness or nipple discharge and no palpable axillary masses or adenopathy  CV: regular rate and rhythm, normal S1 S2, no S3 or S4, no murmur, click or rub, no peripheral edema and peripheral pulses strong  ABDOMEN: soft, nontender, no hepatosplenomegaly, no masses and bowel sounds normal  MS: arthritic changes of multiple joints of her fingers.  No dactylitis.  Has pain over SI joints in her lower back.    SKIN: no suspicious lesions.  Patch of psoriatic rash on her left scalp within her hairline.  Pitting and ridging of multiple fingernails.  NEURO: Normal strength and tone, mentation intact and speech normal  PSYCH: mentation appears normal, affect normal/bright    Answers for HPI/ROS submitted by the patient on 6/28/2022  If you checked off any problems, how difficult have these problems made it for you to do your work, take care of things at home, or get along with other people?: Somewhat difficult  PHQ9 TOTAL SCORE: 3  KRUNAL 7 TOTAL SCORE: 0    Assessment & Plan       ICD-10-CM    1. Health care maintenance  Z00.00    2. Essential hypertension  I10 amLODIPine (NORVASC) 5 MG tablet     lisinopril (ZESTRIL) 40 MG tablet     Basic Metabolic Panel     Basic Metabolic Panel   3. Psoriasis with arthropathy (H)  L40.50 Adult Rheumatology  Referral     Sedimentation Rate (ESR)     CRP inflammation     Rheumatoid factor     Cyclic Citrullinated Peptide Antibody IgG     Anti Nuclear [place] IgG by IFA with Reflex     Uric  acid     Sedimentation Rate (ESR)     CRP inflammation     Rheumatoid factor     Cyclic Citrullinated Peptide Antibody IgG     Anti Nuclear [place] IgG by IFA with Reflex     Uric acid   4. Screen for colon cancer  Z12.11 Colonscopy Screening  Referral   5. Chronic diarrhea  K52.9 Colonscopy Screening  Referral     1.  Mammogram up to date, last completed 2/17/22.  Pap Smear is deferred due to prior hysterectomy.  Last was normal in 2018.  Colonoscopy ordered as noted below.  Declines covid booster today.  Tdap is up to date, last completed 8/31/16.  Flu shot up to date.  2.  Blood pressure stable.  Basic Metabolic Profile as above.  Refills as above.  3.  With her underlying psoriasis, suspect that her joint pain reflects underlying psoriatic arthritis.  Referred to Rheumatology, but also obtained labs as above.  4.  Colonoscopy ordered as above.  5.  She has a family history of celiac disease.  Her personal history of psoriasis and probably psoriatic arthritis would make IBD such as ulcerative colitis more likely as well.  Doubt infectious cause at this time.  Referred for colonoscopy as above.        Encouraged weight loss and regular exercise.     No follow-ups on file.    Hali Foster MD  [facility]

## 2022-06-30 LAB
ANA SER QL IF: NEGATIVE
CCP AB SER IA-ACNC: 3 U/ML

## 2022-07-08 ENCOUNTER — TELEPHONE (OUTPATIENT)
Dept: SURGERY | Facility: OTHER | Age: 47
End: 2022-07-08

## 2022-07-08 NOTE — TELEPHONE ENCOUNTER
Ok to schedule for screening colonoscopy, next routine available. Thanks! Denise Lou MD on 7/8/2022 at 12:43 PM

## 2022-07-08 NOTE — TELEPHONE ENCOUNTER
[facility] Diagnostic Referral    Patient has a referral for a colonoscopy with a diagnosis of Screen for colon cancer  Chronic diarrhea.    Please advise. Patient is requesting Dr. Lou    Thank you,Rosaura Kraft on 7/8/2022 at 9:03 AM

## 2022-07-25 DIAGNOSIS — Z12.11 SPECIAL SCREENING FOR MALIGNANT NEOPLASMS, COLON: ICD-10-CM

## 2022-07-25 DIAGNOSIS — Z01.818 PRE-OP TESTING: Primary | ICD-10-CM

## 2022-07-25 NOTE — TELEPHONE ENCOUNTER
Screening Questions for the Scheduling of Screening Colonoscopies   (If Colonoscopy is diagnostic, Provider should review the chart before scheduling.)  Are you younger than 50 or older than 80?  YES  Do you take aspirin or fish oil?  NO (if yes, tell patient to stop 1 week prior to Colonoscopy)  Do you take warfarin (Coumadin), clopidogrel (Plavix), apixaban (Eliquis), dabigatram (Pradaxa), rivaroxaban (Xarelto) or any blood thinner? NO  Do you use oxygen at home?  NO  Do you have kidney disease? NO  Are you on dialysis? NO  Have you had a stroke or heart attack in the last year? NO  Have you had a stent in your heart or any blood vessel in the last year? NO  Have you had a transplant of any organ? NO  Have you had a colonoscopy or upper endoscopy (EGD) before? NO         When?  N/A  Date of scheduled Colonoscopy. 10/12/2022  Provider [facility]  Pharmacy [facility]

## 2022-07-26 RX ORDER — BISACODYL 5 MG
TABLET, DELAYED RELEASE (ENTERIC COATED) ORAL
Qty: 2 TABLET | Refills: 0 | Status: SHIPPED | OUTPATIENT
Start: 2022-07-26 | End: 2023-07-20

## 2022-07-26 RX ORDER — POLYETHYLENE GLYCOL 3350, SODIUM CHLORIDE, SODIUM BICARBONATE, POTASSIUM CHLORIDE 420; 11.2; 5.72; 1.48 G/4L; G/4L; G/4L; G/4L
4000 POWDER, FOR SOLUTION ORAL ONCE
Qty: 4000 ML | Refills: 0 | Status: SHIPPED | OUTPATIENT
Start: 2022-07-26 | End: 2022-07-26

## 2022-09-17 ENCOUNTER — HEALTH MAINTENANCE LETTER (OUTPATIENT)
Age: 47
End: 2022-09-17

## 2022-10-12 ENCOUNTER — ANESTHESIA (OUTPATIENT)
Dept: SURGERY | Facility: OTHER | Age: 47
End: 2022-10-12
Payer: COMMERCIAL

## 2022-10-12 ENCOUNTER — HOSPITAL ENCOUNTER (OUTPATIENT)
Facility: OTHER | Age: 47
Discharge: HOME | End: 2022-10-12
Attending: SURGERY | Admitting: SURGERY
Payer: COMMERCIAL

## 2022-10-12 ENCOUNTER — ANESTHESIA EVENT (OUTPATIENT)
Dept: SURGERY | Facility: OTHER | Age: 47
End: 2022-10-12
Payer: COMMERCIAL

## 2022-10-12 VITALS
TEMPERATURE: 97 F | OXYGEN SATURATION: 99 % | SYSTOLIC BLOOD PRESSURE: 116 MMHG | BODY MASS INDEX: 30.49 KG/M2 | DIASTOLIC BLOOD PRESSURE: 84 MMHG | HEIGHT: 65 IN | RESPIRATION RATE: 16 BRPM | WEIGHT: 183 LBS | HEART RATE: 66 BPM

## 2022-10-12 DIAGNOSIS — K57.30 DIVERTICULOSIS OF COLON WITHOUT DIVERTICULITIS: ICD-10-CM

## 2022-10-12 DIAGNOSIS — K62.1 RECTAL POLYP: Primary | ICD-10-CM

## 2022-10-12 PROCEDURE — 250N000009 HC RX 250: Performed by: NURSE ANESTHETIST, CERTIFIED REGISTERED

## 2022-10-12 PROCEDURE — 88305 TISSUE EXAM BY PATHOLOGIST: CPT

## 2022-10-12 PROCEDURE — 250N000011 HC RX IP 250 OP 636: Performed by: NURSE ANESTHETIST, CERTIFIED REGISTERED

## 2022-10-12 PROCEDURE — 258N000003 HC RX IP 258 OP 636: Performed by: SURGERY

## 2022-10-12 PROCEDURE — 45385 COLONOSCOPY W/LESION REMOVAL: CPT | Performed by: NURSE ANESTHETIST, CERTIFIED REGISTERED

## 2022-10-12 PROCEDURE — 45380 COLONOSCOPY AND BIOPSY: CPT | Performed by: SURGERY

## 2022-10-12 PROCEDURE — 45385 COLONOSCOPY W/LESION REMOVAL: CPT | Mod: PT | Performed by: SURGERY

## 2022-10-12 RX ORDER — FLUMAZENIL 0.1 MG/ML
0.2 INJECTION, SOLUTION INTRAVENOUS
Status: DISCONTINUED | OUTPATIENT
Start: 2022-10-12 | End: 2022-10-12 | Stop reason: HOSPADM

## 2022-10-12 RX ORDER — NALOXONE HYDROCHLORIDE 0.4 MG/ML
0.4 INJECTION, SOLUTION INTRAMUSCULAR; INTRAVENOUS; SUBCUTANEOUS
Status: DISCONTINUED | OUTPATIENT
Start: 2022-10-12 | End: 2022-10-12 | Stop reason: HOSPADM

## 2022-10-12 RX ORDER — LIDOCAINE HYDROCHLORIDE 20 MG/ML
INJECTION, SOLUTION INFILTRATION; PERINEURAL PRN
Status: DISCONTINUED | OUTPATIENT
Start: 2022-10-12 | End: 2022-10-12

## 2022-10-12 RX ORDER — PROPOFOL 10 MG/ML
INJECTION, EMULSION INTRAVENOUS PRN
Status: DISCONTINUED | OUTPATIENT
Start: 2022-10-12 | End: 2022-10-12

## 2022-10-12 RX ORDER — SODIUM CHLORIDE, SODIUM LACTATE, POTASSIUM CHLORIDE, CALCIUM CHLORIDE 600; 310; 30; 20 MG/100ML; MG/100ML; MG/100ML; MG/100ML
INJECTION, SOLUTION INTRAVENOUS CONTINUOUS
Status: DISCONTINUED | OUTPATIENT
Start: 2022-10-12 | End: 2022-10-12 | Stop reason: HOSPADM

## 2022-10-12 RX ORDER — ONDANSETRON 2 MG/ML
4 INJECTION INTRAMUSCULAR; INTRAVENOUS
Status: DISCONTINUED | OUTPATIENT
Start: 2022-10-12 | End: 2022-10-12 | Stop reason: HOSPADM

## 2022-10-12 RX ORDER — NALOXONE HYDROCHLORIDE 0.4 MG/ML
0.2 INJECTION, SOLUTION INTRAMUSCULAR; INTRAVENOUS; SUBCUTANEOUS
Status: DISCONTINUED | OUTPATIENT
Start: 2022-10-12 | End: 2022-10-12 | Stop reason: HOSPADM

## 2022-10-12 RX ORDER — ONDANSETRON 4 MG/1
4 TABLET, ORALLY DISINTEGRATING ORAL EVERY 6 HOURS PRN
Status: DISCONTINUED | OUTPATIENT
Start: 2022-10-12 | End: 2022-10-12 | Stop reason: HOSPADM

## 2022-10-12 RX ORDER — PROCHLORPERAZINE MALEATE 5 MG
10 TABLET ORAL EVERY 6 HOURS PRN
Status: DISCONTINUED | OUTPATIENT
Start: 2022-10-12 | End: 2022-10-12 | Stop reason: HOSPADM

## 2022-10-12 RX ORDER — PROPOFOL 10 MG/ML
INJECTION, EMULSION INTRAVENOUS CONTINUOUS PRN
Status: DISCONTINUED | OUTPATIENT
Start: 2022-10-12 | End: 2022-10-12

## 2022-10-12 RX ORDER — LIDOCAINE 40 MG/G
CREAM TOPICAL
Status: DISCONTINUED | OUTPATIENT
Start: 2022-10-12 | End: 2022-10-12 | Stop reason: HOSPADM

## 2022-10-12 RX ORDER — ONDANSETRON 2 MG/ML
4 INJECTION INTRAMUSCULAR; INTRAVENOUS EVERY 6 HOURS PRN
Status: DISCONTINUED | OUTPATIENT
Start: 2022-10-12 | End: 2022-10-12 | Stop reason: HOSPADM

## 2022-10-12 RX ADMIN — LIDOCAINE HYDROCHLORIDE 40 MG: 20 INJECTION, SOLUTION INFILTRATION; PERINEURAL at 10:17

## 2022-10-12 RX ADMIN — PROPOFOL 100 MG: 10 INJECTION, EMULSION INTRAVENOUS at 10:17

## 2022-10-12 RX ADMIN — SODIUM CHLORIDE, POTASSIUM CHLORIDE, SODIUM LACTATE AND CALCIUM CHLORIDE: 600; 310; 30; 20 INJECTION, SOLUTION INTRAVENOUS at 09:38

## 2022-10-12 RX ADMIN — PROPOFOL 175 MCG/KG/MIN: 10 INJECTION, EMULSION INTRAVENOUS at 10:17

## 2022-10-12 ASSESSMENT — ACTIVITIES OF DAILY LIVING (ADL)
ADLS_ACUITY_SCORE: 20
ADLS_ACUITY_SCORE: 20
ADLS_ACUITY_SCORE: 35

## 2022-10-12 NOTE — H&P
"PRE-PROCEDURE NOTE    CHIEF COMPLAINT / REASON FORPROCEDURE:  Need for screening colonoscopy.    PERTINENT HISTORY   Patient with no complaints. Previous colonoscopy none. No new diarrhea, constipation, abdominal pain or rectal bleeding. Does have loose stools once a week. No family history of colon polyps or colon cancer.  Past Medical History:   Diagnosis Date     Personal history of other medical treatment (CODE)     Childbirth     Past Surgical History:   Procedure Laterality Date     DILATION AND CURETTAGE      1992,after SAB     HYSTERECTOMY VAGINAL N/A 8/8/2018    Procedure: HYSTERECTOMY VAGINAL;  Total Vaginal Hysterectomy and Bilateral Salpingectomy, Left oophorectomy;  Surgeon: Demarco Verdugo MD;  Location:  OR     LAPAROSCOPIC NISSEN FUNDOPLICATION N/A 4/21/2021    Procedure: FUNDOPLICATION, NISSEN, LAPAROSCOPIC;  Surgeon: Damien Ferguson MD;  Location:  OR     Other:  None  Bleeding tendencies:  No    Relevant Family History:  none    Relevant Social History:  none    A relevant review of systems was performed and was Negative.    ALLERGIES/SENSITIVITIES: No Known Allergies     CURRENTMEDICATIONS:    No current facility-administered medications on file prior to encounter.  amLODIPine (NORVASC) 5 MG tablet, Take 1 tablet (5 mg) by mouth daily  bisacodyl (DULCOLAX) 5 MG EC tablet, Use as directed per colonoscopy prep.  lisinopril (ZESTRIL) 40 MG tablet, Take 1 tablet (40 mg) by mouth daily  multivitamin w/minerals (THERA-VIT-M) tablet, Take 1 tablet by mouth daily      Current Facility-Administered Medications   Medication     lactated ringers infusion     lidocaine (LMX4) cream     lidocaine 1 % 0.1-1 mL     ondansetron (ZOFRAN) injection 4 mg     sodium chloride (PF) 0.9% PF flush 3 mL     sodium chloride (PF) 0.9% PF flush 3 mL       PRE-SEDATION ASSESSMENT:    /78   Pulse 66   Temp 98  F (36.7  C)   Resp 20   Ht 1.651 m (5' 5\")   Wt 83 kg (183 lb)   LMP 06/02/2018   SpO2 99%  "  BMI 30.45 kg/m    Lung Exam:  Normal  Heart Exam:  Normal    Comment(s):      IMPRESSION:  Need for screening colonoscopy.    PLAN:  I discussed screening colonoscopy with the patient.

## 2022-10-12 NOTE — OP NOTE
PROCEDURE NOTE    SURGEON: Denise Tolentino MD.    PRE-OP DIAGNOSIS:  Screening Colonoscopy      POST-OP DIAGNOSIS: colon polyp diverticula sigmoid colon     Location: Sigmoid Size: 0.3 cm  Removed:  Y    PROCEDURE:  Colonoscopy with polypectomy cold snare    ESTIMATEDBLOOD LOSS: none    COMPLICATIONS:  None    SPECIMEN:  Sigmoid colon    ANESTHESIA:  See anesthesia note    INDICATION FOR THE PROCEDURE: The patient is a 47 year old female. The patient has no complaints. I explained to the patient the risks, benefits and alternatives to screening colonoscopy for evaluating the colon for colon polyps and colon cancer. We specifically discussed the risks of bleeding, infection, perforation, potential inability to reach the cecum and the risks of sedation. The patient's questions were answered and the patient wished to proceed. Informed consent paperwork was completed.    PROCEDURE: The patient was taken to the endoscopy suite. Appropriate monitors were attached. The patient was placed in the left lateral decubitus position.Timeout was performed confirming the patient's identity and procedure to be performed. After appropriate sedation was confirmed, digital rectal exam was performed. There was normal tone and no gross abnormality was noted. The lubricated colonoscope was introduced into the anus the colon was insufflated with air. The prep quality was adequate. Under direct visualization the scope was advanced to the cecum. The ileocecal valve was intubated and the terminal ileum inspected. No gross abnormality was noted. The scope was withdrawn back into the cecum. The mucosa of colon was inspected while withdrawing the scope. Diverticula were noted in the sigmoid colon. A small sessile polyp was noted in the rectum and removed with cold snare. The scope was retroflexed in the rectum and the anorectal junction was inspected. No abnormalities were noted. The scope was returned to a neutral position and the colon was  decompressed. The scope was removed. The patient tolerated the procedure with no immediately apparent complication. The patient was taken to recovery in stable condition.  FOLLOW UP:  RECOMMEND high fiber diet, follow up: will call with pathology results.

## 2022-10-12 NOTE — DISCHARGE INSTRUCTIONS
Procedure you had done: rectal polyp  Your health care provider is:  Hali Foster  Your surgeon is Dr. Denise Lou.   Please call your health care provider or surgeon at [phone] if:    - you feel you are getting worse or having an increase in problems    - fever greater than 101 degrees  - increasing shortness of breath or chest pain  - any signs of infection (increasing redness, swelling, tenderness, warmth, change in appearance, or  increased drainage)  - blood in your urine or stool  - coughing or vomiting blood  - nausea (upset stomach) and vomiting and/or diarrhea that will not stop  - severe pain that is not relieved by medicine, rest or ice  You have had medications for sedation. Please be aware that this can cause drowsiness and impaired judgment for up to 24 hours after your procedure. Do not drive, operate power tools or drink alcohol for 24 hours.  If samples were taken-you will get a phone call and a letter with your results in the next 7-10 days. If you don't get results, please call and let us know!     [facility]  Adult Discharge Orders & Instructions    ________________________________________________________________          For 12 hours after surgery  Get plenty of rest.  A responsible adult must stay with you for at least 12 hours after you leave the hospital.   You may feel lightheaded.  IF so, sit for a few minutes before standing.  Have someone help you get up.   You may have a slight fever. Call the doctor if your fever is over 101 F (38.3 C) (taken under the tongue) or lasts longer than 24 hours.  You may have a dry mouth, a sore throat, muscle aches or trouble sleeping.  These should go away after 24 hours.  Do not make important or legal decisions.  6.   Do not drive or use heavy equipment.  If you have weakness or tingling, don't drive or use heavy equipment until this feeling goes away.    To contact a doctor, call   [phone]_______________________

## 2022-10-12 NOTE — ANESTHESIA CARE TRANSFER NOTE
Patient: Yanna Ordonez    Procedure: Procedure(s):  COLONOSCOPY, WITH POLYPECTOMY       Diagnosis: Chronic diarrhea of unknown origin [K52.9]  Special screening for malignant neoplasms, colon [Z12.11]  Diagnosis Additional Information: No value filed.    Anesthesia Type:   MAC     Note:    Oropharynx: oropharynx clear of all foreign objects and spontaneously breathing  Level of Consciousness: drowsy  Oxygen Supplementation: room air    Independent Airway: airway patency satisfactory and stable  Dentition: dentition unchanged  Vital Signs Stable: post-procedure vital signs reviewed and stable  Report to RN Given: handoff report given  Patient transferred to: Phase II    Handoff Report: Identifed the Patient, Identified the Reponsible Provider, Reviewed the pertinent medical history, Discussed the surgical course, Reviewed Intra-OP anesthesia mangement and issues during anesthesia, Set expectations for post-procedure period and Allowed opportunity for questions and acknowledgement of understanding      Vitals:  Vitals Value Taken Time   BP     Temp     Pulse     Resp     SpO2 98 % 10/12/22 1043   Vitals shown include unvalidated device data.    Electronically Signed By: ANGEL Pedroza CRNA  October 12, 2022  10:44 AM

## 2022-10-12 NOTE — ANESTHESIA PREPROCEDURE EVALUATION
Anesthesia Pre-Procedure Evaluation    Patient: Yanna Ordonez   MRN: [id number] : 1975        Procedure : Procedure(s):  COLONOSCOPY          Past Medical History:   Diagnosis Date     Personal history of other medical treatment (CODE)     Childbirth      Past Surgical History:   Procedure Laterality Date     DILATION AND CURETTAGE      ,after SAB     HYSTERECTOMY VAGINAL N/A 2018    Procedure: HYSTERECTOMY VAGINAL;  Total Vaginal Hysterectomy and Bilateral Salpingectomy, Left oophorectomy;  Surgeon: Demarco Verdugo MD;  Location:  OR     LAPAROSCOPIC NISSEN FUNDOPLICATION N/A 2021    Procedure: FUNDOPLICATION, NISSEN, LAPAROSCOPIC;  Surgeon: Damien Ferguson MD;  Location:  OR      No Known Allergies   Social History     Tobacco Use     Smoking status: Former     Packs/day: 0.50     Types: Cigarettes     Quit date: 10/1/2015     Years since quittin.0     Smokeless tobacco: Never     Tobacco comments:     Quit smoking: Had quit for 6 years and started again about a month ago   Substance Use Topics     Alcohol use: No     Alcohol/week: 1.7 standard drinks      Wt Readings from Last 1 Encounters:   10/12/22 83 kg (183 lb)        Anesthesia Evaluation            ROS/MED HX  ENT/Pulmonary:  - neg pulmonary ROS     Neurologic:  - neg neurologic ROS     Cardiovascular:     (+) hypertension-----    METS/Exercise Tolerance: >4 METS    Hematologic:  - neg hematologic  ROS     Musculoskeletal:  - neg musculoskeletal ROS     GI/Hepatic:     (+) GERD, hiatal hernia,     Renal/Genitourinary:  - neg Renal ROS     Endo:     (+) Obesity,     Psychiatric/Substance Use:  - neg psychiatric ROS     Infectious Disease:  - neg infectious disease ROS     Malignancy:  - neg malignancy ROS     Other:  - neg other ROS          Physical Exam    Airway        Mallampati: II   TM distance: > 3 FB   Neck ROM: full   Mouth opening: > 3 cm    Respiratory Devices and Support         Dental  no notable  dental history         Cardiovascular   cardiovascular exam normal       Rhythm and rate: regular and normal     Pulmonary   pulmonary exam normal        breath sounds clear to auscultation           OUTSIDE LABS:  CBC:   Lab Results   Component Value Date    WBC 8.9 04/22/2021    WBC 8.9 08/09/2018    HGB 11.6 (L) 04/22/2021    HGB 10.9 (L) 08/09/2018    HCT 35.9 04/22/2021    HCT 33.0 (L) 08/09/2018     04/22/2021     04/21/2021     BMP:   Lab Results   Component Value Date     06/28/2022     02/04/2021    POTASSIUM 4.2 06/28/2022    POTASSIUM 4.1 02/04/2021    CHLORIDE 103 06/28/2022    CHLORIDE 105 02/04/2021    CO2 29 06/28/2022    CO2 27 02/04/2021    BUN 11 06/28/2022    BUN 14 02/04/2021    CR 0.77 06/28/2022    CR 0.84 04/21/2021    GLC 84 06/28/2022     (H) 02/04/2021     COAGS: No results found for: PTT, INR, FIBR  POC:   Lab Results   Component Value Date    HCG Negative 08/08/2018     HEPATIC:   Lab Results   Component Value Date    ALBUMIN 4.8 02/04/2021    PROTTOTAL 7.6 02/04/2021    ALT 19 02/04/2021    AST 14 02/04/2021    ALKPHOS 51 02/04/2021    BILITOTAL 0.4 02/04/2021     OTHER:   Lab Results   Component Value Date    A1C 5.5 03/15/2021    DEBBIE 10.1 06/28/2022    CRP 4.3 06/28/2022    SED 13 06/28/2022       Anesthesia Plan    ASA Status:  2      Anesthesia Type: MAC.     - Reason for MAC: straight local not clinically adequate              Consents    Anesthesia Plan(s) and associated risks, benefits, and realistic alternatives discussed. Questions answered and patient/representative(s) expressed understanding.     - Discussed: Risks, Benefits and Alternatives for the PROCEDURE were discussed     - Discussed with:  Patient         Postoperative Care            Comments:                David Kellerman, APRN CRNA

## 2022-10-12 NOTE — ANESTHESIA POSTPROCEDURE EVALUATION
Patient: Yanna Ordonez    Procedure: Procedure(s):  COLONOSCOPY, WITH POLYPECTOMY       Anesthesia Type:  MAC    Note:  Disposition: Outpatient   Postop Pain Control: Uneventful            Sign Out: Well controlled pain   PONV: No   Neuro/Psych: Uneventful            Sign Out: Acceptable/Baseline neuro status   Airway/Respiratory: Uneventful            Sign Out: Acceptable/Baseline resp. status   CV/Hemodynamics: Uneventful            Sign Out: Acceptable CV status; No obvious hypovolemia; No obvious fluid overload   Other NRE: NONE   DID A NON-ROUTINE EVENT OCCUR? No           Last vitals:  Vitals Value Taken Time   /84 10/12/22 1056   Temp     Pulse 62 10/12/22 1056   Resp     SpO2 98 % 10/12/22 1056   Vitals shown include unvalidated device data.    Electronically Signed By: ANGEL Pedroza CRNA  October 12, 2022  11:06 AM

## 2022-10-17 LAB
PATH REPORT.COMMENTS IMP SPEC: NORMAL
PATH REPORT.FINAL DX SPEC: NORMAL
PATH REPORT.RELEVANT HX SPEC: NORMAL
PHOTO IMAGE: NORMAL

## 2023-05-02 ENCOUNTER — OFFICE VISIT (OUTPATIENT)
Dept: FAMILY MEDICINE | Facility: OTHER | Age: 48
End: 2023-05-02
Attending: NURSE PRACTITIONER
Payer: COMMERCIAL

## 2023-05-02 VITALS
HEART RATE: 79 BPM | BODY MASS INDEX: 29.17 KG/M2 | RESPIRATION RATE: 16 BRPM | SYSTOLIC BLOOD PRESSURE: 122 MMHG | HEIGHT: 66 IN | TEMPERATURE: 99.7 F | WEIGHT: 181.5 LBS | DIASTOLIC BLOOD PRESSURE: 85 MMHG | OXYGEN SATURATION: 97 %

## 2023-05-02 DIAGNOSIS — U07.1 INFECTION DUE TO 2019 NOVEL CORONAVIRUS: Primary | ICD-10-CM

## 2023-05-02 DIAGNOSIS — J02.9 SORE THROAT: ICD-10-CM

## 2023-05-02 LAB
GROUP A STREP BY PCR: NOT DETECTED
SARS-COV-2 RNA RESP QL NAA+PROBE: POSITIVE

## 2023-05-02 PROCEDURE — 87651 STREP A DNA AMP PROBE: CPT | Mod: ZL

## 2023-05-02 PROCEDURE — U0005 INFEC AGEN DETEC AMPLI PROBE: HCPCS | Mod: ZL

## 2023-05-02 PROCEDURE — 99214 OFFICE O/P EST MOD 30 MIN: CPT | Mod: CS

## 2023-05-02 PROCEDURE — C9803 HOPD COVID-19 SPEC COLLECT: HCPCS

## 2023-05-02 ASSESSMENT — PAIN SCALES - GENERAL: PAINLEVEL: NO PAIN (0)

## 2023-05-02 NOTE — NURSING NOTE
Pt presents to clinic today for possible strep throat. Patient reports symptoms began Sunday morning. Patient reports the following symptoms: sore throat (only on Sunday), fever, ear ache, body aches. Patient reports even with tylenol, she has not been able to get fever under 99, but her highest recorded temp was 101.4. Patient last took Tylenol around 0900 this morning. Patient also reports taking DayQuil but has not taken any yet this morning.       FOOD SECURITY SCREENING QUESTIONS:    The next two questions are to help us understand your food security.  If you are feeling you need any assistance in this area, we have resources available to support you today.    Hunger Vital Signs:  Within the past 12 months we worried whether our food would run out before we got money to buy more. Never  Within the past 12 months the food we bought just didn't last and we didn't have money to get more. Never      Medication Reconciliation: complete  Lisa Grant LPN,LPN on 5/2/2023 at 10:14 AM

## 2023-05-02 NOTE — PROGRESS NOTES
ASSESSMENT/PLAN:    I have reviewed the nursing notes.  I have reviewed the findings, diagnosis, plan and need for follow up with the patient.    1. Infection due to 2019 novel coronavirus  2. Sore throat  - Symptomatic COVID-19 Virus (Coronavirus) by PCR Nose  - Group A Streptococcus PCR Throat Swab  - nirmatrelvir and ritonavir (PAXLOVID) 300 mg/100 mg therapy pack; Take 3 tablets by mouth 2 times daily for 5 days  Dispense: 30 tablet; Refill: 0     Patient presents with upper respiratory symptoms.  Patient's vitals are stable except for low-grade fever and patient appears nontoxic.  Patient tested positive for COVID and she was notified of the results.  Will treat with Paxlovid antiviral medication. Reviewed patient's kidney function and potential drug drug interactions.  Advised patient to follow CDC guidelines for quarantining. Discussed symptomatic treatment - Encouraged fluids, salt water gargles, honey, elevation, humidifier, sinus rinse/netti pot, lozenges, tea, topical vapor rub, popsicles, rest, etc. May use over-the-counter Tylenol or ibuprofen PRN.    Discussed warning signs/symptoms indicative of need to f/u    Follow up if symptoms persist or worsen or concerns    I explained my diagnostic considerations and recommendations to the patient, who voiced understanding and agreement with the treatment plan. All questions were answered. We discussed potential side effects of any prescribed or recommended therapies, as well as expectations for response to treatments.    Monty Wood, ANGEL CNP  5/2/2023  10:23 AM    HPI:    Yanna Ordonez is a 47 year old female  who presents to [facility] today for concerns of sore throat    URI, x 2 days    Symptoms:  YES: + fevers or chills. Fever, highest reported temperature: 101.3 F  YES: + sore throat/pharyngitis/tonsillitis.   YES: + allergy/URI Symptoms  YES: + muffled sounds/change in hearing  YES: + sensation of fullness in ear(s)  No ringing in  ears/tinnitus  No balance changes  YES: + dizziness  YES: + congestion (head/nasal/chest)  YES: + cough/productive cough  YES: + post nasal drip   YES: + headache  YES: + sinus pain/pressure  YES: + myalgias  No otalgia  No rash  Activity Level Changes: Yes: increased fatigue  Appetite/Liquid Intake Changes: Yes: decreased  Changes to Bowel Habits: Yes: diarrhea  Changes to Bladder Habits: No  Additional Symptoms to Report: No  History of similar symptoms: Yes: covid and strep  Prior workup: No    Treatments tried: Tylenol/Ibuprofen, OTC Cough med, Fluids and Rest    Site of exposure: not known.  Type of exposure: not known    Other Pertinent History: none    Allergies: NKA    PCP: Dr. Foster    Past Medical History:   Diagnosis Date     Personal history of other medical treatment (CODE)     Childbirth     Past Surgical History:   Procedure Laterality Date     COLONOSCOPY N/A 10/12/2022    hyperplastic - f/u 10 years     DILATION AND CURETTAGE      ,after SAB     HYSTERECTOMY VAGINAL N/A 2018    Procedure: HYSTERECTOMY VAGINAL;  Total Vaginal Hysterectomy and Bilateral Salpingectomy, Left oophorectomy;  Surgeon: Demarco Verdugo MD;  Location:  OR     LAPAROSCOPIC NISSEN FUNDOPLICATION N/A 2021    Procedure: FUNDOPLICATION, NISSEN, LAPAROSCOPIC;  Surgeon: Damien Ferguson MD;  Location:  OR     Social History     Tobacco Use     Smoking status: Former     Packs/day: 0.50     Types: Cigarettes     Quit date: 2022     Years since quittin.0     Passive exposure: Never     Smokeless tobacco: Never   Vaping Use     Vaping status: Never Used     Passive vaping exposure: Yes   Substance Use Topics     Alcohol use: No     Alcohol/week: 1.7 standard drinks of alcohol     Current Outpatient Medications   Medication Sig Dispense Refill     amLODIPine (NORVASC) 5 MG tablet Take 1 tablet (5 mg) by mouth daily 90 tablet 3     lisinopril (ZESTRIL) 40 MG tablet Take 1 tablet (40 mg) by mouth  "daily 90 tablet 3     bisacodyl (DULCOLAX) 5 MG EC tablet Use as directed per colonoscopy prep. (Patient not taking: Reported on 5/2/2023) 2 tablet 0     multivitamin w/minerals (THERA-VIT-M) tablet Take 1 tablet by mouth daily (Patient not taking: Reported on 5/2/2023)       nicotine (NICODERM CQ) 14 MG/24HR 24 hr patch Place 1 patch onto the skin every 24 hours (Patient not taking: Reported on 5/2/2023) 28 patch 0     nicotine (NICODERM CQ) 7 MG/24HR 24 hr patch Place 1 patch onto the skin every 24 hours (Patient not taking: Reported on 5/2/2023) 28 patch 0     No Known Allergies  Past medical history, past surgical history, current medications and allergies reviewed and accurate to the best of my knowledge.      ROS:  Refer to HPI    /85 (BP Location: Right arm, Patient Position: Sitting, Cuff Size: Adult Regular)   Pulse 79   Temp 99.7  F (37.6  C) (Tympanic)   Resp 16   Ht 1.664 m (5' 5.5\")   Wt 82.3 kg (181 lb 8 oz)   LMP 06/02/2018   SpO2 97%   BMI 29.74 kg/m      EXAM:  General Appearance: Well appearing 47 year old female, appropriate appearance for age. No acute distress   Ears: Left TM intact, translucent with bony landmarks appreciated, no erythema, no effusion, no bulging, no purulence.  Right TM intact, translucent with bony landmarks appreciated, no erythema, no effusion, no bulging, no purulence.  Left auditory canal clear.  Right auditory canal clear.  Normal external ears, non tender.  Eyes: conjunctivae normal without erythema or irritation, corneas clear, no drainage or crusting, no eyelid swelling, pupils equal   Oropharynx: moist mucous membranes, posterior pharynx with erythema, tonsils symmetric and 1+, mild erythema, no exudates or petechiae, no post nasal drip seen, no trismus, voice clear.    Sinuses:  No sinus tenderness upon palpation of the frontal or maxillary sinuses  Nose:  Bilateral nares: no erythema, no edema, clear drainage and congestion   Neck: supple without " adenopathy  Respiratory: normal chest wall and respirations.  Normal effort.  Clear to auscultation bilaterally, no wheezing, crackles or rhonchi.  No increased work of breathing.  No cough appreciated.  Cardiac: RRR with no murmurs  Neuro: Alert and oriented to person, place, and time.    Psychological: normal affect, alert, oriented, and pleasant.     Labs:  Results for orders placed or performed in visit on 05/02/23   Symptomatic COVID-19 Virus (Coronavirus) by PCR Nose     Status: Abnormal    Specimen: Nose; Swab   Result Value Ref Range    SARS CoV2 PCR Positive (A) Negative    Narrative    Testing was performed using the Xpert Xpress SARS-CoV-2 Assay on the Cepheid Gene-Xpert Instrument Systems. Additional information about this Emergency Use Authorization (EUA) assay can be found via the Lab Guide. This test should be ordered for the detection of SARS-CoV-2 in individuals who meet SARS-CoV-2 clinical and/or epidemiological criteria as well as from individuals without symptoms or other reasons to suspect COVID-19. Test performance for asymptomatic patients has only been established in anterior nasal swab specimens. This test is for in vitro diagnostic use under the FDA EUA for laboratories certified under CLIA to perform high complexity testing. This test has not been FDA cleared or approved. A negative result does not rule out the presence of PCR inhibitors in the specimen or target RNA concentration below the limit of detection for the assay. The possibility of a false negative should be considered if the patient's recent exposure or clinical presentation suggests COVID-19. This test was validated by [facility]. This laboratory is certified under the Clinical Laboratory Improvement Amendments (CLIA) as qualified to perform high complexity clinical laboratory testing.   Group A Streptococcus PCR Throat Swab     Status: Normal    Specimen: Throat; Swab    Result Value Ref Range    Group A strep by PCR Not Detected Not Detected    Narrative    The Xpert Xpress Strep A test, performed on the [employer]  Instrument Systems, is a rapid, qualitative in vitro diagnostic test for the detection of Streptococcus pyogenes (Group A ß-hemolytic Streptococcus, Strep A) in throat swab specimens from patients with signs and symptoms of pharyngitis. The Xpert Xpress Strep A test can be used as an aid in the diagnosis of Group A Streptococcal pharyngitis. The assay is not intended to monitor treatment for Group A Streptococcus infections. The Xpert Xpress Strep A test utilizes an automated real-time polymerase chain reaction (PCR) to detect Streptococcus pyogenes DNA.

## 2023-05-06 ENCOUNTER — HEALTH MAINTENANCE LETTER (OUTPATIENT)
Age: 48
End: 2023-05-06

## 2023-06-15 ENCOUNTER — NURSE TRIAGE (OUTPATIENT)
Dept: FAMILY MEDICINE | Facility: OTHER | Age: 48
End: 2023-06-15
Payer: COMMERCIAL

## 2023-06-15 NOTE — TELEPHONE ENCOUNTER
Called and informed patient of Dr. Gary Ramsey's response and patient verbalized understanding.  Patient states she would like to wait until Dr. Gary Ramsey gets back .  Patient transferred to scheduling.  Stefania Castro RN on 6/15/2023 at 4:11 PM

## 2023-06-15 NOTE — TELEPHONE ENCOUNTER
"Called patient to triage due to my chart appointment scheduled for   \"Patient Comments:  Fluttering of the heart since having Covid, hip and lower back pain. Blood pressure and cholesterol check.\"    S-(situation): called and spoke with patient and patient states she has been having heart fluttering.    B-(background): patient has history of high cholesterol but not on any medications.  Has history of high BP and on meds.  States she has had COVID 3 times states last COVID a month ago    A-(assessment): patient states that she has been having heart fluttering since last COVID about a month ago.  States happens about 2-3 times/day and last about 2 minutes.  States one time 3 weeks ago when having an episode her left shoulder hurt but nothing since.  Denies any other symptoms  Not having any now.  Patient does have an appointment scheduled but not until 8/10/23    R-(recommendations): informed patient will route information to Dr. Gary Ramsey for review and consideration.  Stefania Castro RN on 6/15/2023 at 3:17 PM          Reason for Disposition    Patient wants to be seen    Additional Information    Negative: Passed out (i.e., lost consciousness, collapsed and was not responding)    Negative: Shock suspected (e.g., cold/pale/clammy skin, too weak to stand, low BP, rapid pulse)    Negative: Difficult to awaken or acting confused (e.g., disoriented, slurred speech)    Negative: Visible sweat on face or sweat dripping down face    Negative: Unable to walk, or can only walk with assistance (e.g., requires support)    Negative: Received SHOCK from implantable cardiac defibrillator and has persisting symptoms (i.e., palpitations, lightheadedness)    Negative: Dizziness, lightheadedness, or weakness and heart beating very rapidly (e.g., > 140 / minute)    Negative: Dizziness, lightheadedness, or weakness and heart beating very slowly (e.g., < 50 / minute)    Negative: Sounds like a life-threatening emergency to the " "triager    Negative: Chest pain    Negative: Difficulty breathing    Negative: Dizziness, lightheadedness, or weakness    Negative: Heart beating very rapidly (e.g., > 140 / minute) and present now  (Exception: During exercise.)    Negative: Heart beating very slowly (e.g., < 50 / minute)  (Exception: Athlete and heart rate normal for caller.)    Negative: New or worsened shortness of breath with activity (dyspnea on exertion)    Negative: Patient sounds very sick or weak to the triager    Negative: Wearing a 'Holter monitor' or 'cardiac event monitor'    Negative: Received SHOCK from implantable cardiac defibrillator (and now feels well)    Negative: Taking water pill (i.e., diuretic) or heart medication (e.g., digoxin)    Negative: Age > 60 years  (Exception: Brief heartbeat symptoms that went away and now feels well.)    Negative: History of heart disease (i.e., heart attack, bypass surgery, angina, angioplasty)  (Exception: Brief heartbeat symptoms that went away and now feels well.)    Negative: Skipped or extra beat(s) and occurs 4 or more times per minute    Negative: Skipped or extra beat(s) and increases with exercise or exertion    Negative: Heart beating very rapidly (e.g., > 140 / minute) and not present now  (Exception: During exercise.)    Answer Assessment - Initial Assessment Questions  1. DESCRIPTION: \"Please describe your heart rate or heartbeat that you are having\" (e.g., fast/slow, regular/irregular, skipped or extra beats, \"palpitations\")      Flutter, fast  2. ONSET: \"When did it start?\" (Minutes, hours or days)       2 months ago  3. DURATION: \"How long does it last\" (e.g., seconds, minutes, hours)      2 min  4. PATTERN \"Does it come and go, or has it been constant since it started?\"  \"Does it get worse with exertion?\"   \"Are you feeling it now?\"      Come and goes  5. TAP: \"Using your hand, can you tap out what you are feeling on a chair or table in front of you, so that I can hear?\" (Note: " "not all patients can do this)          6. HEART RATE: \"Can you tell me your heart rate?\" \"How many beats in 15 seconds?\"  (Note: not all patients can do this)        Unsure, at work right now  7. RECURRENT SYMPTOM: \"Have you ever had this before?\" If Yes, ask: \"When was the last time?\" and \"What happened that time?\"       denies  8. CAUSE: \"What do you think is causing the palpitations?\"      Unsure, COVID  9. CARDIAC HISTORY: \"Do you have any history of heart disease?\" (e.g., heart attack, angina, bypass surgery, angioplasty, arrhythmia)       High cholesterol and high blood pressure(on meds)  10. OTHER SYMPTOMS: \"Do you have any other symptoms?\" (e.g., dizziness, chest pain, sweating, difficulty breathing)        A little difficulty breathing, night sweats  11. PREGNANCY: \"Is there any chance you are pregnant?\" \"When was your last menstrual period?\"        denies    Protocols used: HEART RATE AND HEARTBEAT [id number]      "

## 2023-06-15 NOTE — TELEPHONE ENCOUNTER
She should be seen in the next week to discuss this.  I will be out of the office from 6/16-6/26/23.  If she would rather wait to be seen by me, ok to offer her a same day appointment after I am back in clinic.  Hali Foster MD

## 2023-07-20 ENCOUNTER — OFFICE VISIT (OUTPATIENT)
Dept: FAMILY MEDICINE | Facility: OTHER | Age: 48
End: 2023-07-20
Attending: FAMILY MEDICINE
Payer: COMMERCIAL

## 2023-07-20 VITALS
BODY MASS INDEX: 29.41 KG/M2 | SYSTOLIC BLOOD PRESSURE: 134 MMHG | TEMPERATURE: 98.6 F | HEIGHT: 66 IN | DIASTOLIC BLOOD PRESSURE: 80 MMHG | OXYGEN SATURATION: 98 % | WEIGHT: 183 LBS | RESPIRATION RATE: 16 BRPM | HEART RATE: 72 BPM

## 2023-07-20 DIAGNOSIS — G43.809 OTHER MIGRAINE WITHOUT STATUS MIGRAINOSUS, NOT INTRACTABLE: ICD-10-CM

## 2023-07-20 DIAGNOSIS — R00.2 HEART PALPITATIONS: Primary | ICD-10-CM

## 2023-07-20 DIAGNOSIS — N95.1 MENOPAUSAL SYNDROME (HOT FLASHES): ICD-10-CM

## 2023-07-20 DIAGNOSIS — I10 ESSENTIAL HYPERTENSION: ICD-10-CM

## 2023-07-20 LAB
ALBUMIN SERPL BCG-MCNC: 4.9 G/DL (ref 3.5–5.2)
ALP SERPL-CCNC: 54 U/L (ref 35–104)
ALT SERPL W P-5'-P-CCNC: 21 U/L (ref 0–50)
ANION GAP SERPL CALCULATED.3IONS-SCNC: 13 MMOL/L (ref 7–15)
AST SERPL W P-5'-P-CCNC: 18 U/L (ref 0–45)
ATRIAL RATE - MUSE: 63 BPM
BASOPHILS # BLD AUTO: 0.1 10E3/UL (ref 0–0.2)
BASOPHILS NFR BLD AUTO: 1 %
BILIRUB SERPL-MCNC: 0.5 MG/DL
BUN SERPL-MCNC: 9.3 MG/DL (ref 6–20)
CALCIUM SERPL-MCNC: 9.5 MG/DL (ref 8.6–10)
CHLORIDE SERPL-SCNC: 102 MMOL/L (ref 98–107)
CREAT SERPL-MCNC: 0.65 MG/DL (ref 0.51–0.95)
DEPRECATED HCO3 PLAS-SCNC: 25 MMOL/L (ref 22–29)
DIASTOLIC BLOOD PRESSURE - MUSE: NORMAL MMHG
EOSINOPHIL # BLD AUTO: 0.1 10E3/UL (ref 0–0.7)
EOSINOPHIL NFR BLD AUTO: 2 %
ERYTHROCYTE [DISTWIDTH] IN BLOOD BY AUTOMATED COUNT: 13.2 % (ref 10–15)
GFR SERPL CREATININE-BSD FRML MDRD: >90 ML/MIN/1.73M2
GLUCOSE SERPL-MCNC: 95 MG/DL (ref 70–99)
HCT VFR BLD AUTO: 41.3 % (ref 35–47)
HGB BLD-MCNC: 13.6 G/DL (ref 11.7–15.7)
IMM GRANULOCYTES # BLD: 0 10E3/UL
IMM GRANULOCYTES NFR BLD: 0 %
INTERPRETATION ECG - MUSE: NORMAL
LYMPHOCYTES # BLD AUTO: 1.9 10E3/UL (ref 0.8–5.3)
LYMPHOCYTES NFR BLD AUTO: 34 %
MCH RBC QN AUTO: 28.4 PG (ref 26.5–33)
MCHC RBC AUTO-ENTMCNC: 32.9 G/DL (ref 31.5–36.5)
MCV RBC AUTO: 86 FL (ref 78–100)
MONOCYTES # BLD AUTO: 0.3 10E3/UL (ref 0–1.3)
MONOCYTES NFR BLD AUTO: 5 %
NEUTROPHILS # BLD AUTO: 3.3 10E3/UL (ref 1.6–8.3)
NEUTROPHILS NFR BLD AUTO: 58 %
NRBC # BLD AUTO: 0 10E3/UL
NRBC BLD AUTO-RTO: 0 /100
P AXIS - MUSE: 45 DEGREES
PLATELET # BLD AUTO: 270 10E3/UL (ref 150–450)
POTASSIUM SERPL-SCNC: 4.1 MMOL/L (ref 3.4–5.3)
PR INTERVAL - MUSE: 130 MS
PROT SERPL-MCNC: 7.8 G/DL (ref 6.4–8.3)
QRS DURATION - MUSE: 86 MS
QT - MUSE: 414 MS
QTC - MUSE: 423 MS
R AXIS - MUSE: 28 DEGREES
RBC # BLD AUTO: 4.79 10E6/UL (ref 3.8–5.2)
SODIUM SERPL-SCNC: 140 MMOL/L (ref 136–145)
SYSTOLIC BLOOD PRESSURE - MUSE: NORMAL MMHG
T AXIS - MUSE: 33 DEGREES
TSH SERPL DL<=0.005 MIU/L-ACNC: 1.92 UIU/ML (ref 0.3–4.2)
VENTRICULAR RATE- MUSE: 63 BPM
WBC # BLD AUTO: 5.7 10E3/UL (ref 4–11)

## 2023-07-20 PROCEDURE — 84443 ASSAY THYROID STIM HORMONE: CPT | Mod: ZL | Performed by: FAMILY MEDICINE

## 2023-07-20 PROCEDURE — 93000 ELECTROCARDIOGRAM COMPLETE: CPT | Performed by: INTERNAL MEDICINE

## 2023-07-20 PROCEDURE — 36415 COLL VENOUS BLD VENIPUNCTURE: CPT | Mod: ZL | Performed by: FAMILY MEDICINE

## 2023-07-20 PROCEDURE — 80053 COMPREHEN METABOLIC PANEL: CPT | Mod: ZL | Performed by: FAMILY MEDICINE

## 2023-07-20 PROCEDURE — 85025 COMPLETE CBC W/AUTO DIFF WBC: CPT | Mod: ZL | Performed by: FAMILY MEDICINE

## 2023-07-20 PROCEDURE — 99213 OFFICE O/P EST LOW 20 MIN: CPT | Performed by: FAMILY MEDICINE

## 2023-07-20 PROCEDURE — 96372 THER/PROPH/DIAG INJ SC/IM: CPT | Performed by: FAMILY MEDICINE

## 2023-07-20 PROCEDURE — 250N000011 HC RX IP 250 OP 636: Mod: JZ | Performed by: FAMILY MEDICINE

## 2023-07-20 RX ORDER — ESTRADIOL 0.5 MG/1
0.5 TABLET ORAL DAILY
Qty: 90 TABLET | Refills: 3 | Status: SHIPPED | OUTPATIENT
Start: 2023-07-20 | End: 2024-08-07

## 2023-07-20 RX ORDER — KETOROLAC TROMETHAMINE 30 MG/ML
30 INJECTION, SOLUTION INTRAMUSCULAR; INTRAVENOUS ONCE
Status: COMPLETED | OUTPATIENT
Start: 2023-07-20 | End: 2023-07-20

## 2023-07-20 RX ORDER — SUMATRIPTAN 50 MG/1
50 TABLET, FILM COATED ORAL
Qty: 10 TABLET | Refills: 11 | Status: SHIPPED | OUTPATIENT
Start: 2023-07-20

## 2023-07-20 RX ADMIN — KETOROLAC TROMETHAMINE 30 MG: 30 INJECTION, SOLUTION INTRAMUSCULAR; INTRAVENOUS at 11:26

## 2023-07-20 ASSESSMENT — ANXIETY QUESTIONNAIRES
5. BEING SO RESTLESS THAT IT IS HARD TO SIT STILL: NOT AT ALL
GAD7 TOTAL SCORE: 9
IF YOU CHECKED OFF ANY PROBLEMS ON THIS QUESTIONNAIRE, HOW DIFFICULT HAVE THESE PROBLEMS MADE IT FOR YOU TO DO YOUR WORK, TAKE CARE OF THINGS AT HOME, OR GET ALONG WITH OTHER PEOPLE: SOMEWHAT DIFFICULT
4. TROUBLE RELAXING: MORE THAN HALF THE DAYS
3. WORRYING TOO MUCH ABOUT DIFFERENT THINGS: SEVERAL DAYS
6. BECOMING EASILY ANNOYED OR IRRITABLE: MORE THAN HALF THE DAYS
GAD7 TOTAL SCORE: 9
7. FEELING AFRAID AS IF SOMETHING AWFUL MIGHT HAPPEN: MORE THAN HALF THE DAYS
2. NOT BEING ABLE TO STOP OR CONTROL WORRYING: SEVERAL DAYS
1. FEELING NERVOUS, ANXIOUS, OR ON EDGE: SEVERAL DAYS

## 2023-07-20 ASSESSMENT — PAIN SCALES - GENERAL: PAINLEVEL: MODERATE PAIN (4)

## 2023-07-20 NOTE — NURSING NOTE
Chief Complaint   Patient presents with     Heart Problem         Medication Reconciliation: complete    Radha Cabrera, LPN

## 2023-07-20 NOTE — PROGRESS NOTES
Nursing Notes:   Radha Cabrera LPN  7/20/2023 10:39 AM  Signed  Chief Complaint   Patient presents with     Heart Problem         Medication Reconciliation: complete    Radha NASSAR. JUANCARLOS Cabrera          Subjective   Yanna is a 48 year old, presenting for the following health issues:  Heart Problem    Yanna is here today for heart palpitations.  Feels a fluttering sensation.  Just lasts a few seconds, then goes away.  Noticing it initally when she lays down to go to bed.  Feels more badillo and anxious and depressed lately.  Has had a headache for a few days.  Also having muscle cramps.  The headache is on the left top of her head.  No vision changes with the headache.  Made an eye appointment and her eyes are ok.  Gets nausea with her headache.  Has had some vision changes.  Had a CT of her brain about 6 months ago at the [facility] and was told it was normal and that she had a migraine.  The migraine won't go away.  Has had it for about 5 days.  Has had heart palpitations since having covid 2 months ago.  toradol helped her migraine when she had it 6 months ago.  Has tried ibuprofen and acetaminophen, which have not helped.  Has had a prior hysterectomy, but has had terrible hot flashes.  She has been restless for at least 2 hours a night.  Sometimes has to change in the middle of the night.         No data to display              History of Present Illness       Mental Health Follow-up:  Patient presents to follow-up on Depression & Anxiety.Patient's depression since last visit has been:  Worse  The patient is having other symptoms associated with depression.  Patient's anxiety since last visit has been:  Worse  The patient is having other symptoms associated with anxiety.  Any significant life events: No  Patient is not feeling anxious or having panic attacks.  Patient has no concerns about alcohol or drug use.    Headaches:   Since the patient's last clinic visit, headaches are: no change  The patient is  "getting headaches:  Not often but i have had this one since Saturday  She is able to do normal daily activities when she has a migraine.  The patient is taking the following rescue/relief medications:  Ibuprofen (Advil, Motrin), Naproxyn (Aleve), Tylenol and Excedrin   Patient states \"I get some relief\" from the rescue/relief medications.   The patient is taking the following medications to prevent migraines:  No medications to prevent migraines  In the past 4 weeks, the patient has gone to an Urgent Care or Emergency Room 0 times times due to headaches.    She eats 2-3 servings of fruits and vegetables daily.She consumes 1 sweetened beverage(s) daily.She exercises with enough effort to increase her heart rate 10 to 19 minutes per day.  She exercises with enough effort to increase her heart rate 3 or less days per week. She is missing 2 dose(s) of medications per week.  She is not taking prescribed medications regularly due to remembering to take.  Today's KRUNAL-7 Score: 9       Heart/Mood         Review of Systems   Constitutional, HEENT, cardiovascular, pulmonary, GI, , musculoskeletal, neuro, skin, endocrine and psych systems are negative, except as otherwise noted.      Objective    /80   Pulse 72   Temp 98.6  F (37  C) (Tympanic)   Resp 16   Ht 1.664 m (5' 5.5\")   Wt 83 kg (183 lb)   LMP 06/02/2018   SpO2 98%   Breastfeeding No   BMI 29.99 kg/m    Body mass index is 29.99 kg/m .  Physical Exam  Constitutional:       Appearance: Normal appearance.   HENT:      Head: Normocephalic.   Eyes:      Extraocular Movements: Extraocular movements intact.      Pupils: Pupils are equal, round, and reactive to light.   Cardiovascular:      Rate and Rhythm: Normal rate and regular rhythm.      Heart sounds: Normal heart sounds. No murmur heard.  Pulmonary:      Effort: Pulmonary effort is normal.      Breath sounds: Normal breath sounds. No wheezing, rhonchi or rales.   Musculoskeletal:      Cervical back: " Normal range of motion and neck supple.      Right lower leg: No edema.      Left lower leg: No edema.   Neurological:      Mental Status: She is alert.   Psychiatric:         Mood and Affect: Mood normal.         Behavior: Behavior normal.            Results for orders placed or performed in visit on 07/20/23   TSH Reflex GH     Status: Normal   Result Value Ref Range    TSH 1.92 0.30 - 4.20 uIU/mL   Comprehensive metabolic panel     Status: Normal   Result Value Ref Range    Sodium 140 136 - 145 mmol/L    Potassium 4.1 3.4 - 5.3 mmol/L    Chloride 102 98 - 107 mmol/L    Carbon Dioxide (CO2) 25 22 - 29 mmol/L    Anion Gap 13 7 - 15 mmol/L    Urea Nitrogen 9.3 6.0 - 20.0 mg/dL    Creatinine 0.65 0.51 - 0.95 mg/dL    Calcium 9.5 8.6 - 10.0 mg/dL    Glucose 95 70 - 99 mg/dL    Alkaline Phosphatase 54 35 - 104 U/L    AST 18 0 - 45 U/L    ALT 21 0 - 50 U/L    Protein Total 7.8 6.4 - 8.3 g/dL    Albumin 4.9 3.5 - 5.2 g/dL    Bilirubin Total 0.5 <=1.2 mg/dL    GFR Estimate >90 >60 mL/min/1.73m2   CBC with platelets and differential     Status: None   Result Value Ref Range    WBC Count 5.7 4.0 - 11.0 10e3/uL    RBC Count 4.79 3.80 - 5.20 10e6/uL    Hemoglobin 13.6 11.7 - 15.7 g/dL    Hematocrit 41.3 35.0 - 47.0 %    MCV 86 78 - 100 fL    MCH 28.4 26.5 - 33.0 pg    MCHC 32.9 31.5 - 36.5 g/dL    RDW 13.2 10.0 - 15.0 %    Platelet Count 270 150 - 450 10e3/uL    % Neutrophils 58 %    % Lymphocytes 34 %    % Monocytes 5 %    % Eosinophils 2 %    % Basophils 1 %    % Immature Granulocytes 0 %    NRBCs per 100 WBC 0 <1 /100    Absolute Neutrophils 3.3 1.6 - 8.3 10e3/uL    Absolute Lymphocytes 1.9 0.8 - 5.3 10e3/uL    Absolute Monocytes 0.3 0.0 - 1.3 10e3/uL    Absolute Eosinophils 0.1 0.0 - 0.7 10e3/uL    Absolute Basophils 0.1 0.0 - 0.2 10e3/uL    Absolute Immature Granulocytes 0.0 <=0.4 10e3/uL    Absolute NRBCs 0.0 10e3/uL   EKG 12-lead, tracing only (Same Day)     Status: None   Result Value Ref Range    Systolic Blood  Pressure  mmHg    Diastolic Blood Pressure  mmHg    Ventricular Rate 63 BPM    Atrial Rate 63 BPM    MI Interval 130 ms    QRS Duration 86 ms     ms    QTc 423 ms    P Axis 45 degrees    R AXIS 28 degrees    T Axis 33 degrees    Interpretation ECG       Sinus rhythm  Normal ECG  When compared with ECG of 15-MAR-2021 09:16,  No significant change was found  Confirmed by MD THOMAS, YOLIS ([id number]) on 7/20/2023 11:52:19 AM     CBC and Differential     Status: None    Narrative    The following orders were created for panel order CBC and Differential.  Procedure                               Abnormality         Status                     ---------                               -----------         ------                     CBC with platelets and d...[[id number]]                      Final result                 Please view results for these tests on the individual orders.          Assessment & Plan     ICD-10-CM    1. Heart palpitations  R00.2 EKG 12-lead, tracing only (Same Day)     CBC and Differential     TSH Reflex GH     Comprehensive metabolic panel     Adult Leadless EKG Monitor 3 to 7 Days     CBC and Differential     TSH Reflex      Comprehensive metabolic panel      2. Other migraine without status migrainosus, not intractable  G43.809 SUMAtriptan (IMITREX) 50 MG tablet     ketorolac (TORADOL) injection 30 mg      3. Menopausal syndrome (hot flashes)  N95.1 estradiol (ESTRACE) 0.5 MG tablet        1.  Labs and EKG as above were normal.  Recent covid illness may be contributing.  Will evaluate further with ziopatch.  2.  Dose of toradol given for acute symptoms.  Trial of sumatriptan.  If not improving, could consider preventive medication such as inderal vs referral to Neurology.  Imaging at [facility] 2 years ago was normal.  3.  Discussed risks/benefits of HRT to include breast cancer, uterine cancer (she has had prior hysterectomy), MI/CVA, other thromboembolic events.  She understands and is interested  in trying something.  Prescription for Estrace 0.5 mg daily sent to pharmacy.             Encouraged regular exercise.     No follow-ups on file.    Hali Foster MD  [facility] AND [facility]

## 2023-07-21 RX ORDER — LISINOPRIL 40 MG/1
40 TABLET ORAL DAILY
Qty: 90 TABLET | Refills: 3 | Status: SHIPPED | OUTPATIENT
Start: 2023-07-21 | End: 2024-08-07

## 2023-07-21 RX ORDER — AMLODIPINE BESYLATE 5 MG/1
5 TABLET ORAL DAILY
Qty: 90 TABLET | Refills: 3 | Status: SHIPPED | OUTPATIENT
Start: 2023-07-21 | End: 2024-08-07

## 2023-07-21 NOTE — TELEPHONE ENCOUNTER
Routing refill request to provider for review/approval because:    LOV: 7/20/23    Stefania Castro RN on 7/21/2023 at 9:43 AM

## 2023-07-30 ENCOUNTER — HEALTH MAINTENANCE LETTER (OUTPATIENT)
Age: 48
End: 2023-07-30

## 2023-08-02 ENCOUNTER — HOSPITAL ENCOUNTER (OUTPATIENT)
Dept: CARDIOLOGY | Facility: OTHER | Age: 48
Discharge: HOME | End: 2023-08-02
Attending: FAMILY MEDICINE | Admitting: FAMILY MEDICINE
Payer: COMMERCIAL

## 2023-08-02 DIAGNOSIS — R00.2 HEART PALPITATIONS: ICD-10-CM

## 2023-08-02 PROCEDURE — 999N000157 HC STATISTIC RCP TIME EA 10 MIN

## 2023-08-02 PROCEDURE — 93242 EXT ECG>48HR<7D RECORDING: CPT

## 2023-08-02 PROCEDURE — 93244 EXT ECG>48HR<7D REV&INTERPJ: CPT | Performed by: INTERNAL MEDICINE

## 2023-08-02 NOTE — PATIENT INSTRUCTIONS
Date Placed on Pt:  08/02/2023    Respiratory Therapist reviewed Zio Patch placement process and asked patient if they are comfortable proceeding with placement.  Patient (is or is not) is comfortable proceeding.  Patient (would or would not) would not like an employee of same gender to be (present or to place) present or to place the Zio Patch.    Patient instructed not to:   -take baths, swim, sauna   -remove device prior to 7 days   -use electric blankets   -shower or sweat excessively within first 24 hours of device application  Patient instructed to:   -shower as needed   -be carefull when toweling off and dressing   -press button when cardiac symptoms occur   -document symptoms in log book   -remove and return device (send via mail to Zio Patch company)   -Call [employer] with any questions    Documentation of Zio Patch placement site (Bleeding or Not Bleeding) Not Bleeding.  If there is bleeding documentation of why.  N/A    Documentation of accommodations made for patient.  No

## 2023-08-10 ENCOUNTER — HOSPITAL ENCOUNTER (OUTPATIENT)
Dept: MAMMOGRAPHY | Facility: OTHER | Age: 48
Discharge: HOME | End: 2023-08-10
Attending: FAMILY MEDICINE
Payer: COMMERCIAL

## 2023-08-10 ENCOUNTER — OFFICE VISIT (OUTPATIENT)
Dept: FAMILY MEDICINE | Facility: OTHER | Age: 48
End: 2023-08-10
Attending: FAMILY MEDICINE
Payer: COMMERCIAL

## 2023-08-10 VITALS
HEIGHT: 65 IN | HEART RATE: 74 BPM | WEIGHT: 186.6 LBS | DIASTOLIC BLOOD PRESSURE: 84 MMHG | TEMPERATURE: 97.6 F | RESPIRATION RATE: 16 BRPM | BODY MASS INDEX: 31.09 KG/M2 | SYSTOLIC BLOOD PRESSURE: 132 MMHG

## 2023-08-10 DIAGNOSIS — Z13.220 SCREENING FOR LIPID DISORDERS: ICD-10-CM

## 2023-08-10 DIAGNOSIS — Z00.00 HEALTH CARE MAINTENANCE: Primary | ICD-10-CM

## 2023-08-10 DIAGNOSIS — Z12.31 VISIT FOR SCREENING MAMMOGRAM: ICD-10-CM

## 2023-08-10 DIAGNOSIS — Z23 NEED FOR SHINGLES VACCINE: ICD-10-CM

## 2023-08-10 LAB
CHOLEST SERPL-MCNC: 224 MG/DL
HDLC SERPL-MCNC: 83 MG/DL
HOLD SPECIMEN: NORMAL
LDLC SERPL CALC-MCNC: 121 MG/DL
NONHDLC SERPL-MCNC: 141 MG/DL
TRIGL SERPL-MCNC: 102 MG/DL

## 2023-08-10 PROCEDURE — 36415 COLL VENOUS BLD VENIPUNCTURE: CPT | Mod: ZL | Performed by: FAMILY MEDICINE

## 2023-08-10 PROCEDURE — 80061 LIPID PANEL: CPT | Mod: ZL | Performed by: FAMILY MEDICINE

## 2023-08-10 PROCEDURE — 99396 PREV VISIT EST AGE 40-64: CPT | Performed by: FAMILY MEDICINE

## 2023-08-10 PROCEDURE — 77067 SCR MAMMO BI INCL CAD: CPT

## 2023-08-10 ASSESSMENT — ENCOUNTER SYMPTOMS
FREQUENCY: 0
COUGH: 1
BREAST MASS: 0
JOINT SWELLING: 1
FEVER: 0
MYALGIAS: 0
HEADACHES: 1
WEAKNESS: 0
CHILLS: 0
CONSTIPATION: 0
PALPITATIONS: 0
DIZZINESS: 0
HEARTBURN: 1
HEMATURIA: 0
DYSURIA: 0
ABDOMINAL PAIN: 0
NAUSEA: 0
PARESTHESIAS: 1
DIARRHEA: 0
ARTHRALGIAS: 1
EYE PAIN: 0
HEMATOCHEZIA: 0
NERVOUS/ANXIOUS: 0
SHORTNESS OF BREATH: 0
SORE THROAT: 0

## 2023-08-10 ASSESSMENT — PAIN SCALES - GENERAL: PAINLEVEL: NO PAIN (0)

## 2023-08-10 NOTE — NURSING NOTE
Chief Complaint   Patient presents with    Physical         Medication Reconciliation: complete    Radha Cabrera, LPN

## 2023-08-10 NOTE — PROGRESS NOTES
SUBJECTIVE:   CC: Yanna is an 48 year old who presents for preventive health visit.       8/10/2023     9:17 AM   Additional Questions   Roomed by Radha Cabrera     Yanna is here today for a physical.  She had just removed her ziopatch yesterday and plans to mail it in today.  She has had an improvement in her mood and hot flash symptoms since starting on estrace a few weeks ago.  She has still had a few palpitations, but not as bad as they had been.      Healthy Habits:     Getting at least 3 servings of Calcium per day:  NO    Bi-annual eye exam:  Yes    Dental care twice a year:  Yes    Sleep apnea or symptoms of sleep apnea:  Daytime drowsiness    Diet:  Regular (no restrictions)    Frequency of exercise:  1 day/week    Duration of exercise:  15-30 minutes    Taking medications regularly:  Yes    Medication side effects:  None    Additional concerns today:  No      Today's PHQ-2 Score:       8/10/2023     8:10 AM   PHQ-2 (  [employer])   Q1: Little interest or pleasure in doing things 1   Q2: Feeling down, depressed or hopeless 1   PHQ-2 Score 2   Q1: Little interest or pleasure in doing things Several days   Q2: Feeling down, depressed or hopeless Several days   PHQ-2 Score 2       Have you ever done Advance Care Planning? (For example, a Health Directive, POLST, or a discussion with a medical provider or your loved ones about your wishes): No, advance care planning information given to patient to review.  Patient declined advance care planning discussion at this time.    Social History     Tobacco Use    Smoking status: Former     Packs/day: 0.50     Types: Cigarettes     Quit date: 2022     Years since quittin.2     Passive exposure: Never    Smokeless tobacco: Never   Substance Use Topics    Alcohol use: No     Alcohol/week: 1.7 standard drinks of alcohol             8/10/2023     8:09 AM   Alcohol Use   Prescreen: >3 drinks/day or >7 drinks/week? No     Reviewed orders with patient.   Reviewed health maintenance and updated orders accordingly - Yes  BP Readings from Last 3 Encounters:   08/10/23 132/84   23 134/80   23 122/85    Wt Readings from Last 3 Encounters:   08/10/23 84.6 kg (186 lb 9.6 oz)   23 83 kg (183 lb)   23 82.3 kg (181 lb 8 oz)                  Patient Active Problem List   Diagnosis    Carpal tunnel syndrome, bilateral    Hiatal hernia    Neck pain, chronic    Excessive or frequent menstruation    Lightheadedness    S/P hysterectomy    Recurrent periodic urticaria    Arthritis of finger of both hands    Gastroesophageal reflux disease with esophagitis without hemorrhage    Dyshidrotic eczema    History of 2019 novel coronavirus disease (COVID-19)    Morbid obesity (H)    Snoring    Essential hypertension    IFG (impaired fasting glucose)    Hip pain, left     Past Surgical History:   Procedure Laterality Date    COLONOSCOPY N/A 10/12/2022    hyperplastic - f/u 10 years    DILATION AND CURETTAGE      ,after SAB    HYSTERECTOMY VAGINAL N/A 2018    Procedure: HYSTERECTOMY VAGINAL;  Total Vaginal Hysterectomy and Bilateral Salpingectomy, Left oophorectomy;  Surgeon: Demarco Verdugo MD;  Location:  OR    LAPAROSCOPIC NISSEN FUNDOPLICATION N/A 2021    Procedure: FUNDOPLICATION, NISSEN, LAPAROSCOPIC;  Surgeon: Damien Ferguson MD;  Location:  OR       Social History     Tobacco Use    Smoking status: Former     Packs/day: 0.50     Types: Cigarettes     Quit date: 2022     Years since quittin.2     Passive exposure: Never    Smokeless tobacco: Never   Substance Use Topics    Alcohol use: No     Alcohol/week: 1.7 standard drinks of alcohol     Family History   Problem Relation Age of Onset    Other - See Comments Mother 65        Celiac disease    Other - See Comments Father         Glaucoma    Family History Negative Sister         Good Health    Family History Negative Sister         Good Health    Family History Negative  Brother         Good Health    Family History Negative Brother         Good Health,Twin    Family History Negative Brother         Good Health,Twin - Had GIB    Heart Disease No family hx of         Heart Disease,No known premature CAD    Diabetes No family hx of         Diabetes,DM2    Breast Cancer No family hx of         Cancer-breast    Colon Cancer No family hx of         Cancer-colon         Current Outpatient Medications   Medication Sig Dispense Refill    amLODIPine (NORVASC) 5 MG tablet Take 1 tablet (5 mg) by mouth daily 90 tablet 3    estradiol (ESTRACE) 0.5 MG tablet Take 1 tablet (0.5 mg) by mouth daily 90 tablet 3    lisinopril (ZESTRIL) 40 MG tablet Take 1 tablet (40 mg) by mouth daily 90 tablet 3    multivitamin w/minerals (THERA-VIT-M) tablet Take 1 tablet by mouth daily      other medical supplies Shingrix.  Diagnosis:  Z23. 1 each 1    SUMAtriptan (IMITREX) 50 MG tablet Take 1 tablet (50 mg) by mouth at onset of headache for migraine May repeat in 2 hours. Max 4 tablets/24 hours. 10 tablet 11     No Known Allergies    Breast Cancer Screening:  Any new diagnosis of family breast, ovarian, or bowel cancer? No    FHS-7:       2/17/2022    12:50 PM   Breast CA Risk Assessment (FHS-7)   Did any of your first-degree relatives have breast or ovarian cancer? No   Did any of your relatives have bilateral breast cancer? No   Did any man in your family have breast cancer? No   Did any woman in your family have breast and ovarian cancer? No   Did any woman in your family have breast cancer before age 50 y? No   Do you have 2 or more relatives with breast and/or ovarian cancer? No   Do you have 2 or more relatives with breast and/or bowel cancer? No       Mammogram Screening: Recommended annual mammography  Pertinent mammograms are reviewed under the imaging tab.    History of abnormal Pap smear: Status post benign hysterectomy. Health Maintenance and Surgical History updated.      Latest Ref Rng & Units  3/21/2018    10:31 AM   PAP / HPV   HPV 16 DNA NEG^Negative Negative    HPV 18 DNA NEG^Negative Negative    Other HR HPV NEG^Negative Negative      Reviewed and updated as needed this visit by clinical staff   Tobacco  Allergies  Meds              Reviewed and updated as needed this visit by Provider                 Past Medical History:   Diagnosis Date    Personal history of other medical treatment (CODE)     Childbirth      Past Surgical History:   Procedure Laterality Date    COLONOSCOPY N/A 10/12/2022    hyperplastic - f/u 10 years    DILATION AND CURETTAGE      1992,after SAB    HYSTERECTOMY VAGINAL N/A 08/08/2018    Procedure: HYSTERECTOMY VAGINAL;  Total Vaginal Hysterectomy and Bilateral Salpingectomy, Left oophorectomy;  Surgeon: Demarco Verdugo MD;  Location:  OR    LAPAROSCOPIC NISSEN FUNDOPLICATION N/A 04/21/2021    Procedure: FUNDOPLICATION, NISSEN, LAPAROSCOPIC;  Surgeon: Damien Ferguson MD;  Location:  OR       Review of Systems   Constitutional:  Negative for chills and fever.   HENT:  Negative for congestion, ear pain, hearing loss and sore throat.    Eyes:  Negative for pain and visual disturbance.   Respiratory:  Positive for cough. Negative for shortness of breath.    Cardiovascular:  Positive for peripheral edema. Negative for chest pain and palpitations.   Gastrointestinal:  Positive for heartburn. Negative for abdominal pain, constipation, diarrhea, hematochezia and nausea.   Breasts:  Positive for tenderness. Negative for breast mass and discharge.   Genitourinary:  Negative for dysuria, frequency, genital sores, hematuria, pelvic pain, urgency, vaginal bleeding and vaginal discharge.   Musculoskeletal:  Positive for arthralgias and joint swelling. Negative for myalgias.   Skin:  Negative for rash.   Neurological:  Positive for headaches and paresthesias. Negative for dizziness and weakness.   Psychiatric/Behavioral:  Positive for mood changes. The patient is not  "nervous/anxious.           OBJECTIVE:   /84   Pulse 74   Temp 97.6  F (36.4  C) (Tympanic)   Resp 16   Ht 1.651 m (5' 5\")   Wt 84.6 kg (186 lb 9.6 oz)   LMP 06/02/2018   Breastfeeding No   BMI 31.05 kg/m    Physical Exam  Constitutional:       Appearance: Normal appearance. She is well-developed.   HENT:      Head: Normocephalic.      Right Ear: Tympanic membrane and external ear normal.      Left Ear: Tympanic membrane and external ear normal.      Nose: Nose normal.      Mouth/Throat:      Mouth: Mucous membranes are moist.      Pharynx: No oropharyngeal exudate or posterior oropharyngeal erythema.   Eyes:      Extraocular Movements: Extraocular movements intact.      Pupils: Pupils are equal, round, and reactive to light.   Neck:      Thyroid: No thyromegaly.   Cardiovascular:      Rate and Rhythm: Normal rate and regular rhythm.      Heart sounds: Normal heart sounds. No murmur heard.     Comments: Declined breast exam.  Pulmonary:      Effort: Pulmonary effort is normal. No respiratory distress.      Breath sounds: Normal breath sounds. No wheezing or rales.   Chest:      Chest wall: No tenderness.   Abdominal:      General: Bowel sounds are normal. There is no distension.      Palpations: Abdomen is soft. There is no mass.      Tenderness: There is no abdominal tenderness. There is no guarding or rebound.   Genitourinary:     Comments: Deferred due to prior hysterectomy.  Musculoskeletal:         General: No tenderness or deformity.      Cervical back: Normal range of motion and neck supple.   Lymphadenopathy:      Cervical: No cervical adenopathy.   Skin:     General: Skin is warm and dry.   Neurological:      Mental Status: She is alert and oriented to person, place, and time.      Cranial Nerves: No cranial nerve deficit.      Coordination: Coordination normal.   Psychiatric:         Mood and Affect: Mood normal.         Behavior: Behavior normal.         ASSESSMENT/PLAN:       ICD-10-CM    1. " "Health care maintenance  Z00.00       2. Need for shingles vaccine  Z23 other medical supplies      3. Screening for lipid disorders  Z13.220 Lipid Profile     Lipid Profile         Mammogram is scheduled for today.  Pap Smear/HPV deferred due to prior benign hysterectomy.  Colonoscpy last completed 10/12/22 and 10 year follow up recommended.  Discussed covid booster this fall if interested.  Tdap up to date.  Prescription given to obtain shingrix at outside pharmacy.  HIV and Hepatitis C screening declined due to low risk.  See #1.  Lipid profile (fasting) completed today as above.    Patient has been advised of split billing requirements and indicates understanding: Yes      COUNSELING:  Reviewed preventive health counseling, as reflected in patient instructions       Regular exercise       Healthy diet/nutrition       Vision screening       Colorectal Cancer Screening       Consider Hep C screening for all patients one time for ages 18-79 years       HIV screeninx in teen years, 1x in adult years, and at intervals if high risk      BMI:   Estimated body mass index is 31.05 kg/m  as calculated from the following:    Height as of this encounter: 1.651 m (5' 5\").    Weight as of this encounter: 84.6 kg (186 lb 9.6 oz).   Weight management plan: Discussed healthy diet and exercise guidelines      She reports that she quit smoking about 15 months ago. Her smoking use included cigarettes. She smoked an average of .5 packs per day. She has never been exposed to tobacco smoke. She has never used smokeless tobacco.          Hali Foster MD  [facility] AND [facility]  "

## 2023-08-21 ENCOUNTER — OFFICE VISIT (OUTPATIENT)
Dept: FAMILY MEDICINE | Facility: OTHER | Age: 48
End: 2023-08-21
Payer: COMMERCIAL

## 2023-08-21 ENCOUNTER — HOSPITAL ENCOUNTER (OUTPATIENT)
Dept: GENERAL RADIOLOGY | Facility: OTHER | Age: 48
Discharge: HOME | End: 2023-08-21
Payer: COMMERCIAL

## 2023-08-21 VITALS
DIASTOLIC BLOOD PRESSURE: 78 MMHG | RESPIRATION RATE: 16 BRPM | TEMPERATURE: 98.1 F | HEIGHT: 65 IN | BODY MASS INDEX: 30.57 KG/M2 | HEART RATE: 72 BPM | OXYGEN SATURATION: 99 % | WEIGHT: 183.5 LBS | SYSTOLIC BLOOD PRESSURE: 130 MMHG

## 2023-08-21 DIAGNOSIS — M79.645 PAIN OF LEFT THUMB: ICD-10-CM

## 2023-08-21 DIAGNOSIS — S69.92XA INJURY OF LEFT THUMB, INITIAL ENCOUNTER: ICD-10-CM

## 2023-08-21 DIAGNOSIS — S62.619A CLOSED AVULSION FRACTURE OF PROXIMAL PHALANX OF FINGER, INITIAL ENCOUNTER: Primary | ICD-10-CM

## 2023-08-21 PROCEDURE — 73140 X-RAY EXAM OF FINGER(S): CPT | Mod: LT

## 2023-08-21 PROCEDURE — 99213 OFFICE O/P EST LOW 20 MIN: CPT

## 2023-08-21 ASSESSMENT — PAIN SCALES - GENERAL: PAINLEVEL: MILD PAIN (3)

## 2023-08-21 NOTE — NURSING NOTE
"No chief complaint on file.        Initial /78 (BP Location: Right arm, Patient Position: Sitting, Cuff Size: Adult Regular)   Pulse 72   Temp 98.1  F (36.7  C) (Temporal)   Resp 16   Ht 1.651 m (5' 5\")   Wt 83.2 kg (183 lb 8 oz)   LMP 06/02/2018   SpO2 99%   BMI 30.54 kg/m   Estimated body mass index is 30.54 kg/m  as calculated from the following:    Height as of this encounter: 1.651 m (5' 5\").    Weight as of this encounter: 83.2 kg (183 lb 8 oz).     Advance Care Directive on file? no  Advance Care Directive provided to patient? no    FOOD SECURITY SCREENING QUESTIONS:    The next two questions are to help us understand your food security.  If you are feeling you need any assistance in this area, we have resources available to support you today.    Hunger Vital Signs:  Within the past 12 months we worried whether our food would run out before we got money to buy more. Never  Within the past 12 months the food we bought just didn't last and we didn't have money to get more. Never  Agnieszka Park LPN on 8/21/2023 at 9:20 AM      Agnieszka Park     "

## 2023-08-21 NOTE — PROGRESS NOTES
ASSESSMENT/PLAN:    I have reviewed the nursing notes.  I have reviewed the findings, diagnosis, plan and need for follow up with the patient.    1. Closed avulsion fracture of proximal phalanx of finger, initial encounter  2. Injury of left thumb, initial encounter  3. Pain of left thumb  - XR Finger Left G/E 2 Views  - Wrist/Arm Supplies Order Wrist Brace; Left; with thumb spica  - Orthopedic  Referral; Future    Patient presents with an injury to her left thumb after tripping over her dog and falling yesterday.  X-ray of her left thumb indicated bony fragment along the inner margin of the first MCP joint with a possible joint capsular avulsion injury.  Discussed results with patient in clinic.  Patient was given a wrist brace with a thumb spica to help support and stabilize her thumb.  A referral was placed orthopedics for further follow-up.  Advised patient to use ice, rest, elevation, and Tylenol and ibuprofen as needed for pain.    Discussed warning signs/symptoms indicative of need to f/u    Follow up if symptoms persist or worsen or concerns    I explained my diagnostic considerations and recommendations to the patient, who voiced understanding and agreement with the treatment plan. All questions were answered. We discussed potential side effects of any prescribed or recommended therapies, as well as expectations for response to treatments.    Monty Wood, ANGEL CNP  8/21/2023  9:33 AM    HPI:    Yanna Ordonez is a 48 year old female  who presents to [facility] today for concerns of left thumb injury    Patient states that she was going to the restroom yesterday morning and tripped over her dog and fell and caught herself with her left hand. She mostly landed on her left thumb and states she heard a pop and felt instant pain. She states there is some mild swelling but denies redness or bruising. She has not tried ice, tylenol, or ibuprofen. She states there is pain with ROM and  tenderness with palpation. She denies prior injuries to her left thumb. She states there is mild numbness but no tingling.    Past Medical History:   Diagnosis Date    Personal history of other medical treatment (CODE)     Childbirth     Past Surgical History:   Procedure Laterality Date    COLONOSCOPY N/A 10/12/2022    hyperplastic - f/u 10 years    DILATION AND CURETTAGE      ,after SAB    HYSTERECTOMY VAGINAL N/A 2018    Procedure: HYSTERECTOMY VAGINAL;  Total Vaginal Hysterectomy and Bilateral Salpingectomy, Left oophorectomy;  Surgeon: Demarco Verdugo MD;  Location:  OR    LAPAROSCOPIC NISSEN FUNDOPLICATION N/A 2021    Procedure: FUNDOPLICATION, NISSEN, LAPAROSCOPIC;  Surgeon: Damien Ferguson MD;  Location:  OR     Social History     Tobacco Use    Smoking status: Former     Packs/day: 0.50     Types: Cigarettes     Quit date: 2022     Years since quittin.3     Passive exposure: Never    Smokeless tobacco: Never   Substance Use Topics    Alcohol use: No     Alcohol/week: 1.7 standard drinks of alcohol     Current Outpatient Medications   Medication Sig Dispense Refill    amLODIPine (NORVASC) 5 MG tablet Take 1 tablet (5 mg) by mouth daily 90 tablet 3    estradiol (ESTRACE) 0.5 MG tablet Take 1 tablet (0.5 mg) by mouth daily 90 tablet 3    lisinopril (ZESTRIL) 40 MG tablet Take 1 tablet (40 mg) by mouth daily 90 tablet 3    multivitamin w/minerals (THERA-VIT-M) tablet Take 1 tablet by mouth daily      other medical supplies Shingrix.  Diagnosis:  Z23. 1 each 1    SUMAtriptan (IMITREX) 50 MG tablet Take 1 tablet (50 mg) by mouth at onset of headache for migraine May repeat in 2 hours. Max 4 tablets/24 hours. 10 tablet 11     No Known Allergies  Past medical history, past surgical history, current medications and allergies reviewed and accurate to the best of my knowledge.      ROS:  Refer to HPI    /78 (BP Location: Right arm, Patient Position: Sitting, Cuff Size: Adult  "Regular)   Pulse 72   Temp 98.1  F (36.7  C) (Temporal)   Resp 16   Ht 1.651 m (5' 5\")   Wt 83.2 kg (183 lb 8 oz)   LMP 06/02/2018   SpO2 99%   BMI 30.54 kg/m      EXAM:  General Appearance: Well appearing 48 year old female, appropriate appearance for age. No acute distress   Respiratory: normal chest wall and respirations.  Normal effort.  Clear to auscultation bilaterally, no wheezing, crackles or rhonchi.  No increased work of breathing.  No cough appreciated.  Cardiac: RRR with no murmurs  Musculoskeletal:    Left HAND PHYSICAL EXAMINATION:  Inspection: mild edema of left thumb, no bony deformity or skin abnormalities noted.    Palpation: Tenderness to palpation of left first proximal phalange. Tenderness to palpation over anatomical snuffbox/scaphoid: No.     ROM/Strength:   - Thumb adduction/abduction/flexion/extension: Reduced  - Finger flexion/extension (MCP, DIP, PIP): ROM is full, fluid and intact  - Abduction/Adduction (2-5th MCP joint): ROM is full, fluid and intact  - Opposition: intact   -  strength: reduced    Neurovascular status: sensation and distal pulses intact.   Dermatological: no rashes noted of exposed skin  Neuro: Alert and oriented to person, place, and time.    Psychological: normal affect, alert, oriented, and pleasant.     Xray:  Results for orders placed or performed in visit on 08/21/23   XR Finger Left G/E 2 Views     Status: None    Narrative    PROCEDURE:  XR FINGER LEFT G/E 2 VIEWS    HISTORY: Injury of left thumb, initial encounter; Pain of left thumb.    COMPARISON:  None.    TECHNIQUE:  3 views left thumb.    FINDINGS:  Moderate focal osteoarthritis of the first interphalangeal  joint.     Bony fragment along the inner margin of the first MCP joint, raising  the possibility of a joint capsular avulsion injury. Correlate for  focal tenderness and laxity and consider follow-up.    SOFIYA JACKSON MD         SYSTEM ID:  [id number]       "

## 2023-10-11 ENCOUNTER — OFFICE VISIT (OUTPATIENT)
Dept: ORTHOPEDICS | Facility: OTHER | Age: 48
End: 2023-10-11
Payer: COMMERCIAL

## 2023-10-11 VITALS
HEART RATE: 70 BPM | OXYGEN SATURATION: 100 % | SYSTOLIC BLOOD PRESSURE: 136 MMHG | DIASTOLIC BLOOD PRESSURE: 88 MMHG | BODY MASS INDEX: 29.95 KG/M2 | WEIGHT: 180 LBS | RESPIRATION RATE: 16 BRPM

## 2023-10-11 DIAGNOSIS — M79.645 PAIN OF LEFT THUMB: ICD-10-CM

## 2023-10-11 DIAGNOSIS — S69.92XA INJURY OF LEFT THUMB, INITIAL ENCOUNTER: ICD-10-CM

## 2023-10-11 DIAGNOSIS — S62.619A CLOSED AVULSION FRACTURE OF PROXIMAL PHALANX OF FINGER, INITIAL ENCOUNTER: ICD-10-CM

## 2023-10-11 PROCEDURE — 99203 OFFICE O/P NEW LOW 30 MIN: CPT | Performed by: ORTHOPAEDIC SURGERY

## 2023-10-11 ASSESSMENT — PAIN SCALES - GENERAL: PAINLEVEL: MILD PAIN (2)

## 2023-10-11 NOTE — PROGRESS NOTES
"Chief Complaint   Patient presents with    Consult     Left thumb pain       Initial /88 (BP Location: Right arm, Patient Position: Sitting, Cuff Size: Adult Regular)   Pulse 70   Resp 16   Wt 81.6 kg (180 lb)   LMP 06/02/2018   SpO2 100%   BMI 29.95 kg/m   Estimated body mass index is 29.95 kg/m  as calculated from the following:    Height as of 8/21/23: 1.651 m (5' 5\").    Weight as of this encounter: 81.6 kg (180 lb).  Medication Reconciliation: complete    Rosenda Castillo LPN    "

## 2023-10-11 NOTE — PROGRESS NOTES
Surgical Clinic Consult  Primary physician:     Hali Foster    Chief complaint:   Left thumb pain    History of present illness:  This is a 48 year old female I am seeing in consultation for left thumb injury after a fall 6 weeks back.  Patient tripped on her dog sustaining hyperextension movement to her MCP joint.  Patient initially did thumb spica bracing.  Patient did have x-rays done which revealed potentially a slight avulsion on the radial aspect of the joint.  Patient does report some occasional stiffness swelling at this time.  Patient is right-hand dominant.  Patient works in the pharmacy.    Past medical history:   Past Medical History:   Diagnosis Date    Personal history of other medical treatment (CODE)     Childbirth       Pastsurgical history:  Past Surgical History:   Procedure Laterality Date    COLONOSCOPY N/A 10/12/2022    hyperplastic - f/u 10 years    DILATION AND CURETTAGE      1992,after SAB    HYSTERECTOMY VAGINAL N/A 08/08/2018    Procedure: HYSTERECTOMY VAGINAL;  Total Vaginal Hysterectomy and Bilateral Salpingectomy, Left oophorectomy;  Surgeon: Demarco Verdugo MD;  Location:  OR    LAPAROSCOPIC NISSEN FUNDOPLICATION N/A 04/21/2021    Procedure: FUNDOPLICATION, NISSEN, LAPAROSCOPIC;  Surgeon: Damien Ferguson MD;  Location:  OR       Current medications:  Current Outpatient Medications   Medication Sig Dispense Refill    amLODIPine (NORVASC) 5 MG tablet Take 1 tablet (5 mg) by mouth daily 90 tablet 3    estradiol (ESTRACE) 0.5 MG tablet Take 1 tablet (0.5 mg) by mouth daily 90 tablet 3    lisinopril (ZESTRIL) 40 MG tablet Take 1 tablet (40 mg) by mouth daily 90 tablet 3    multivitamin w/minerals (THERA-VIT-M) tablet Take 1 tablet by mouth daily      other medical supplies Shingrix.  Diagnosis:  Z23. 1 each 1    SUMAtriptan (IMITREX) 50 MG tablet Take 1 tablet (50 mg) by mouth at onset of headache for migraine May repeat in 2 hours. Max 4 tablets/24 hours. 10 tablet  11       Allergies:  No Known Allergies    Family history:  Family History   Problem Relation Age of Onset    Other - See Comments Mother 65        Celiac disease    Other - See Comments Father         Glaucoma    Family History Negative Sister         Good Health    Family History Negative Sister         Good Health    Family History Negative Brother         Good Health    Family History Negative Brother         Good Health,Twin    Family History Negative Brother         Good Health,Twin - Had GIB    Heart Disease No family hx of         Heart Disease,No known premature CAD    Diabetes No family hx of         Diabetes,DM2    Breast Cancer No family hx of         Cancer-breast    Colon Cancer No family hx of         Cancer-colon       Social history:  Social History     Socioeconomic History    Marital status:      Spouse name: Jitendra    Number of children: Not on file    Years of education: Not on file    Highest education level: Not on file   Occupational History    Not on file   Tobacco Use    Smoking status: Former     Packs/day: .5     Types: Cigarettes     Quit date: 2022     Years since quittin.4     Passive exposure: Never    Smokeless tobacco: Never   Vaping Use    Vaping Use: Never used   Substance and Sexual Activity    Alcohol use: No     Alcohol/week: 1.7 standard drinks of alcohol    Drug use: No    Sexual activity: Yes     Partners: Male     Birth control/protection: Female Surgical     Comment: Hysterectomy    Other Topics Concern    Parent/sibling w/ CABG, MI or angioplasty before 65F 55M? Not Asked   Social History Narrative    Lives with  Jitendra.  Has three grown children, Poonam,  -  at [employer]; Calvin  -   in [place]; Marcelle 1998 - Graduated.   Works at [employer].        Enjoys kayaking during the summer.     Social Determinants of Health     Financial Resource Strain: Not on file   Food Insecurity: Not on  file   Transportation Needs: Not on file   Physical Activity: Not on file   Stress: Not on file   Social Connections: Not on file   Interpersonal Safety: Not on file   Housing Stability: Not on file       PROBLEM LIST:  Patient Active Problem List   Diagnosis    Carpal tunnel syndrome, bilateral    Hiatal hernia    Neck pain, chronic    Excessive or frequent menstruation    Lightheadedness    S/P hysterectomy    Recurrent periodic urticaria    Arthritis of finger of both hands    Gastroesophageal reflux disease with esophagitis without hemorrhage    Dyshidrotic eczema    History of 2019 novel coronavirus disease (COVID-19)    Morbid obesity (H)    Snoring    Essential hypertension    IFG (impaired fasting glucose)    Hip pain, left       Review of Systems:  COMPLETE 12 point REVIEW OF SYSTEMS is otherwise negative with the exception of which is stated above.    Physical exam: /88 (BP Location: Right arm, Patient Position: Sitting, Cuff Size: Adult Regular)   Pulse 70   Resp 16   Wt 81.6 kg (180 lb)   LMP 2018   SpO2 100%   BMI 29.95 kg/m      General: this is a pleasant female patient in no acute distress.  Patient is awake alert and oriented x3 .   EXAM:  Chest/Respiratory Exam: Normal - Clear to auscultation without rales, rhonchi, or wheezing.  Cardiovascular Exam: normal  Musculoskeletal: Left thumb examination shows excellent stability to the MCP joint.  Mild swelling is noted.  Near full range of motion is present.  No signs of triggering are noted.  Wrist range of motion is fully intact as well.    Imagin views left thumb shows minor avulsion radial aspect of MCP joint    Assessment:   Left thumb MCP joint injury    Plan:    Reassurance is at this time.  Symptoms will continue to improve over time.  No surgical stabilization necessary.  Potentially occupational therapy if any limitations in function are noted here.  Anticipate continue improvement over the ensuing 6 weeks.  Follow-up  exam as needed basis.      Rosendo Dumas MD

## 2024-05-30 ENCOUNTER — OFFICE VISIT (OUTPATIENT)
Dept: SURGERY | Facility: OTHER | Age: 49
End: 2024-05-30
Attending: SURGERY
Payer: COMMERCIAL

## 2024-05-30 VITALS
RESPIRATION RATE: 16 BRPM | TEMPERATURE: 97.7 F | SYSTOLIC BLOOD PRESSURE: 120 MMHG | DIASTOLIC BLOOD PRESSURE: 68 MMHG | WEIGHT: 180 LBS | HEART RATE: 77 BPM | OXYGEN SATURATION: 98 % | BODY MASS INDEX: 29.95 KG/M2

## 2024-05-30 DIAGNOSIS — K80.20 SYMPTOMATIC CHOLELITHIASIS: Primary | ICD-10-CM

## 2024-05-30 DIAGNOSIS — Z98.890 S/P LAPAROSCOPIC FUNDOPLICATION: ICD-10-CM

## 2024-05-30 PROCEDURE — 99203 OFFICE O/P NEW LOW 30 MIN: CPT | Performed by: SURGERY

## 2024-05-30 ASSESSMENT — PAIN SCALES - GENERAL: PAINLEVEL: NO PAIN (0)

## 2024-05-30 NOTE — PROGRESS NOTES
Primary Care Physician: Hali Foster MD    A copy of this note will be sent to Hali Foster MD.    HPI:  The patient is 49 year old female with episodes of RUQ pain, with nausea. No vomiting because she has had a Nissen and can't vomit. These have been going on since February. She has been seen 2 times in the ED for the episodes. She had another episode that she didn't go in for. The patient was seen most recently in [place] ED yesterday (5/29/24).  An US was done showing gall stones with no pericholecystic fluid, wall thickening or biliary ductal dilation. The patient notes that she had greasy food that triggered the episodes. When the pain was the worst it radiated into the back . Patient reports some diarrhea after eating but denies light colored stools. Patient hasn't had any dark urine.    CONSULTATION ASSESSMENT AND PLAN/RECOMMENDATIONS:   Gall stones-  I discussed with the patient the pathophysiology of gallbladder disease and gallstones with the patient. We specifically discussed the treament of symptomatic cholelithiasis with laproscopic cholecystectomy. We discussed that I recommend Dr. Ferguson for her cholecystectomy as he did her Nissen. She is in agreement with that. Will forward this note to him. Discussed that if she has persistent pain, fevers, diarrhea, dark urine or light stools, she should be seen urgently. She expressed understanding. The patient will call with questions or concerns. The patient denies questions at this time    REVIEW OF SYSTEMS  GENERAL: No chills. Denies fatigue, recent weight loss-has been trying low carb.  HEENT: No sinus drainage. No changes with vision or hearing. No difficulty swallowing.   LYMPHATICS:  No swollen nodes inaxilla, neck or groin.  CARDIOVASCULAR: Denies chest pain, palpitations and dyspnea on exertion.  PULMONARY: No shortness of breath or cough. No increase in sputum production.  GI: Denies melena, bright red blood instools. No  hematemesis. No constipation or diarrhea.  : No dysuria or hematuria.  SKIN: No recent rashes or ulcers.   HEMATOLOGY:  No history of easy bruising or bleeding.  ENDOCRINE:  No history of diabetes orthyroid problems.  NEUROLOGY:  No history of seizures or headaches. No motor or sensory changes.  Past Medical History:   Diagnosis Date    Personal history of other medical treatment (CODE)     Childbirth     Past Surgical History:   Procedure Laterality Date    COLONOSCOPY N/A 10/12/2022    hyperplastic - f/u 10 years    DILATION AND CURETTAGE      1992,after SAB    HYSTERECTOMY VAGINAL N/A 08/08/2018    Procedure: HYSTERECTOMY VAGINAL;  Total Vaginal Hysterectomy and Bilateral Salpingectomy, Left oophorectomy;  Surgeon: Demarco Verdugo MD;  Location:  OR    LAPAROSCOPIC NISSEN FUNDOPLICATION N/A 04/21/2021    Procedure: FUNDOPLICATION, NISSEN, LAPAROSCOPIC;  Surgeon: Damien Ferguson MD;  Location:  OR     CURRENT MEDS    Current Outpatient Medications   Medication Sig Dispense Refill    amLODIPine (NORVASC) 5 MG tablet Take 1 tablet (5 mg) by mouth daily 90 tablet 3    estradiol (ESTRACE) 0.5 MG tablet Take 1 tablet (0.5 mg) by mouth daily 90 tablet 3    lisinopril (ZESTRIL) 40 MG tablet Take 1 tablet (40 mg) by mouth daily 90 tablet 3    multivitamin w/minerals (THERA-VIT-M) tablet Take 1 tablet by mouth daily      other medical supplies Shingrix.  Diagnosis:  Z23. 1 each 1    SUMAtriptan (IMITREX) 50 MG tablet Take 1 tablet (50 mg) by mouth at onset of headache for migraine May repeat in 2 hours. Max 4 tablets/24 hours. 10 tablet 11     No current facility-administered medications for this visit.       No Known Allergies  Family History   Problem Relation Age of Onset    Other - See Comments Mother 65        Celiac disease    Other - See Comments Father         Glaucoma    Family History Negative Sister         Good Health    Family History Negative Sister         Good Health    Family History Negative  Brother         Good Health    Family History Negative Brother         Good Health,Twin    Family History Negative Brother         Good Health,Twin - Had GIB    Heart Disease No family hx of         Heart Disease,No known premature CAD    Diabetes No family hx of         Diabetes,DM2    Breast Cancer No family hx of         Cancer-breast    Colon Cancer No family hx of         Cancer-colon     Social History     Socioeconomic History    Marital status:      Spouse name: Jitendra    Number of children: None    Years of education: None    Highest education level: None   Tobacco Use    Smoking status: Former     Current packs/day: 0.00     Types: Cigarettes     Quit date: 2022     Years since quittin.0     Passive exposure: Never    Smokeless tobacco: Never   Vaping Use    Vaping status: Never Used   Substance and Sexual Activity    Alcohol use: No     Alcohol/week: 1.7 standard drinks of alcohol    Drug use: No    Sexual activity: Yes     Partners: Male     Birth control/protection: Female Surgical     Comment: Hysterectomy    Social History Narrative    Lives with  Jitendra.  Has three grown children, Poonam,  -  [facility] [employer]; Calvin  - [employer]  in [place]; Marcelle,  - Graduated.   Works at [employer].        Enjoys [employer] during the summer.     Social Determinants of Health     Interpersonal Safety: Low Risk  (2024)    Interpersonal Safety     Do you feel physically and emotionally safe where you currently live?: Yes     Within the past 12 months, have you been hit, slapped, kicked or otherwise physically hurt by someone?: No     Within the past 12 months, have you been humiliated or emotionally abused in other ways by your partner or ex-partner?: No        The above history was reviewed 2024.  PHYSICAL EXAM  Vitals: /68 (BP Location: Right arm, Patient Position: Sitting, Cuff Size: Adult Large)   Pulse 77   Temp 97.7  F  (36.5  C) (Temporal)   Resp 16   Wt 81.6 kg (180 lb)   LMP 06/02/2018   SpO2 98%   BMI 29.95 kg/m    BMI= Body mass index is 29.95 kg/m .  GENERAL: Healthy appearing patient in no acute distress. Pleasant and cooperative with exam and interview.   HEENT: Head-normocephalic. Eyes-no scleral icterus, pupils equal, round, and reactive to light. Nose-no nasal drainage. No lesions. Mouth-oral mucosa pink and moist, no lesions.  NECK: Supple. No thyroid nodules. Trachea midline.  LYMPHATICS:  No cervical, axillary or supraclavicular adenopathy.  ABDOMEN: Non distended. Bowel sounds active. Soft, non-tender, no hepatosplenomegaly or hernias. No peritoneal signs. Negative Little's sign.   SKIN: [place], warm and dry. Nojaundice. No rash.  NEURO:  Cranial nerves II-XII grossly intact. Alert and oriented.  PSYCH: Appropriate mood and affect.    IMAGING/LAB  I personally reviewed patient's lab results and US and 2/24 CT reports, images not available-studies done in [place].

## 2024-05-30 NOTE — PATIENT INSTRUCTIONS
If you have concerns about a gallstone being stuck-fever, dark urine, light stools, pain that is severe and doesn't go away-please call!   I recommend a visit with Dr. Ferguson to discuss removal of your gall bladder.   Call for concerns or questions!

## 2024-05-30 NOTE — Clinical Note
Unit 4 schedulers-can you please schedule this patient with Dr. Das to discuss gall bladder surgery? Thanks!  Denise Lou MD on 5/30/2024 at 11:35 AM

## 2024-05-30 NOTE — NURSING NOTE
"Patient comes in for abdominal pain consult.    Chief Complaint   Patient presents with    Consult     Upper abdomin pain       Initial /68 (BP Location: Right arm, Patient Position: Sitting, Cuff Size: Adult Large)   Pulse 77   Temp 97.7  F (36.5  C) (Temporal)   Resp 16   Wt 81.6 kg (180 lb)   LMP 06/02/2018   SpO2 98%   BMI 29.95 kg/m   Estimated body mass index is 29.95 kg/m  as calculated from the following:    Height as of 8/21/23: 1.651 m (5' 5\").    Weight as of this encounter: 81.6 kg (180 lb).  Meds Reconciled: complete    PHQ and/or KRUNAL reviewed. Pt referred to PCP/MH Provider as appropriate.    Sudha Mendoza LPN      "
Statement Selected

## 2024-06-03 ENCOUNTER — NURSE TRIAGE (OUTPATIENT)
Dept: SURGERY | Facility: OTHER | Age: 49
End: 2024-06-03
Payer: COMMERCIAL

## 2024-06-03 ENCOUNTER — MYC MEDICAL ADVICE (OUTPATIENT)
Dept: SURGERY | Facility: OTHER | Age: 49
End: 2024-06-03
Payer: COMMERCIAL

## 2024-06-03 ENCOUNTER — HOSPITAL ENCOUNTER (EMERGENCY)
Facility: OTHER | Age: 49
Discharge: HOME | End: 2024-06-03
Payer: COMMERCIAL

## 2024-06-03 VITALS
RESPIRATION RATE: 16 BRPM | HEIGHT: 65 IN | BODY MASS INDEX: 29.97 KG/M2 | OXYGEN SATURATION: 100 % | DIASTOLIC BLOOD PRESSURE: 85 MMHG | HEART RATE: 83 BPM | TEMPERATURE: 97.8 F | WEIGHT: 179.9 LBS | SYSTOLIC BLOOD PRESSURE: 150 MMHG

## 2024-06-03 DIAGNOSIS — R74.8 ELEVATED ALKALINE PHOSPHATASE LEVEL: ICD-10-CM

## 2024-06-03 DIAGNOSIS — R79.89 ELEVATED LFTS: ICD-10-CM

## 2024-06-03 DIAGNOSIS — R53.83 FATIGUE, UNSPECIFIED TYPE: ICD-10-CM

## 2024-06-03 DIAGNOSIS — R82.998 ORANGE-COLORED URINE: ICD-10-CM

## 2024-06-03 LAB
ALBUMIN SERPL BCG-MCNC: 4.7 G/DL (ref 3.5–5.2)
ALBUMIN UR-MCNC: NEGATIVE MG/DL
ALP SERPL-CCNC: 209 U/L (ref 40–150)
ALT SERPL W P-5'-P-CCNC: 436 U/L (ref 0–50)
ANION GAP SERPL CALCULATED.3IONS-SCNC: 12 MMOL/L (ref 7–15)
APPEARANCE UR: CLEAR
AST SERPL W P-5'-P-CCNC: 127 U/L (ref 0–45)
BACTERIA #/AREA URNS HPF: ABNORMAL /HPF
BASOPHILS # BLD AUTO: 0 10E3/UL (ref 0–0.2)
BASOPHILS NFR BLD AUTO: 1 %
BILIRUB DIRECT SERPL-MCNC: 0.43 MG/DL (ref 0–0.3)
BILIRUB SERPL-MCNC: 1 MG/DL
BILIRUB UR QL STRIP: NEGATIVE
BUN SERPL-MCNC: 6.3 MG/DL (ref 6–20)
CALCIUM SERPL-MCNC: 9.5 MG/DL (ref 8.6–10)
CHLORIDE SERPL-SCNC: 101 MMOL/L (ref 98–107)
COLOR UR AUTO: ABNORMAL
CREAT SERPL-MCNC: 0.65 MG/DL (ref 0.51–0.95)
CRP SERPL-MCNC: 10.73 MG/L
DEPRECATED HCO3 PLAS-SCNC: 25 MMOL/L (ref 22–29)
EGFRCR SERPLBLD CKD-EPI 2021: >90 ML/MIN/1.73M2
EOSINOPHIL # BLD AUTO: 0.1 10E3/UL (ref 0–0.7)
EOSINOPHIL NFR BLD AUTO: 1 %
ERYTHROCYTE [DISTWIDTH] IN BLOOD BY AUTOMATED COUNT: 13 % (ref 10–15)
GLUCOSE SERPL-MCNC: 91 MG/DL (ref 70–99)
GLUCOSE UR STRIP-MCNC: NEGATIVE MG/DL
HCT VFR BLD AUTO: 39.9 % (ref 35–47)
HGB BLD-MCNC: 12.8 G/DL (ref 11.7–15.7)
HGB UR QL STRIP: NEGATIVE
HOLD SPECIMEN: NORMAL
IMM GRANULOCYTES # BLD: 0 10E3/UL
IMM GRANULOCYTES NFR BLD: 0 %
KETONES UR STRIP-MCNC: ABNORMAL MG/DL
LEUKOCYTE ESTERASE UR QL STRIP: NEGATIVE
LIPASE SERPL-CCNC: 34 U/L (ref 13–60)
LYMPHOCYTES # BLD AUTO: 1.9 10E3/UL (ref 0.8–5.3)
LYMPHOCYTES NFR BLD AUTO: 39 %
MAGNESIUM SERPL-MCNC: 2.3 MG/DL (ref 1.7–2.3)
MCH RBC QN AUTO: 28.4 PG (ref 26.5–33)
MCHC RBC AUTO-ENTMCNC: 32.1 G/DL (ref 31.5–36.5)
MCV RBC AUTO: 89 FL (ref 78–100)
MONOCYTES # BLD AUTO: 0.3 10E3/UL (ref 0–1.3)
MONOCYTES NFR BLD AUTO: 6 %
MUCOUS THREADS #/AREA URNS LPF: PRESENT /LPF
NEUTROPHILS # BLD AUTO: 2.6 10E3/UL (ref 1.6–8.3)
NEUTROPHILS NFR BLD AUTO: 53 %
NITRATE UR QL: NEGATIVE
NRBC # BLD AUTO: 0 10E3/UL
NRBC BLD AUTO-RTO: 0 /100
PH UR STRIP: 5.5 [PH] (ref 5–9)
PLATELET # BLD AUTO: 257 10E3/UL (ref 150–450)
POTASSIUM SERPL-SCNC: 3.7 MMOL/L (ref 3.4–5.3)
PROT SERPL-MCNC: 8.1 G/DL (ref 6.4–8.3)
RBC # BLD AUTO: 4.5 10E6/UL (ref 3.8–5.2)
RBC URINE: <1 /HPF
SODIUM SERPL-SCNC: 138 MMOL/L (ref 135–145)
SP GR UR STRIP: 1.01 (ref 1–1.03)
SQUAMOUS EPITHELIAL: 3 /HPF
UROBILINOGEN UR STRIP-MCNC: NORMAL MG/DL
WBC # BLD AUTO: 4.9 10E3/UL (ref 4–11)
WBC URINE: 1 /HPF

## 2024-06-03 PROCEDURE — 83690 ASSAY OF LIPASE: CPT

## 2024-06-03 PROCEDURE — 250N000013 HC RX MED GY IP 250 OP 250 PS 637

## 2024-06-03 PROCEDURE — 83735 ASSAY OF MAGNESIUM: CPT

## 2024-06-03 PROCEDURE — 99283 EMERGENCY DEPT VISIT LOW MDM: CPT

## 2024-06-03 PROCEDURE — 82248 BILIRUBIN DIRECT: CPT

## 2024-06-03 PROCEDURE — 82040 ASSAY OF SERUM ALBUMIN: CPT

## 2024-06-03 PROCEDURE — 81001 URINALYSIS AUTO W/SCOPE: CPT | Performed by: STUDENT IN AN ORGANIZED HEALTH CARE EDUCATION/TRAINING PROGRAM

## 2024-06-03 PROCEDURE — 36415 COLL VENOUS BLD VENIPUNCTURE: CPT

## 2024-06-03 PROCEDURE — 99284 EMERGENCY DEPT VISIT MOD MDM: CPT

## 2024-06-03 PROCEDURE — 86140 C-REACTIVE PROTEIN: CPT

## 2024-06-03 PROCEDURE — 85049 AUTOMATED PLATELET COUNT: CPT

## 2024-06-03 PROCEDURE — 81001 URINALYSIS AUTO W/SCOPE: CPT

## 2024-06-03 RX ORDER — CIPROFLOXACIN 500 MG/1
500 TABLET, FILM COATED ORAL ONCE
Status: COMPLETED | OUTPATIENT
Start: 2024-06-03 | End: 2024-06-03

## 2024-06-03 RX ORDER — CIPROFLOXACIN 500 MG/1
500 TABLET, FILM COATED ORAL 2 TIMES DAILY
Qty: 10 TABLET | Refills: 0 | Status: SHIPPED | OUTPATIENT
Start: 2024-06-03 | End: 2024-06-08

## 2024-06-03 RX ADMIN — CIPROFLOXACIN HYDROCHLORIDE 500 MG: 250 TABLET, FILM COATED ORAL at 18:37

## 2024-06-03 ASSESSMENT — ACTIVITIES OF DAILY LIVING (ADL)
ADLS_ACUITY_SCORE: 37
ADLS_ACUITY_SCORE: 35

## 2024-06-03 ASSESSMENT — COLUMBIA-SUICIDE SEVERITY RATING SCALE - C-SSRS
6. HAVE YOU EVER DONE ANYTHING, STARTED TO DO ANYTHING, OR PREPARED TO DO ANYTHING TO END YOUR LIFE?: NO
2. HAVE YOU ACTUALLY HAD ANY THOUGHTS OF KILLING YOURSELF IN THE PAST MONTH?: NO
1. IN THE PAST MONTH, HAVE YOU WISHED YOU WERE DEAD OR WISHED YOU COULD GO TO SLEEP AND NOT WAKE UP?: NO

## 2024-06-03 ASSESSMENT — ENCOUNTER SYMPTOMS
CHILLS: 1
FEVER: 0
NAUSEA: 1
FATIGUE: 1
VOMITING: 0

## 2024-06-03 NOTE — DISCHARGE INSTRUCTIONS
Please call the clinic tomorrow to schedule an appointment with the surgeon.     Cipro (antibiotic) twice daily x 5 days. Prescription sent to your pharmacy.     Please return to the ED, if you experience fever, worsening abdominal pain, vomiting or any new or worsening symptoms.

## 2024-06-03 NOTE — ED TRIAGE NOTES
Patient comes in with concerns with her gallbladder.  Was seen last Wednesday in  and had an US done and was told she had gallstones.  She saw Carmine on Thursday and was told to come in if anything changes, but otherwise is scheduled with Germshied mid June.  Over the weekend her urine changed to dark orange (has been drinking fluids), had a gallbladder attack on Friday, broke out in hives but taht has since resolved.  This morning she is feeling lethargic, shaky, and weak.     Triage Assessment (Adult)       Row Name 06/03/24 1312          Triage Assessment    Airway WDL WDL        Respiratory WDL    Respiratory WDL WDL        Skin Circulation/Temperature WDL    Skin Circulation/Temperature WDL WDL        Cardiac WDL    Cardiac WDL WDL        Peripheral/Neurovascular WDL    Peripheral Neurovascular WDL WDL        Cognitive/Neuro/Behavioral WDL    Cognitive/Neuro/Behavioral WDL WDL

## 2024-06-03 NOTE — ED PROVIDER NOTES
"  History     Chief Complaint   Patient presents with    Abdominal Pain     HPI  Yanna Ordonez is a 49 year old female with PMH essential hypertension, GERD, hiatal hernia, obesity, s/p Nissen fundoplication 4/2021 who presents today feeling very tired, wiped out, and dark orange colored urine. Dark orange colored urine started on Saturday. She reports having a \"gallbladder\" attack on Friday night. Her abdominal pain resolved Saturday morning. She currently denies abdominal pain. No nausea or vomiting. She reports she is not able to vomit after having Nissen procedure. She denies fevers. When giving a urine sample today her urine looks back to normal. She was diagnosed with gallstones at [facility] emergency room last Wednesday. She has a scheduled appointment with Dr. Mohan on 6/13/24 to discuss cholecystectomy.     Allergies:  No Known Allergies    Problem List:    Patient Active Problem List    Diagnosis Date Noted    Hip pain, left 04/07/2021     Priority: Medium    Snoring 03/15/2021     Priority: Medium    Essential hypertension 03/15/2021     Priority: Medium    IFG (impaired fasting glucose) 03/15/2021     Priority: Medium    Morbid obesity (H) 02/26/2021     Priority: Medium    Recurrent periodic urticaria 01/07/2021     Priority: Medium    Arthritis of finger of both hands 01/07/2021     Priority: Medium    Gastroesophageal reflux disease with esophagitis without hemorrhage 01/07/2021     Priority: Medium    Dyshidrotic eczema 01/07/2021     Priority: Medium    History of 2019 novel coronavirus disease (COVID-19) 01/07/2021     Priority: Medium    S/P hysterectomy 08/08/2018     Priority: Medium    Excessive or frequent menstruation 03/21/2018     Priority: Medium    Lightheadedness 03/21/2018     Priority: Medium    Hiatal hernia 02/11/2018     Priority: Medium    Neck pain, chronic 02/11/2018     Priority: Medium    Carpal tunnel syndrome, bilateral 10/10/2016     Priority: Medium        Past " Medical History:    Past Medical History:   Diagnosis Date    Personal history of other medical treatment (CODE)        Past Surgical History:    Past Surgical History:   Procedure Laterality Date    COLONOSCOPY N/A 10/12/2022    hyperplastic - f/u 10 years    DILATION AND CURETTAGE      ,after SAB    HYSTERECTOMY VAGINAL N/A 2018    Procedure: HYSTERECTOMY VAGINAL;  Total Vaginal Hysterectomy and Bilateral Salpingectomy, Left oophorectomy;  Surgeon: Demarco Verdugo MD;  Location:  OR    LAPAROSCOPIC NISSEN FUNDOPLICATION N/A 2021    Procedure: FUNDOPLICATION, NISSEN, LAPAROSCOPIC;  Surgeon: Damien Ferguson MD;  Location:  OR       Family History:    Family History   Problem Relation Age of Onset    Other - See Comments Mother 65        Celiac disease    Other - See Comments Father         Glaucoma    Family History Negative Sister         Good Health    Family History Negative Sister         Good Health    Family History Negative Brother         Good Health    Family History Negative Brother         Good Health,Twin    Family History Negative Brother         Good Health,Twin - Had GIB    Heart Disease No family hx of         Heart Disease,No known premature CAD    Diabetes No family hx of         Diabetes,DM2    Breast Cancer No family hx of         Cancer-breast    Colon Cancer No family hx of         Cancer-colon       Social History:  Marital Status:   [2]  Social History     Tobacco Use    Smoking status: Former     Current packs/day: 0.00     Types: Cigarettes     Quit date: 2022     Years since quittin.0     Passive exposure: Never    Smokeless tobacco: Never   Vaping Use    Vaping status: Never Used   Substance Use Topics    Alcohol use: No     Alcohol/week: 1.7 standard drinks of alcohol    Drug use: No        Medications:    ciprofloxacin (CIPRO) 500 MG tablet  amLODIPine (NORVASC) 5 MG tablet  estradiol (ESTRACE) 0.5 MG tablet  lisinopril (ZESTRIL) 40 MG  "tablet  multivitamin w/minerals (THERA-VIT-M) tablet  other medical supplies  SUMAtriptan (IMITREX) 50 MG tablet        Review of Systems   Constitutional:  Positive for chills and fatigue. Negative for fever.   Gastrointestinal:  Positive for nausea. Negative for vomiting.   Genitourinary:         Dark orange urine    Skin:  Positive for rash.       Physical Exam   BP: (!) 150/85  Pulse: 83  Temp: 97.8  F (36.6  C)  Resp: 16  Height: 165.1 cm (5' 5\")  Weight: 81.6 kg (179 lb 14.3 oz)  SpO2: 100 %      Physical Exam  Vitals and nursing note reviewed.   Constitutional:       General: She is not in acute distress.     Appearance: She is well-developed. She is not ill-appearing.   HENT:      Head: Normocephalic.      Right Ear: Tympanic membrane normal.      Left Ear: Tympanic membrane normal.      Nose: Nose normal.      Mouth/Throat:      Mouth: Mucous membranes are moist.      Pharynx: No posterior oropharyngeal erythema.   Eyes:      Conjunctiva/sclera: Conjunctivae normal.   Cardiovascular:      Rate and Rhythm: Normal rate and regular rhythm.      Pulses: Normal pulses.      Heart sounds: Normal heart sounds.   Pulmonary:      Effort: Pulmonary effort is normal.      Breath sounds: Normal breath sounds.   Abdominal:      General: Bowel sounds are normal. There is no distension.      Palpations: Abdomen is soft.      Tenderness: There is no abdominal tenderness.   Musculoskeletal:         General: Normal range of motion.   Skin:     General: Skin is warm and dry.      Capillary Refill: Capillary refill takes less than 2 seconds.      Findings: Rash present.      Comments: Scattered red spots to bilateral ankle region. Rash started after shaving her legs. No surrounding erythema. No jaundice.    Neurological:      General: No focal deficit present.      Mental Status: She is alert and oriented to person, place, and time. Mental status is at baseline.   Psychiatric:         Mood and Affect: Mood normal.        "     Results for orders placed or performed during the hospital encounter of 06/03/24 (from the past 24 hour(s))   UA with Microscopic reflex to Culture    Specimen: Urine, Midstream   Result Value Ref Range    Color Urine Light Yellow Colorless, Straw, Light Yellow, Yellow    Appearance Urine Clear Clear    Glucose Urine Negative Negative mg/dL    Bilirubin Urine Negative Negative    Ketones Urine Trace (A) Negative mg/dL    Specific Gravity Urine 1.006 1.000 - 1.030    Blood Urine Negative Negative    pH Urine 5.5 5.0 - 9.0    Protein Albumin Urine Negative Negative mg/dL    Urobilinogen Urine Normal Normal, 2.0 mg/dL    Nitrite Urine Negative Negative    Leukocyte Esterase Urine Negative Negative    Bacteria Urine Few (A) None Seen /HPF    Mucus Urine Present (A) None Seen /LPF    RBC Urine <1 <=2 /HPF    WBC Urine 1 <=5 /HPF    Squamous Epithelials Urine 3 (H) <=1 /HPF    Narrative    Urine Culture not indicated   [place] Draw    Narrative    The following orders were created for panel order [place] Draw.  Procedure                               Abnormality         Status                     ---------                               -----------         ------                     Extra Blue Top Tube[[id number]]                              Final result               Extra Red Top Tube[[id number]]                               Final result               Extra Green Top (Lithium...[[id number]]                      Final result               Extra Purple Top Tube[[id number]]                            Final result               Extra Green Top (Lithium...[[id number]]                      Final result                 Please view results for these tests on the individual orders.   CBC with platelets differential    Narrative    The following orders were created for panel order CBC with platelets differential.  Procedure                               Abnormality         Status                     ---------                                -----------         ------                     CBC with platelets and d...[[id number]]                      Final result                 Please view results for these tests on the individual orders.   Comprehensive metabolic panel   Result Value Ref Range    Sodium 138 135 - 145 mmol/L    Potassium 3.7 3.4 - 5.3 mmol/L    Carbon Dioxide (CO2) 25 22 - 29 mmol/L    Anion Gap 12 7 - 15 mmol/L    Urea Nitrogen 6.3 6.0 - 20.0 mg/dL    Creatinine 0.65 0.51 - 0.95 mg/dL    GFR Estimate >90 >60 mL/min/1.73m2    Calcium 9.5 8.6 - 10.0 mg/dL    Chloride 101 98 - 107 mmol/L    Glucose 91 70 - 99 mg/dL    Alkaline Phosphatase 209 (H) 40 - 150 U/L     (H) 0 - 45 U/L     (H) 0 - 50 U/L    Protein Total 8.1 6.4 - 8.3 g/dL    Albumin 4.7 3.5 - 5.2 g/dL    Bilirubin Total 1.0 <=1.2 mg/dL   Lipase   Result Value Ref Range    Lipase 34 13 - 60 U/L   Magnesium   Result Value Ref Range    Magnesium 2.3 1.7 - 2.3 mg/dL   CRP inflammation   Result Value Ref Range    CRP Inflammation 10.73 (H) <5.00 mg/L   Bilirubin direct   Result Value Ref Range    Bilirubin Direct 0.43 (H) 0.00 - 0.30 mg/dL   Extra Blue Top Tube   Result Value Ref Range    Hold Specimen x    Extra Red Top Tube   Result Value Ref Range    Hold Specimen x    Extra Green Top (Lithium Heparin) Tube   Result Value Ref Range    Hold Specimen x    Extra Purple Top Tube   Result Value Ref Range    Hold Specimen x    Extra Green Top (Lithium Heparin) ON ICE   Result Value Ref Range    Hold Specimen x    CBC with platelets and differential   Result Value Ref Range    WBC Count 4.9 4.0 - 11.0 10e3/uL    RBC Count 4.50 3.80 - 5.20 10e6/uL    Hemoglobin 12.8 11.7 - 15.7 g/dL    Hematocrit 39.9 35.0 - 47.0 %    MCV 89 78 - 100 fL    MCH 28.4 26.5 - 33.0 pg    MCHC 32.1 31.5 - 36.5 g/dL    RDW 13.0 10.0 - 15.0 %    Platelet Count 257 150 - 450 10e3/uL    % Neutrophils 53 %    % Lymphocytes 39 %    % Monocytes 6 %    % Eosinophils 1 %    % Basophils 1 %    %  "Immature Granulocytes 0 %    NRBCs per 100 WBC 0 <1 /100    Absolute Neutrophils 2.6 1.6 - 8.3 10e3/uL    Absolute Lymphocytes 1.9 0.8 - 5.3 10e3/uL    Absolute Monocytes 0.3 0.0 - 1.3 10e3/uL    Absolute Eosinophils 0.1 0.0 - 0.7 10e3/uL    Absolute Basophils 0.0 0.0 - 0.2 10e3/uL    Absolute Immature Granulocytes 0.0 <=0.4 10e3/uL    Absolute NRBCs 0.0 10e3/uL       Medications   ciprofloxacin (CIPRO) tablet 500 mg (500 mg Oral $Given 6/3/24 1837)       Assessments & Plan (with Medical Decision Making)  Yanna Ordonez is a 49 year old female with PMH essential hypertension, GERD, hiatal hernia, obesity, s/p Nissen fundoplication 4/2021 who presents today feeling very tired, wiped out, and dark orange colored urine. Dark orange colored urine started on Saturday. She reports having a \"gallbladder\" attack on Friday night. Her abdominal pain resolved Saturday morning. She currently denies abdominal pain. No nausea or vomiting. She reports she is not able to vomit after having Nissen procedure. She denies fevers. When giving a urine sample today her urine looks back to normal. She was diagnosed with gallstones at [place] emergency room last Wednesday. She has a scheduled appointment with Dr. Mohan on 6/13/24 to discuss cholecystectomy.   VS in the ED. BP (!) 150/85   Pulse 83   Temp 97.8  F (36.6  C) (Tympanic)   Resp 16   Ht 1.651 m (5' 5\")   Wt 81.6 kg (179 lb 14.3 oz)   LMP 06/02/2018   SpO2 100%   BMI 29.94 kg/m  Awake, alert, and conversant in no acute distress. Skin pink warm and dry. No jaundice. Abdomen soft and non-tender. Negative Little's sign.   Diagnostics:    Lab: CBC- normal. UA- color urine is light yellow. UA-does not imply UTI. Lipase- normal. CRP- elevated 10.73. Bilirubin direct- elevated 0.43. Magnesium- normal. CMP- Alk phos- elevated 209. AST- 127. ALT- 436.     ED Course as of 06/03/24 1925 Mon Jun 03, 2024   3390 Consulted with Dr. Burnett. We discussed the case and labs. She " "has a scheduled appointment with Dr. White on 6/13/24. She can call clinic to see if a sooner appointment with a surgeon is available. Recommends returning to the ED if fever, vomiting, and worsening abdominal pain.    1807 Alkaline Phosphatase(!): 209   1807 AST(!): 127   1807 ALT(!): 436   1807 Bilirubin Direct(!): 0.43   1807 CRP Inflammation(!): 10.73   1827 Consulted with Dr. Burnett. He does not feel it would be necessary to get a repeat ultrasound. It is possible that she passed a stone. Given her CRP is elevated can give Cipro 500mg twice daily for 5 days, it might make her feel better.  Recommended low-fat diet.      Yanna is a 48 y/o female evaluated today for dark orange colored urine and feeling \"wiped out\" concerns. She was diagnosed with cholelithiasis last Wednesday. Differential diagnoses considered but not limited to include biliary obstruction, cholecystitis, pancreatitis. On exam abdomen is soft, non-tender with a negative rmaos's sign. No jaundice. No leukocytosis and she is afebrile. Her urine is now yellow in color. She is tolerating oral fluids. Decreased appetite since Friday's \"attack.\" Her liver function tests are elevated today. When she was seen in [place] ED 5 days ago they were normal. Her CRP is also elevated today. Her total bilirubin is normal today.  I suspect that she passed a gallstone Friday night resulting in gallbladder inflammation. I consulted with general surgery who recommends close follow up and could call the clinic to see if sooner appointment is available. Recommends low fat diet. Cipro twice a day for 5 days given elevated CRP and feeling wiped out. Recommends returning to the ED if she develops a fever, vomiting, and worsening abdominal pain. No need for further imaging or repeating ultrasound as she is currently afebrile, pain free, total bilirubin is normal, and no leukocytosis. She will call the clinic tomorrow am and see if she can get in with a surgeon " sooner than 6/13/24. After shared decision making, the patient is comfortable with discharging home. Return to ED precautions discussed. Verbalizes understanding of discharge plan.      I have reviewed the nursing notes.    I have reviewed the findings, diagnosis, plan and need for follow up with the patient.    Medical Decision Making  The patient's presentation was of moderate complexity (a chronic illness mild to moderate exacerbation, progression, or side effect of treatment).    The patient's evaluation involved:  an assessment requiring an independent historian (see separate area of note for details)  review of external note(s) from 3+ sources (see separate area of note for details)  review of 3+ test result(s) ordered prior to this encounter (see separate area of note for details)  strong consideration of a test (see separate area of note for details) that was ultimately deferred  ordering and/or review of 3+ test(s) in this encounter (see separate area of note for details)    The patient's management necessitated moderate risk (prescription drug management including medications given in the ED).    Discharge Medication List as of 6/3/2024  7:00 PM        START taking these medications    Details   ciprofloxacin (CIPRO) 500 MG tablet Take 1 tablet (500 mg) by mouth 2 times daily for 5 days, Disp-10 tablet, R-0, E-Prescribe           Final diagnoses:   Orange-colored urine   Elevated alkaline phosphatase level   Elevated LFTs   Fatigue, unspecified type     6/3/2024   [facility] AND [facility] Taylor, ANGEL BOYD  06/04/24 2822

## 2024-06-03 NOTE — TELEPHONE ENCOUNTER
"[email or web address] message 6/3/2024 11:10 am    Shashi reyna worked me in last week and I so appreciate that. I had a pretty rough weekend and I did [employer] message my primary Hali Kristin but have not heard anything back. My urine has turned a dark orange and my stool this morning was a light edie color. I broke out in hives around 3am Saturday morning which those have gone away. I just feel really weak and tired today and not sure what my next step should be.     S-(situation): Dark Orange urine /  Light edie colored stools / Hives / Weak / Tired    B-(background): Patient was seen in the ED on 5/29/2024 and in clinic by Dr. Lou on 5/30/2024 for Symptomatic cholelithiasis.     A-(assessment): Since visit with Dr. Lou patient has not have the dark urine. In office note with Dr. Lou.     Saturday the dark urine started (yellowish/orange). No high fevers that she is aware of but reports last she checked it has been running on the low side. No pain. Poor appetite. Did void this morning but notes it is less than normal. Reports just doesn't feel right.     \"We discussed that I recommend Dr. Ferguson for her cholecystectomy as he did her Nissen. She is in agreement with that. Will forward this note to him. Discussed that if she has persistent pain, fevers, diarrhea, dark urine or light stools, she should be seen urgently. She expressed understanding. \"    R-(recommendations): Discussed for patient to go to the ED as no General Surgeon in clinic today. Phone message out to General Surgeon who works with Dr. Lou.     Spoke with Dr. Burnett for second level triage. He agrees patient should come in to be evaluated. Updated the patient and she is agreeable to go in to be evaluated.     Yuly Moreno RN  ....................  6/3/2024   12:15 PM      Reason for Disposition   Decreased urination and drinking very little and dehydration suspected (e.g., dark urine, no urine > 12 hours, very dry mouth, very lightheaded)   [1] " Stool is light gray or whitish AND [2] unexplained    Additional Information   Negative: Fever > 100.4 F  (38.0 C)   Negative: Can't control passage of urine (i.e., urinary incontinence) and new-onset (< 2 weeks) or worsening   Negative: Urinating more frequently than usual (i.e., frequency)   Negative: Bad or foul-smelling urine    Protocols used: Urinary Symptoms-A-OH, Stools - Unusual Color-A-AH

## 2024-06-04 ENCOUNTER — OFFICE VISIT (OUTPATIENT)
Dept: SURGERY | Facility: OTHER | Age: 49
End: 2024-06-04
Attending: SURGERY
Payer: COMMERCIAL

## 2024-06-04 VITALS
WEIGHT: 179.8 LBS | RESPIRATION RATE: 18 BRPM | BODY MASS INDEX: 29.96 KG/M2 | HEIGHT: 65 IN | SYSTOLIC BLOOD PRESSURE: 138 MMHG | DIASTOLIC BLOOD PRESSURE: 76 MMHG | TEMPERATURE: 98.4 F | OXYGEN SATURATION: 99 % | HEART RATE: 77 BPM

## 2024-06-04 DIAGNOSIS — K80.10 CHRONIC CHOLECYSTITIS WITH CALCULUS: Primary | ICD-10-CM

## 2024-06-04 PROBLEM — N92.0 EXCESSIVE OR FREQUENT MENSTRUATION: Status: RESOLVED | Noted: 2018-03-21 | Resolved: 2024-06-04

## 2024-06-04 PROCEDURE — 99214 OFFICE O/P EST MOD 30 MIN: CPT | Performed by: SURGERY

## 2024-06-04 RX ORDER — ACETAMINOPHEN 325 MG/1
975 TABLET ORAL ONCE
Status: CANCELLED | OUTPATIENT
Start: 2024-06-04 | End: 2024-06-04

## 2024-06-04 ASSESSMENT — PAIN SCALES - GENERAL: PAINLEVEL: NO PAIN (0)

## 2024-06-04 NOTE — PROGRESS NOTES
Surgical Clinic Consult  Referring physician:  SATYA Samuels  Primary physician:     Hali Foster    Chief complaint:   Chronic cholecystitis/cholelithiasis    History of present illness:  This is a 49 year old female I am seeing in consultation for chronic cholecystitis/cholelithiasis.  The patient has had 4 episodes of right upper quadrant abdominal pain.  The first was in the chest and radiated to the back such that she thought she was having a heart attack.  The last attacks have been lower in the right upper quadrant.  These were after eating fatty foods.  Ultrasound confirms gallstones no wall thickening.  After her last attack on 5/31/2024 she noticed some orange urine.  Bilirubin was normal yesterday.  She feels better today after receiving antibiotics in the emergency room and taking it this morning.  CBC and lipase were normal.     Past medical history:   Past Medical History:   Diagnosis Date    Personal history of other medical treatment (CODE)     Childbirth       Pastsurgical history:  Past Surgical History:   Procedure Laterality Date    COLONOSCOPY N/A 10/12/2022    hyperplastic - f/u 10 years    DILATION AND CURETTAGE      1992,after SAB    HYSTERECTOMY VAGINAL N/A 08/08/2018    Procedure: HYSTERECTOMY VAGINAL;  Total Vaginal Hysterectomy and Bilateral Salpingectomy, Left oophorectomy;  Surgeon: Demarco Verdugo MD;  Location:  OR    LAPAROSCOPIC NISSEN FUNDOPLICATION N/A 04/21/2021    Procedure: FUNDOPLICATION, NISSEN, LAPAROSCOPIC;  Surgeon: Damien Ferguson MD;  Location:  OR       Current medications:  Current Outpatient Medications   Medication Sig Dispense Refill    amLODIPine (NORVASC) 5 MG tablet Take 1 tablet (5 mg) by mouth daily 90 tablet 3    ciprofloxacin (CIPRO) 500 MG tablet Take 1 tablet (500 mg) by mouth 2 times daily for 5 days 10 tablet 0    estradiol (ESTRACE) 0.5 MG tablet Take 1 tablet (0.5 mg) by mouth daily 90 tablet 3    lisinopril (ZESTRIL) 40 MG tablet  Take 1 tablet (40 mg) by mouth daily 90 tablet 3    multivitamin w/minerals (THERA-VIT-M) tablet Take 1 tablet by mouth daily      other medical supplies Shingrix.  Diagnosis:  Z23. 1 each 1    SUMAtriptan (IMITREX) 50 MG tablet Take 1 tablet (50 mg) by mouth at onset of headache for migraine May repeat in 2 hours. Max 4 tablets/24 hours. 10 tablet 11       Allergies:  No Known Allergies    Family history:  Family History   Problem Relation Age of Onset    Other - See Comments Mother 65        Celiac disease    Other - See Comments Father         Glaucoma    Family History Negative Sister         Good Health    Family History Negative Sister         Good Health    Family History Negative Brother         Good Health    Family History Negative Brother         Good Health,Twin    Family History Negative Brother         Good Health,Twin - Had GIB    Heart Disease No family hx of         Heart Disease,No known premature CAD    Diabetes No family hx of         Diabetes,DM2    Breast Cancer No family hx of         Cancer-breast    Colon Cancer No family hx of         Cancer-colon       Social history:  Social History     Socioeconomic History    Marital status:      Spouse name: Jitendra    Number of children: Not on file    Years of education: Not on file    Highest education level: Not on file   Occupational History    Not on file   Tobacco Use    Smoking status: Former     Current packs/day: 0.00     Types: Cigarettes     Quit date: 2022     Years since quittin.0     Passive exposure: Never    Smokeless tobacco: Never   Vaping Use    Vaping status: Never Used   Substance and Sexual Activity    Alcohol use: No     Alcohol/week: 1.7 standard drinks of alcohol    Drug use: No    Sexual activity: Yes     Partners: Male     Birth control/protection: Female Surgical     Comment: Hysterectomy    Other Topics Concern    Parent/sibling w/ CABG, MI or angioplasty before 65F 55M? Not Asked   Social History  Narrative    Lives with  Jitendra.  Has three grown children, Poonam, 1993 -  at [employer]; Calvin 1996 -   in [place]; Marcelle, 1998 - Graduated.   Works at [employer].        Enjoys [employer] during the summer.     Social Determinants of Health     Financial Resource Strain: Not on file   Food Insecurity: Not on file   Transportation Needs: Not on file   Physical Activity: Not on file   Stress: Not on file   Social Connections: Not on file   Interpersonal Safety: Low Risk  (6/4/2024)    Interpersonal Safety     Do you feel physically and emotionally safe where you currently live?: Yes     Within the past 12 months, have you been hit, slapped, kicked or otherwise physically hurt by someone?: No     Within the past 12 months, have you been humiliated or emotionally abused in other ways by your partner or ex-partner?: No   Housing Stability: Not on file       PROBLEM LIST:  Patient Active Problem List   Diagnosis    Carpal tunnel syndrome, bilateral    Hiatal hernia    Neck pain, chronic    Lightheadedness    S/P hysterectomy    Recurrent periodic urticaria    Arthritis of finger of both hands    Gastroesophageal reflux disease with esophagitis without hemorrhage    Dyshidrotic eczema    History of 2019 novel coronavirus disease (COVID-19)    Morbid obesity (H)    Snoring    Essential hypertension    IFG (impaired fasting glucose)    Hip pain, left    Chronic cholecystitis with calculus       Review of Systems:  COMPLETE REVIEW OF SYSTEMS: Decreased appetite  Gastrointestinal: See HPI  Cardiovascular: Ankle swelling  Respiratory: Denies problems  Genitourinary: Denies problems  Musculoskeletal: Joint pain  Skin: Rash and itching  Neurologic: Headaches  Psychiatric: Denies problems  Endocrine: Denies problems  Heme/Lymphatic: Denies problems  Vascular: Leg pain at night    Physical exam: /76 (BP Location: Right arm, Patient Position: Sitting, Cuff Size:  "Adult Regular)   Pulse 77   Temp 98.4  F (36.9  C) (Temporal)   Resp 18   Ht 1.638 m (5' 4.5\")   Wt 81.6 kg (179 lb 12.8 oz)   LMP 06/02/2018   SpO2 99%   BMI 30.39 kg/m      General: this is a pleasant female patient in no acute distress.  Patient is awake alert and oriented x3 .   EXAM:  Chest/Respiratory Exam: Normal - Clear to auscultation without rales, rhonchi, or wheezing.  Cardiovascular Exam: regular rate and rhythm  Abdomen: Laparoscopic scars.  Soft.  Mild discomfort to palpation right upper quadrant, but no peritoneal signs.    Assessment:   Chronic cholecystitis/cholelithiasis    Plan:    Laparoscopic cholecystectomy as a day surgery.  Risks of bleeding, infection, potential injury to bowel or bile duct, possible open procedure, possible bile leak discussed and wishes to proceed.  She will finish her Cipro and stay on a low-fat diet until surgery.      Kosta Burnett MD        "

## 2024-06-04 NOTE — H&P (VIEW-ONLY)
Surgical Clinic Consult  Referring physician:  SATYA Samuels  Primary physician:     Hali Foster    Chief complaint:   Chronic cholecystitis/cholelithiasis    History of present illness:  This is a 49 year old female I am seeing in consultation for chronic cholecystitis/cholelithiasis.  The patient has had 4 episodes of right upper quadrant abdominal pain.  The first was in the chest and radiated to the back such that she thought she was having a heart attack.  The last attacks have been lower in the right upper quadrant.  These were after eating fatty foods.  Ultrasound confirms gallstones no wall thickening.  After her last attack on 5/31/2024 she noticed some orange urine.  Bilirubin was normal yesterday.  She feels better today after receiving antibiotics in the emergency room and taking it this morning.  CBC and lipase were normal.     Past medical history:   Past Medical History:   Diagnosis Date    Personal history of other medical treatment (CODE)     Childbirth       Pastsurgical history:  Past Surgical History:   Procedure Laterality Date    COLONOSCOPY N/A 10/12/2022    hyperplastic - f/u 10 years    DILATION AND CURETTAGE      1992,after SAB    HYSTERECTOMY VAGINAL N/A 08/08/2018    Procedure: HYSTERECTOMY VAGINAL;  Total Vaginal Hysterectomy and Bilateral Salpingectomy, Left oophorectomy;  Surgeon: Demarco Verdugo MD;  Location:  OR    LAPAROSCOPIC NISSEN FUNDOPLICATION N/A 04/21/2021    Procedure: FUNDOPLICATION, NISSEN, LAPAROSCOPIC;  Surgeon: Damien Ferguson MD;  Location:  OR       Current medications:  Current Outpatient Medications   Medication Sig Dispense Refill    amLODIPine (NORVASC) 5 MG tablet Take 1 tablet (5 mg) by mouth daily 90 tablet 3    ciprofloxacin (CIPRO) 500 MG tablet Take 1 tablet (500 mg) by mouth 2 times daily for 5 days 10 tablet 0    estradiol (ESTRACE) 0.5 MG tablet Take 1 tablet (0.5 mg) by mouth daily 90 tablet 3    lisinopril (ZESTRIL) 40 MG tablet  Take 1 tablet (40 mg) by mouth daily 90 tablet 3    multivitamin w/minerals (THERA-VIT-M) tablet Take 1 tablet by mouth daily      other medical supplies Shingrix.  Diagnosis:  Z23. 1 each 1    SUMAtriptan (IMITREX) 50 MG tablet Take 1 tablet (50 mg) by mouth at onset of headache for migraine May repeat in 2 hours. Max 4 tablets/24 hours. 10 tablet 11       Allergies:  No Known Allergies    Family history:  Family History   Problem Relation Age of Onset    Other - See Comments Mother 65        Celiac disease    Other - See Comments Father         Glaucoma    Family History Negative Sister         Good Health    Family History Negative Sister         Good Health    Family History Negative Brother         Good Health    Family History Negative Brother         Good Health,Twin    Family History Negative Brother         Good Health,Twin - Had GIB    Heart Disease No family hx of         Heart Disease,No known premature CAD    Diabetes No family hx of         Diabetes,DM2    Breast Cancer No family hx of         Cancer-breast    Colon Cancer No family hx of         Cancer-colon       Social history:  Social History     Socioeconomic History    Marital status:      Spouse name: Jitendra    Number of children: Not on file    Years of education: Not on file    Highest education level: Not on file   Occupational History    Not on file   Tobacco Use    Smoking status: Former     Current packs/day: 0.00     Types: Cigarettes     Quit date: 2022     Years since quittin.0     Passive exposure: Never    Smokeless tobacco: Never   Vaping Use    Vaping status: Never Used   Substance and Sexual Activity    Alcohol use: No     Alcohol/week: 1.7 standard drinks of alcohol    Drug use: No    Sexual activity: Yes     Partners: Male     Birth control/protection: Female Surgical     Comment: Hysterectomy    Other Topics Concern    Parent/sibling w/ CABG, MI or angioplasty before 65F 55M? Not Asked   Social History  Narrative    Lives with  Jitendra.  Has three grown children, Poonam, 1993 -  at [employer]; Calvin 1996 -   in [place]; Marcelle, 1998 - Graduated.   Works at [employer].        Enjoys [employer] during the summer.     Social Determinants of Health     Financial Resource Strain: Not on file   Food Insecurity: Not on file   Transportation Needs: Not on file   Physical Activity: Not on file   Stress: Not on file   Social Connections: Not on file   Interpersonal Safety: Low Risk  (6/4/2024)    Interpersonal Safety     Do you feel physically and emotionally safe where you currently live?: Yes     Within the past 12 months, have you been hit, slapped, kicked or otherwise physically hurt by someone?: No     Within the past 12 months, have you been humiliated or emotionally abused in other ways by your partner or ex-partner?: No   Housing Stability: Not on file       PROBLEM LIST:  Patient Active Problem List   Diagnosis    Carpal tunnel syndrome, bilateral    Hiatal hernia    Neck pain, chronic    Lightheadedness    S/P hysterectomy    Recurrent periodic urticaria    Arthritis of finger of both hands    Gastroesophageal reflux disease with esophagitis without hemorrhage    Dyshidrotic eczema    History of 2019 novel coronavirus disease (COVID-19)    Morbid obesity (H)    Snoring    Essential hypertension    IFG (impaired fasting glucose)    Hip pain, left    Chronic cholecystitis with calculus       Review of Systems:  COMPLETE REVIEW OF SYSTEMS: Decreased appetite  Gastrointestinal: See HPI  Cardiovascular: Ankle swelling  Respiratory: Denies problems  Genitourinary: Denies problems  Musculoskeletal: Joint pain  Skin: Rash and itching  Neurologic: Headaches  Psychiatric: Denies problems  Endocrine: Denies problems  Heme/Lymphatic: Denies problems  Vascular: Leg pain at night    Physical exam: /76 (BP Location: Right arm, Patient Position: Sitting, Cuff Size:  "Adult Regular)   Pulse 77   Temp 98.4  F (36.9  C) (Temporal)   Resp 18   Ht 1.638 m (5' 4.5\")   Wt 81.6 kg (179 lb 12.8 oz)   LMP 06/02/2018   SpO2 99%   BMI 30.39 kg/m      General: this is a pleasant female patient in no acute distress.  Patient is awake alert and oriented x3 .   EXAM:  Chest/Respiratory Exam: Normal - Clear to auscultation without rales, rhonchi, or wheezing.  Cardiovascular Exam: regular rate and rhythm  Abdomen: Laparoscopic scars.  Soft.  Mild discomfort to palpation right upper quadrant, but no peritoneal signs.    Assessment:   Chronic cholecystitis/cholelithiasis    Plan:    Laparoscopic cholecystectomy as a day surgery.  Risks of bleeding, infection, potential injury to bowel or bile duct, possible open procedure, possible bile leak discussed and wishes to proceed.  She will finish her Cipro and stay on a low-fat diet until surgery.      Kosta Burnett MD        "

## 2024-06-04 NOTE — NURSING NOTE
"Chief Complaint   Patient presents with    Consult     Discuss gallbladder surgery       Medication reconciliation completed.    FOOD SECURITY SCREENING QUESTIONS:    The next two questions are to help us understand your food security.  If you are feeling you need any assistance in this area, we have resources available to support you today.    Hunger Vital Signs:  Within the past 12 months we worried whether our food would run out before we got money to buy more. Never  Within the past 12 months the food we bought just didn't last and we didn't have money to get more. Never    Initial /76 (BP Location: Right arm, Patient Position: Sitting, Cuff Size: Adult Regular)   Pulse 77   Temp 98.4  F (36.9  C) (Temporal)   Resp 18   Ht 1.638 m (5' 4.5\")   Wt 81.6 kg (179 lb 12.8 oz)   LMP 06/02/2018   SpO2 99%   BMI 30.39 kg/m   Estimated body mass index is 30.39 kg/m  as calculated from the following:    Height as of this encounter: 1.638 m (5' 4.5\").    Weight as of this encounter: 81.6 kg (179 lb 12.8 oz).       Sadaf Lujan LPN .......  6/4/2024  11:05 AM    "

## 2024-06-10 ENCOUNTER — ANESTHESIA EVENT (OUTPATIENT)
Dept: SURGERY | Facility: OTHER | Age: 49
End: 2024-06-10
Payer: COMMERCIAL

## 2024-06-11 ENCOUNTER — ANESTHESIA (OUTPATIENT)
Dept: SURGERY | Facility: OTHER | Age: 49
End: 2024-06-11
Payer: COMMERCIAL

## 2024-06-11 ENCOUNTER — HOSPITAL ENCOUNTER (OUTPATIENT)
Facility: OTHER | Age: 49
Discharge: HOME | End: 2024-06-11
Attending: SURGERY | Admitting: SURGERY
Payer: COMMERCIAL

## 2024-06-11 VITALS
WEIGHT: 179 LBS | BODY MASS INDEX: 30.56 KG/M2 | RESPIRATION RATE: 12 BRPM | TEMPERATURE: 97.2 F | DIASTOLIC BLOOD PRESSURE: 93 MMHG | HEIGHT: 64 IN | HEART RATE: 70 BPM | OXYGEN SATURATION: 96 % | SYSTOLIC BLOOD PRESSURE: 113 MMHG

## 2024-06-11 DIAGNOSIS — K80.10 CHRONIC CHOLECYSTITIS WITH CALCULUS: Primary | ICD-10-CM

## 2024-06-11 PROCEDURE — 250N000013 HC RX MED GY IP 250 OP 250 PS 637: Performed by: SURGERY

## 2024-06-11 PROCEDURE — 47562 LAPAROSCOPIC CHOLECYSTECTOMY: CPT | Performed by: SURGERY

## 2024-06-11 PROCEDURE — 250N000011 HC RX IP 250 OP 636: Performed by: SURGERY

## 2024-06-11 PROCEDURE — 258N000003 HC RX IP 258 OP 636: Mod: JZ

## 2024-06-11 PROCEDURE — 88304 TISSUE EXAM BY PATHOLOGIST: CPT

## 2024-06-11 PROCEDURE — 250N000025 HC SEVOFLURANE, PER MIN: Performed by: SURGERY

## 2024-06-11 PROCEDURE — 47562 LAPAROSCOPIC CHOLECYSTECTOMY: CPT

## 2024-06-11 PROCEDURE — 999N000141 HC STATISTIC PRE-PROCEDURE NURSING ASSESSMENT: Performed by: SURGERY

## 2024-06-11 PROCEDURE — 710N000010 HC RECOVERY PHASE 1, LEVEL 2, PER MIN: Performed by: SURGERY

## 2024-06-11 PROCEDURE — 272N000001 HC OR GENERAL SUPPLY STERILE: Performed by: SURGERY

## 2024-06-11 PROCEDURE — 250N000009 HC RX 250

## 2024-06-11 PROCEDURE — 710N000012 HC RECOVERY PHASE 2, PER MINUTE: Performed by: SURGERY

## 2024-06-11 PROCEDURE — 250N000011 HC RX IP 250 OP 636: Mod: JZ

## 2024-06-11 PROCEDURE — 258N000001 HC RX 258: Performed by: SURGERY

## 2024-06-11 PROCEDURE — 370N000017 HC ANESTHESIA TECHNICAL FEE, PER MIN: Performed by: SURGERY

## 2024-06-11 PROCEDURE — 360N000076 HC SURGERY LEVEL 3, PER MIN: Performed by: SURGERY

## 2024-06-11 RX ORDER — CEFAZOLIN SODIUM/WATER 2 G/20 ML
2 SYRINGE (ML) INTRAVENOUS
Status: COMPLETED | OUTPATIENT
Start: 2024-06-11 | End: 2024-06-11

## 2024-06-11 RX ORDER — DEXAMETHASONE SODIUM PHOSPHATE 4 MG/ML
INJECTION, SOLUTION INTRA-ARTICULAR; INTRALESIONAL; INTRAMUSCULAR; INTRAVENOUS; SOFT TISSUE PRN
Status: DISCONTINUED | OUTPATIENT
Start: 2024-06-11 | End: 2024-06-11

## 2024-06-11 RX ORDER — OXYCODONE HYDROCHLORIDE 5 MG/1
5 TABLET ORAL
Status: DISCONTINUED | OUTPATIENT
Start: 2024-06-11 | End: 2024-06-11 | Stop reason: HOSPADM

## 2024-06-11 RX ORDER — SODIUM CHLORIDE, SODIUM LACTATE, POTASSIUM CHLORIDE, CALCIUM CHLORIDE 600; 310; 30; 20 MG/100ML; MG/100ML; MG/100ML; MG/100ML
INJECTION, SOLUTION INTRAVENOUS CONTINUOUS
Status: DISCONTINUED | OUTPATIENT
Start: 2024-06-11 | End: 2024-06-11 | Stop reason: HOSPADM

## 2024-06-11 RX ORDER — HYDROMORPHONE HCL IN WATER/PF 6 MG/30 ML
0.4 PATIENT CONTROLLED ANALGESIA SYRINGE INTRAVENOUS EVERY 5 MIN PRN
Status: DISCONTINUED | OUTPATIENT
Start: 2024-06-11 | End: 2024-06-11 | Stop reason: HOSPADM

## 2024-06-11 RX ORDER — NALOXONE HYDROCHLORIDE 0.4 MG/ML
0.1 INJECTION, SOLUTION INTRAMUSCULAR; INTRAVENOUS; SUBCUTANEOUS
Status: DISCONTINUED | OUTPATIENT
Start: 2024-06-11 | End: 2024-06-11 | Stop reason: HOSPADM

## 2024-06-11 RX ORDER — FENTANYL CITRATE 50 UG/ML
25 INJECTION, SOLUTION INTRAMUSCULAR; INTRAVENOUS EVERY 5 MIN PRN
Status: DISCONTINUED | OUTPATIENT
Start: 2024-06-11 | End: 2024-06-11 | Stop reason: HOSPADM

## 2024-06-11 RX ORDER — FENTANYL CITRATE 50 UG/ML
50 INJECTION, SOLUTION INTRAMUSCULAR; INTRAVENOUS EVERY 5 MIN PRN
Status: DISCONTINUED | OUTPATIENT
Start: 2024-06-11 | End: 2024-06-11 | Stop reason: HOSPADM

## 2024-06-11 RX ORDER — HYDROMORPHONE HCL IN WATER/PF 6 MG/30 ML
0.2 PATIENT CONTROLLED ANALGESIA SYRINGE INTRAVENOUS EVERY 5 MIN PRN
Status: DISCONTINUED | OUTPATIENT
Start: 2024-06-11 | End: 2024-06-11 | Stop reason: HOSPADM

## 2024-06-11 RX ORDER — ONDANSETRON 2 MG/ML
4 INJECTION INTRAMUSCULAR; INTRAVENOUS EVERY 30 MIN PRN
Status: DISCONTINUED | OUTPATIENT
Start: 2024-06-11 | End: 2024-06-11 | Stop reason: HOSPADM

## 2024-06-11 RX ORDER — CEFAZOLIN SODIUM/WATER 2 G/20 ML
2 SYRINGE (ML) INTRAVENOUS SEE ADMIN INSTRUCTIONS
Status: DISCONTINUED | OUTPATIENT
Start: 2024-06-11 | End: 2024-06-11 | Stop reason: HOSPADM

## 2024-06-11 RX ORDER — OXYCODONE HYDROCHLORIDE 5 MG/1
10 TABLET ORAL
Status: DISCONTINUED | OUTPATIENT
Start: 2024-06-11 | End: 2024-06-11 | Stop reason: HOSPADM

## 2024-06-11 RX ORDER — DEXAMETHASONE SODIUM PHOSPHATE 10 MG/ML
4 INJECTION, SOLUTION INTRAMUSCULAR; INTRAVENOUS
Status: DISCONTINUED | OUTPATIENT
Start: 2024-06-11 | End: 2024-06-11 | Stop reason: HOSPADM

## 2024-06-11 RX ORDER — OXYCODONE AND ACETAMINOPHEN 5; 325 MG/1; MG/1
1 TABLET ORAL EVERY 6 HOURS PRN
Qty: 12 TABLET | Refills: 0 | Status: SHIPPED | OUTPATIENT
Start: 2024-06-11 | End: 2024-06-14

## 2024-06-11 RX ORDER — ACETAMINOPHEN 325 MG/1
650 TABLET ORAL 4 TIMES DAILY
Qty: 24 TABLET | Refills: 0 | Status: SHIPPED | OUTPATIENT
Start: 2024-06-11 | End: 2024-06-14

## 2024-06-11 RX ORDER — BUPIVACAINE HYDROCHLORIDE 2.5 MG/ML
INJECTION, SOLUTION INFILTRATION; PERINEURAL PRN
Status: DISCONTINUED | OUTPATIENT
Start: 2024-06-11 | End: 2024-06-11 | Stop reason: HOSPADM

## 2024-06-11 RX ORDER — PROPOFOL 10 MG/ML
INJECTION, EMULSION INTRAVENOUS PRN
Status: DISCONTINUED | OUTPATIENT
Start: 2024-06-11 | End: 2024-06-11

## 2024-06-11 RX ORDER — ONDANSETRON 4 MG/1
4 TABLET, ORALLY DISINTEGRATING ORAL EVERY 30 MIN PRN
Status: DISCONTINUED | OUTPATIENT
Start: 2024-06-11 | End: 2024-06-11 | Stop reason: HOSPADM

## 2024-06-11 RX ORDER — KETOROLAC TROMETHAMINE 30 MG/ML
INJECTION, SOLUTION INTRAMUSCULAR; INTRAVENOUS PRN
Status: DISCONTINUED | OUTPATIENT
Start: 2024-06-11 | End: 2024-06-11

## 2024-06-11 RX ORDER — IBUPROFEN 600 MG/1
600 TABLET, FILM COATED ORAL 4 TIMES DAILY
Qty: 12 TABLET | Refills: 0 | Status: SHIPPED | OUTPATIENT
Start: 2024-06-11 | End: 2024-06-14

## 2024-06-11 RX ORDER — FENTANYL CITRATE 50 UG/ML
INJECTION, SOLUTION INTRAMUSCULAR; INTRAVENOUS PRN
Status: DISCONTINUED | OUTPATIENT
Start: 2024-06-11 | End: 2024-06-11

## 2024-06-11 RX ORDER — LIDOCAINE 40 MG/G
CREAM TOPICAL
Status: DISCONTINUED | OUTPATIENT
Start: 2024-06-11 | End: 2024-06-11 | Stop reason: HOSPADM

## 2024-06-11 RX ORDER — ONDANSETRON 2 MG/ML
INJECTION INTRAMUSCULAR; INTRAVENOUS PRN
Status: DISCONTINUED | OUTPATIENT
Start: 2024-06-11 | End: 2024-06-11

## 2024-06-11 RX ORDER — LIDOCAINE HYDROCHLORIDE 20 MG/ML
INJECTION, SOLUTION INFILTRATION; PERINEURAL PRN
Status: DISCONTINUED | OUTPATIENT
Start: 2024-06-11 | End: 2024-06-11

## 2024-06-11 RX ORDER — ACETAMINOPHEN 325 MG/1
975 TABLET ORAL ONCE
Status: COMPLETED | OUTPATIENT
Start: 2024-06-11 | End: 2024-06-11

## 2024-06-11 RX ADMIN — PROPOFOL 200 MG: 10 INJECTION, EMULSION INTRAVENOUS at 13:12

## 2024-06-11 RX ADMIN — FENTANYL CITRATE 25 MCG: 50 INJECTION INTRAMUSCULAR; INTRAVENOUS at 13:33

## 2024-06-11 RX ADMIN — ROCURONIUM BROMIDE 20 MG: 50 INJECTION, SOLUTION INTRAVENOUS at 13:53

## 2024-06-11 RX ADMIN — PHENYLEPHRINE HYDROCHLORIDE 100 MCG: 10 INJECTION INTRAVENOUS at 13:21

## 2024-06-11 RX ADMIN — ROCURONIUM BROMIDE 5 MG: 50 INJECTION, SOLUTION INTRAVENOUS at 13:12

## 2024-06-11 RX ADMIN — SODIUM CHLORIDE, POTASSIUM CHLORIDE, SODIUM LACTATE AND CALCIUM CHLORIDE 100 ML/HR: 600; 310; 30; 20 INJECTION, SOLUTION INTRAVENOUS at 11:37

## 2024-06-11 RX ADMIN — SUGAMMADEX 200 MG: 100 INJECTION, SOLUTION INTRAVENOUS at 14:23

## 2024-06-11 RX ADMIN — FENTANYL CITRATE 25 MCG: 50 INJECTION INTRAMUSCULAR; INTRAVENOUS at 14:26

## 2024-06-11 RX ADMIN — PHENYLEPHRINE HYDROCHLORIDE 150 MCG: 10 INJECTION INTRAVENOUS at 13:28

## 2024-06-11 RX ADMIN — KETOROLAC TROMETHAMINE 15 MG: 30 INJECTION, SOLUTION INTRAMUSCULAR at 14:16

## 2024-06-11 RX ADMIN — ONDANSETRON HYDROCHLORIDE 4 MG: 2 SOLUTION INTRAMUSCULAR; INTRAVENOUS at 13:12

## 2024-06-11 RX ADMIN — ACETAMINOPHEN 975 MG: 325 TABLET, FILM COATED ORAL at 11:24

## 2024-06-11 RX ADMIN — Medication 2 G: at 13:04

## 2024-06-11 RX ADMIN — LIDOCAINE HYDROCHLORIDE 60 MG: 20 INJECTION, SOLUTION INFILTRATION; PERINEURAL at 13:12

## 2024-06-11 RX ADMIN — ROCURONIUM BROMIDE 45 MG: 50 INJECTION, SOLUTION INTRAVENOUS at 13:13

## 2024-06-11 RX ADMIN — MIDAZOLAM HYDROCHLORIDE 2 MG: 1 INJECTION, SOLUTION INTRAMUSCULAR; INTRAVENOUS at 13:07

## 2024-06-11 RX ADMIN — DEXAMETHASONE SODIUM PHOSPHATE 4 MG: 4 INJECTION, SOLUTION INTRA-ARTICULAR; INTRALESIONAL; INTRAMUSCULAR; INTRAVENOUS; SOFT TISSUE at 13:12

## 2024-06-11 RX ADMIN — FENTANYL CITRATE 25 MCG: 50 INJECTION INTRAMUSCULAR; INTRAVENOUS at 13:12

## 2024-06-11 RX ADMIN — FENTANYL CITRATE 25 MCG: 50 INJECTION INTRAMUSCULAR; INTRAVENOUS at 13:10

## 2024-06-11 ASSESSMENT — ACTIVITIES OF DAILY LIVING (ADL)
ADLS_ACUITY_SCORE: 37

## 2024-06-11 NOTE — ANESTHESIA POSTPROCEDURE EVALUATION
Patient: Yanna Ordonez    Procedure: Procedure(s):  CHOLECYSTECTOMY, LAPAROSCOPIC       Anesthesia Type:  General    Note:  Disposition: Outpatient   Postop Pain Control: Uneventful            Sign Out: Well controlled pain   PONV: No   Neuro/Psych: Uneventful            Sign Out: Acceptable/Baseline neuro status   Airway/Respiratory: Uneventful            Sign Out: Acceptable/Baseline resp. status   CV/Hemodynamics: Uneventful            Sign Out: Acceptable CV status; No obvious hypovolemia; No obvious fluid overload   Other NRE: NONE   DID A NON-ROUTINE EVENT OCCUR? No       Last vitals:  Vitals Value Taken Time   /86 06/11/24 1455   Temp 97.16  F (36.2  C) 06/11/24 1458   Pulse 62 06/11/24 1456   Resp 19 06/11/24 1456   SpO2 98 % 06/11/24 1456   Vitals shown include unfiled device data.    Electronically Signed By: ANGEL Borja CRNA  June 11, 2024  3:01 PM

## 2024-06-11 NOTE — INTERVAL H&P NOTE
The history and physical has been reviewed and the patient has been examined.  There are no interim changes to the patient's history or physical condition.    Kosta Burnett MD

## 2024-06-11 NOTE — OP NOTE
PREOPERATIVE  DIAGNOSIS:  Chronic cholecystitis/cholelithiasis.       POSTOPERATIVE DIAGNOSIS:  Chronic cholecystitis/cholelithiasis.      PROCEDURE PERFORMED:  Laparoscopic cholecystectomy.    SURGEON:  Kosta Burnett MD FACS    ASSISTANT: VARGHESE Reardon RN    ANESTHESIA:  General, JAY JAY Edwards CRNA    FAMILY PHYSICIAN: Hali Foster      INDICATION FOR THE PROCEDURE:  This is a 49 year old female I am seeing in consultation for chronic cholecystitis/cholelithiasis.  The patient has had 4 episodes of right upper quadrant abdominal pain.  The first was in the chest and radiated to the back such that she thought she was having a heart attack.  The last attacks have been lower in the right upper quadrant.  These were after eating fatty foods.  Ultrasound confirms gallstones no wall thickening.  After her last attack on 5/31/2024 she noticed some orange urine.  Bilirubin was normal yesterday.  She feels better today after receiving antibiotics in the emergency room and taking it this morning.  CBC and lipase were normal.      PROCEDURE:  After adequate general anesthesia, the patient was prepped and draped in the usual sterile fashion.  A supraumbilical 5 mm incision was made and the abdomen entered using the Visiport technique.  The abdomen was insufflated with carbon dioxide to a pressure of 15 mmHg.  Under direct vision, an 12 mm epigastric and two 5 mm right-sided ports were placed.  The initial port site was inspected and was unremarkable.   The gallbladder was then grasped and held on traction.    Dissection was carried down to  Norma s pouch  and a thick adhesion was  cut with cautery.  The cystic duct was then able to be visualized.  That was dissected free circumferentially, triply clipped and divided after milking a stone back into gallbladder.  Adjacent to that was the cystic artery and that was dissected free circumferentially, doubly clipped and divided.  The gallbladder was then taken out  of the hepatic bed using the hook cautery and maintaining good hemostasis throughout.  There was no spillage of stones or bile throughout.The gallbladder was placed in an plastic pouch and removed through the epigastric port site with some dilation.  There was no spillage.  The right upper quadrant was irrigated with a liter of normal saline.  There was good hemostasis.   The three 5 mm ports were removed under direct vision.  There was good hemostasis.   The abdomen was deflated and the epigastric port removed.  The epigastric fascial defect was closed with an 0-Vicryl suture.  The wound was infiltrated with 0.25% Marcaine, the dermis approximated with 3-0 Vicryl sutures and the skin closed with 5-0 Maxon intracuticular suture.  Proxi-Strips and clean, dry dressings were applied.  The patient was taken to the recovery room in stable condition.      Kosta Burnett MD FACS

## 2024-06-11 NOTE — ANESTHESIA PROCEDURE NOTES
Airway         Procedure Start/Stop Times: 6/11/2024 1:14 PM  Staff -        CRNA: Yasmin Campos APRN CRNA       Performed By: CRNAIndications and Patient Condition       Indications for airway management: airway protection and tonie-procedural       Induction type:intravenous       Mask difficulty assessment: 1 - vent by mask    Final Airway Details       Final airway type: endotracheal airway       Successful airway: ETT - single  Endotracheal Airway Details        ETT size (mm): 7.0       Successful intubation technique: direct laryngoscopy       DL Blade Type: MAC 4       Grade View of Cords: 2       Adjucts: stylet       Position: Center       Measured from: lips       Secured at (cm): 22       Bite block used: None    Post intubation assessment        Placement verified by: capnometry, equal breath sounds and chest rise        Number of attempts at approach: 1       Number of other approaches attempted: 0       Secured with: tape       Ease of procedure: easy       Dentition: Intact    Medication(s) Administered   Medication Administration Time: 6/11/2024 1:14 PM

## 2024-06-11 NOTE — OR NURSING
Patient has been discharged to home at 1610 via wheelchair accompanied by her     Written discharge instructions were provided to patient.  Prescriptions were picked up from [facility] by patients .  Patient states their pain is tolerable, and denies any nausea or dizziness upon discharge.    Patient and adult caring for them verbalize understanding of discharge instructions including no driving until tomorrow and no longer taking narcotic pain medications - no operating mechanical equipment and no making any important decisions.They understand reason for discharge, and necessary follow-up appointments.       Magalis Villarreal RN

## 2024-06-11 NOTE — ANESTHESIA PREPROCEDURE EVALUATION
Anesthesia Pre-Procedure Evaluation    Patient: Yanna Ordonez   MRN: [id number] : 1975        Procedure : Procedure(s):  CHOLECYSTECTOMY, LAPAROSCOPIC          Past Medical History:   Diagnosis Date     Personal history of other medical treatment (CODE)     Childbirth      Past Surgical History:   Procedure Laterality Date     COLONOSCOPY N/A 10/12/2022    hyperplastic - f/u 10 years     DILATION AND CURETTAGE      ,after SAB     HYSTERECTOMY VAGINAL N/A 2018    Procedure: HYSTERECTOMY VAGINAL;  Total Vaginal Hysterectomy and Bilateral Salpingectomy, Left oophorectomy;  Surgeon: Demarco Verdugo MD;  Location:  OR     LAPAROSCOPIC NISSEN FUNDOPLICATION N/A 2021    Procedure: FUNDOPLICATION, NISSEN, LAPAROSCOPIC;  Surgeon: Damien Ferguson MD;  Location:  OR      No Known Allergies   Social History     Tobacco Use     Smoking status: Former     Current packs/day: 0.00     Types: Cigarettes     Quit date: 2022     Years since quittin.1     Passive exposure: Never     Smokeless tobacco: Never   Substance Use Topics     Alcohol use: No     Alcohol/week: 1.7 standard drinks of alcohol      Wt Readings from Last 1 Encounters:   24 81.6 kg (179 lb 12.8 oz)        Anesthesia Evaluation   Pt has had prior anesthetic.         ROS/MED HX  ENT/Pulmonary:       Neurologic:       Cardiovascular:     (+)  hypertension- -   -  - -                                      METS/Exercise Tolerance: >4 METS    Hematologic:       Musculoskeletal:   (+)  arthritis,             GI/Hepatic: Comment: S/P lap nissen in     (+) GERD, Asymptomatic on medication,    hiatal hernia,    cholecystitis/cholelithiasis,          Renal/Genitourinary:       Endo:     (+)               Obesity,       Psychiatric/Substance Use:       Infectious Disease:       Malignancy:       Other:          Physical Exam    Airway  airway exam normal      Mallampati: II   TM distance: > 3 FB   Neck ROM: full   Mouth  "opening: > 3 cm    Respiratory Devices and Support         Dental       (+) Modest Abnormalities - crowns, retainers, 1 or 2 missing teeth      Cardiovascular   cardiovascular exam normal       Rhythm and rate: regular and normal     Pulmonary   pulmonary exam normal        breath sounds clear to auscultation       OUTSIDE LABS:  CBC:   Lab Results   Component Value Date    WBC 4.9 06/03/2024    WBC 5.7 07/20/2023    HGB 12.8 06/03/2024    HGB 13.6 07/20/2023    HCT 39.9 06/03/2024    HCT 41.3 07/20/2023     06/03/2024     07/20/2023     BMP:   Lab Results   Component Value Date     06/03/2024     07/20/2023    POTASSIUM 3.7 06/03/2024    POTASSIUM 4.1 07/20/2023    CHLORIDE 101 06/03/2024    CHLORIDE 102 07/20/2023    CO2 25 06/03/2024    CO2 25 07/20/2023    BUN 6.3 06/03/2024    BUN 9.3 07/20/2023    CR 0.65 06/03/2024    CR 0.65 07/20/2023    GLC 91 06/03/2024    GLC 95 07/20/2023     COAGS: No results found for: \"PTT\", \"INR\", \"FIBR\"  POC:   Lab Results   Component Value Date    HCG Negative 08/08/2018     HEPATIC:   Lab Results   Component Value Date    ALBUMIN 4.7 06/03/2024    PROTTOTAL 8.1 06/03/2024     (H) 06/03/2024     (H) 06/03/2024    ALKPHOS 209 (H) 06/03/2024    BILITOTAL 1.0 06/03/2024     OTHER:   Lab Results   Component Value Date    A1C 5.5 03/15/2021    DEBBIE 9.5 06/03/2024    MAG 2.3 06/03/2024    LIPASE 34 06/03/2024    TSH 1.92 07/20/2023    CRP 4.3 06/28/2022    SED 13 06/28/2022       Anesthesia Plan    ASA Status:  2    NPO Status:  NPO Appropriate    Anesthesia Type: General.     - Airway: ETT   Induction: Intravenous.   Maintenance: Balanced.        Consents    Anesthesia Plan(s) and associated risks, benefits, and realistic alternatives discussed. Questions answered and patient/representative(s) expressed understanding.     - Discussed: Risks, Benefits and Alternatives for BOTH SEDATION and the PROCEDURE were discussed     - Discussed with:  " "Patient, Spouse      - Extended Intubation/Ventilatory Support Discussed: No.      - Patient is DNR/DNI Status: No     Use of blood products discussed: No .     Postoperative Care    Pain management: IV analgesics, Multi-modal analgesia.   PONV prophylaxis: Ondansetron (or other 5HT-3), Dexamethasone or Solumedrol     Comments:    Other Comments: Risks, benefits and alternatives discussed and would like to proceed.              ANGEL Borja CRNA    I have reviewed the pertinent notes and labs in the chart from the past 30 days and (re)examined the patient.  Any updates or changes from those notes are reflected in this note.              # Obesity: Estimated body mass index is 30.39 kg/m  as calculated from the following:    Height as of 6/4/24: 1.638 m (5' 4.5\").    Weight as of 6/4/24: 81.6 kg (179 lb 12.8 oz).      "

## 2024-06-11 NOTE — ANESTHESIA CARE TRANSFER NOTE
Patient: Yanna Ordonez    Procedure: Procedure(s):  CHOLECYSTECTOMY, LAPAROSCOPIC       Diagnosis: Chronic cholecystitis with calculus [K80.10]  Diagnosis Additional Information: No value filed.    Anesthesia Type:   General     Note:    Oropharynx: oropharynx clear of all foreign objects and spontaneously breathing  Level of Consciousness: awake and drowsy  Oxygen Supplementation: face mask  Level of Supplemental Oxygen (L/min / FiO2): 8  Independent Airway: airway patency satisfactory and stable  Dentition: dentition unchanged  Vital Signs Stable: post-procedure vital signs reviewed and stable  Report to RN Given: handoff report given  Patient transferred to: PACU    Handoff Report: Identifed the Patient, Identified the Reponsible Provider, Reviewed the pertinent medical history, Discussed the surgical course, Reviewed Intra-OP anesthesia mangement and issues during anesthesia, Set expectations for post-procedure period and Allowed opportunity for questions and acknowledgement of understanding      Vitals:  Vitals Value Taken Time   /72 06/11/24 1432   Temp 97.16  F (36.2  C) 06/11/24 1433   Pulse 65 06/11/24 1433   Resp 9 06/11/24 1433   SpO2 100 % 06/11/24 1433   Vitals shown include unfiled device data.    Electronically Signed By: Jose Dye  June 11, 2024  2:34 PM

## 2024-06-11 NOTE — OR NURSING
PACU Transfer Note    Yanna Ordonez was transferred to 1 via cart.  Equipment used for transport:  npone.  Accompanied by:  RN  Prescriptions were: Erx    PACU Respiratory Event Documentation     1) Episodes of Apnea greater than or equal to 10 seconds: no    2) Bradypnea - less than 8 breaths per minute: no    3) Pain score on 0 to 10 scale: 2    4) Pain-sedation mismatch (yes or no): no    5) Repeated 02 desaturation less than 90% (yes or no): no    Anesthesia notified? (yes or no): no    Report to [facility]    Any of the above events occuring repeatedly in separate 30 minute intervals may be considered recurrent PACU respiratory events.    Patient stable and meets phase 1 discharge criteria for transport from PACU.

## 2024-06-19 ENCOUNTER — OFFICE VISIT (OUTPATIENT)
Dept: SURGERY | Facility: OTHER | Age: 49
End: 2024-06-19
Attending: SURGERY
Payer: COMMERCIAL

## 2024-06-19 VITALS
HEART RATE: 82 BPM | SYSTOLIC BLOOD PRESSURE: 128 MMHG | WEIGHT: 179.8 LBS | OXYGEN SATURATION: 99 % | TEMPERATURE: 98.6 F | BODY MASS INDEX: 30.86 KG/M2 | RESPIRATION RATE: 18 BRPM | DIASTOLIC BLOOD PRESSURE: 72 MMHG

## 2024-06-19 DIAGNOSIS — Z98.890 POSTOPERATIVE STATE: Primary | ICD-10-CM

## 2024-06-19 PROBLEM — E66.9 OBESITY (BMI 30-39.9): Status: ACTIVE | Noted: 2021-02-26

## 2024-06-19 PROBLEM — K80.10 CHRONIC CHOLECYSTITIS WITH CALCULUS: Status: RESOLVED | Noted: 2024-06-04 | Resolved: 2024-06-19

## 2024-06-19 PROCEDURE — 99024 POSTOP FOLLOW-UP VISIT: CPT | Performed by: SURGERY

## 2024-06-19 ASSESSMENT — PAIN SCALES - GENERAL: PAINLEVEL: NO PAIN (0)

## 2024-06-19 NOTE — PROGRESS NOTES
Subjective:  This is a follow up after laparoscopic cholecystectomy on 6/11/24. The patient has no complaints. Feels much better.    Objective:/72 (BP Location: Right arm, Patient Position: Sitting, Cuff Size: Adult Regular)   Pulse 82   Temp 98.6  F (37  C) (Temporal)   Resp 18   Wt 81.6 kg (179 lb 12.8 oz)   LMP 06/02/2018   SpO2 99%   BMI 30.86 kg/m    The incisions are healing well without erythema. Soft and non-tender.    Assessment:   Doing well after laparoscopic cholecystectomy    Plan:  Follow-up as needed    
28-Oct-2022 12:05

## 2024-06-19 NOTE — NURSING NOTE
"Chief Complaint   Patient presents with    Post-Op - General Surgery     Follow up   charito white  6-11-24           Medication reconciliation completed.    FOOD SECURITY SCREENING QUESTIONS:    The next two questions are to help us understand your food security.  If you are feeling you need any assistance in this area, we have resources available to support you today.    Hunger Vital Signs:  Within the past 12 months we worried whether our food would run out before we got money to buy more. Never  Within the past 12 months the food we bought just didn't last and we didn't have money to get more. Never    Initial /72 (BP Location: Right arm, Patient Position: Sitting, Cuff Size: Adult Regular)   Pulse 82   Temp 98.6  F (37  C) (Temporal)   Resp 18   Wt 81.6 kg (179 lb 12.8 oz)   LMP 06/02/2018   SpO2 99%   BMI 30.86 kg/m   Estimated body mass index is 30.86 kg/m  as calculated from the following:    Height as of 6/11/24: 1.626 m (5' 4\").    Weight as of this encounter: 81.6 kg (179 lb 12.8 oz).       Sadaf Lujan LPN .......  6/19/2024  2:24 PM    "

## 2024-07-15 ENCOUNTER — HOSPITAL ENCOUNTER (OUTPATIENT)
Dept: GENERAL RADIOLOGY | Facility: OTHER | Age: 49
Discharge: HOME | End: 2024-07-15
Payer: COMMERCIAL

## 2024-07-15 ENCOUNTER — OFFICE VISIT (OUTPATIENT)
Dept: FAMILY MEDICINE | Facility: OTHER | Age: 49
End: 2024-07-15
Payer: COMMERCIAL

## 2024-07-15 VITALS
DIASTOLIC BLOOD PRESSURE: 78 MMHG | WEIGHT: 186 LBS | HEIGHT: 65 IN | RESPIRATION RATE: 16 BRPM | BODY MASS INDEX: 30.99 KG/M2 | OXYGEN SATURATION: 98 % | TEMPERATURE: 99.2 F | HEART RATE: 80 BPM | SYSTOLIC BLOOD PRESSURE: 130 MMHG

## 2024-07-15 DIAGNOSIS — S93.402A SPRAIN OF LEFT ANKLE, UNSPECIFIED LIGAMENT, INITIAL ENCOUNTER: ICD-10-CM

## 2024-07-15 DIAGNOSIS — S99.912A ANKLE INJURY, LEFT, INITIAL ENCOUNTER: Primary | ICD-10-CM

## 2024-07-15 PROCEDURE — 73630 X-RAY EXAM OF FOOT: CPT | Mod: LT

## 2024-07-15 PROCEDURE — 73610 X-RAY EXAM OF ANKLE: CPT | Mod: LT

## 2024-07-15 PROCEDURE — 99213 OFFICE O/P EST LOW 20 MIN: CPT | Mod: 24

## 2024-07-15 ASSESSMENT — PAIN SCALES - GENERAL: PAINLEVEL: MILD PAIN (2)

## 2024-07-15 NOTE — PATIENT INSTRUCTIONS
Your x-ray of the foot and ankle were negative for any fracture or dislocation.  This is likely a low-grade sprain.  Recommend conservative therapy at this point with rest, ice, elevation, and over-the-counter pain relievers such as ibuprofen.  Follow-up with sports medicine if symptoms persist or at any point they worsen.

## 2024-07-15 NOTE — PROGRESS NOTES
ASSESSMENT/PLAN:    (S93.402A) Sprain of left ankle, unspecified ligament, initial encounter; (S92.912A) Ankle injury, left, initial encounter  (primary encounter diagnosis)  Comment: Patient injured left ankle 1 week ago.  She reports that she heard a pop while walking.  She notes that the lateral ankle has been swollen and tender.  On exam there is tenderness and swelling about the lateral malleolus, no tenderness to Achilles tendon, Valecnia test negative, CMS is intact and pedal pulse 2+.  X-ray was obtained and was negative for any acute findings.  I suspect this is a soft tissue injury.  Likely low-grade sprain.  Recommend conservative therapy at this time.  Patient does feel she would benefit from walking boot, this was provided.  Plan: XR Ankle Left G/E 3 Views, XR Foot Left G/E 3         Views, Ankle/Foot Bracing Supplies Order         Walking Boot; Left; Pneumatic; Tall        Ankle/Foot Bracing Supplies Order Walking Boot;        Left; Pneumatic; Tall  Your x-ray of the foot and ankle were negative for any fracture or dislocation.  This is likely a low-grade sprain.  Recommend conservative therapy at this point with rest, ice, elevation, and over-the-counter pain relievers such as ibuprofen.  Follow-up with sports medicine if symptoms persist or at any point they worsen.    Discussed warning signs/symptoms indicative of need to f/u    Follow up if symptoms persist or worsen or concerns    I have reviewed the nursing notes.  I have reviewed the findings, diagnosis, plan and need for follow up with the patient.    I explained my diagnostic considerations and recommendations to the patient, who voiced understanding and agreement with the treatment plan. All questions were answered. We discussed potential side effects of any prescribed or recommended therapies, as well as expectations for response to treatments.    JOSE SCHRADER, ANGEL CNP  7/15/2024  10:03 AM    HPI:    Yanna MITTAL Mery is a 49 year old  female  who presents to [facility] today for concerns of left ankle injury.    Patient injured left ankle 1 week ago.  She reports that she heard a pop while walking.  She notes that the lateral ankle has been swollen and tender.    Patient was on Cipro over 1 month ago for 5-day course.  Denies any calf tenderness or Achilles tendon pain.    Past Medical History:   Diagnosis Date    Personal history of other medical treatment (CODE)     Childbirth     Past Surgical History:   Procedure Laterality Date    COLONOSCOPY N/A 10/12/2022    hyperplastic - f/u 10 years    DILATION AND CURETTAGE      ,after SAB    HYSTERECTOMY VAGINAL N/A 2018    Procedure: HYSTERECTOMY VAGINAL;  Total Vaginal Hysterectomy and Bilateral Salpingectomy, Left oophorectomy;  Surgeon: Demarco Verdugo MD;  Location:  OR    LAPAROSCOPIC CHOLECYSTECTOMY N/A 2024    Procedure: CHOLECYSTECTOMY, LAPAROSCOPIC;  Surgeon: Kosta Burnett MD;  Location:  OR    LAPAROSCOPIC NISSEN FUNDOPLICATION N/A 2021    Procedure: FUNDOPLICATION, NISSEN, LAPAROSCOPIC;  Surgeon: Damien Ferguson MD;  Location:  OR     Social History     Tobacco Use    Smoking status: Former     Current packs/day: 0.00     Types: Cigarettes     Quit date: 2022     Years since quittin.2     Passive exposure: Never    Smokeless tobacco: Never   Substance Use Topics    Alcohol use: No     Alcohol/week: 1.7 standard drinks of alcohol     Current Outpatient Medications   Medication Sig Dispense Refill    amLODIPine (NORVASC) 5 MG tablet Take 1 tablet (5 mg) by mouth daily 90 tablet 3    estradiol (ESTRACE) 0.5 MG tablet Take 1 tablet (0.5 mg) by mouth daily 90 tablet 3    lisinopril (ZESTRIL) 40 MG tablet Take 1 tablet (40 mg) by mouth daily 90 tablet 3    multivitamin w/minerals (THERA-VIT-M) tablet Take 1 tablet by mouth daily (Patient not taking: Reported on 7/15/2024)      other medical supplies Shingrix.  Diagnosis:  Z23. (Patient not taking:  "Reported on 7/15/2024) 1 each 1    SUMAtriptan (IMITREX) 50 MG tablet Take 1 tablet (50 mg) by mouth at onset of headache for migraine May repeat in 2 hours. Max 4 tablets/24 hours. (Patient not taking: Reported on 7/15/2024) 10 tablet 11     No Known Allergies  Past medical history, past surgical history, current medications and allergies reviewed and accurate to the best of my knowledge.      ROS:  Refer to HPI    /78 (BP Location: Right arm, Patient Position: Sitting, Cuff Size: Adult Regular)   Pulse 80   Temp 99.2  F (37.3  C) (Tympanic)   Resp 16   Ht 1.651 m (5' 5\")   Wt 84.4 kg (186 lb)   LMP 06/02/2018   SpO2 98%   BMI 30.95 kg/m      EXAM:  General Appearance: Well appearing 49 year old female, appropriate appearance for age. No acute distress   Eyes: conjunctivae normal without erythema or irritation, corneas clear, no drainage or crusting, no eyelid swelling, pupils equal   Oropharynx: moist mucous membranes, voice clear.    Nose:  Bilateral nares: no erythema, no edema, no drainage or congestion   Neck: supple   Respiratory: normal chest wall and respirations.  Normal effort.  No increased work of breathing.  No cough appreciated.  Musculoskeletal:  Equal movement of bilateral upper extremities.    Left lower extremity: Tenderness and swelling about the lateral malleolus, no tenderness to Achilles tendon, Valencia test negative, CMS is intact and pedal pulse 2+.  Dermatological: no rashes noted of exposed skin  Neuro: Alert and oriented to person, place, and time.  Cranial nerves II-XII grossly intact with no focal or lateralizing deficits.  Muscle tone normal.  Gait normal. No tremor.   Psychological: normal affect, alert, oriented, and pleasant.     Xray:  Results for orders placed or performed in visit on 07/15/24   XR Ankle Left G/E 3 Views     Status: None    Narrative    PROCEDURE:  XR ANKLE LEFT G/E 3 VIEWS    HISTORY: Ankle injury, left, initial encounter    COMPARISON:  " None.    TECHNIQUE:  3 views of the left ankle were obtained.    FINDINGS:  No fracture or dislocation is identified. The joint spaces  are preserved.        Impression    IMPRESSION: No acute fracture.      ULISES MENDOZA MD         SYSTEM ID:  [id number]   XR Foot Left G/E 3 Views     Status: None    Narrative    PROCEDURE:  XR FOOT LEFT G/E 3 VIEWS    HISTORY: Ankle injury, left, initial encounter    COMPARISON:  None.    TECHNIQUE:  3 views of the left foot were obtained.    FINDINGS:  No fracture or dislocation is identified. The joint spaces  are preserved.        Impression    IMPRESSION: No acute fracture.      ULISES MENDOZA MD         SYSTEM ID:  [id number]

## 2024-07-15 NOTE — NURSING NOTE
"Chief Complaint   Patient presents with    Musculoskeletal Problem     Left ankle - 1 week ago   Tx with ibuprofen, ice, and elevation  With little relief.    Initial /78 (BP Location: Right arm, Patient Position: Sitting, Cuff Size: Adult Regular)   Pulse 80   Temp 99.2  F (37.3  C) (Tympanic)   Resp 16   Ht 1.651 m (5' 5\")   Wt 84.4 kg (186 lb)   LMP 06/02/2018   SpO2 98%   BMI 30.95 kg/m   Estimated body mass index is 30.95 kg/m  as calculated from the following:    Height as of this encounter: 1.651 m (5' 5\").    Weight as of this encounter: 84.4 kg (186 lb).  Meds Reconciled: complete  Pt is not on Aspirin  Pt is on a Statin  PHQ and/or KRUNAL reviewed. Pt referred to PCP/MH Provider as appropriate.    Margret Villagomez LPN    \  "

## 2024-08-05 DIAGNOSIS — N95.1 MENOPAUSAL SYNDROME (HOT FLASHES): ICD-10-CM

## 2024-08-05 DIAGNOSIS — I10 ESSENTIAL HYPERTENSION: ICD-10-CM

## 2024-08-07 RX ORDER — AMLODIPINE BESYLATE 5 MG/1
5 TABLET ORAL DAILY
Qty: 90 TABLET | Refills: 0 | Status: SHIPPED | OUTPATIENT
Start: 2024-08-07

## 2024-08-07 RX ORDER — LISINOPRIL 40 MG/1
40 TABLET ORAL DAILY
Qty: 90 TABLET | Refills: 0 | Status: SHIPPED | OUTPATIENT
Start: 2024-08-07

## 2024-08-07 RX ORDER — ESTRADIOL 0.5 MG/1
0.5 TABLET ORAL DAILY
Qty: 90 TABLET | Refills: 0 | Status: SHIPPED | OUTPATIENT
Start: 2024-08-07

## 2024-08-07 NOTE — TELEPHONE ENCOUNTER
[facility] sent Rx request for the following:      Requested Prescriptions   Pending Prescriptions Disp Refills    estradiol (ESTRACE) 0.5 MG tablet [Pharmacy Med Name: estradiol 0.5 mg tablet] 90 tablet 3     Sig: Take 1 tablet (0.5 mg) by mouth daily       Contraceptives Protocol Failed - 8/7/2024  2:37 PM        Failed - Medication indicated for associated diagnosis     Medication is associated with one or more of the following diagnoses:  Contraception  Acne  Dysmenorrhea  Menorrhagia  Amenorrhea  PCOS  Premenstrual Dysphoric Disorder  Irregular menses  Endometriosis      Hormone Replacement Therapy Failed - 8/7/2024  2:37 PM        Failed - Medication indicated for associated diagnosis     The medication is prescribed for one or more of the following conditions:    Menopause   Vulva/Vaginal atrophy   Low Estrogen   Gender Dysphoria   Male to Female transgender      Hormone Replacement Therapy (vaginal) Passed - 8/7/2024  2:37 PM   Last Prescription Date:   7/20/23  Last Fill Qty/Refills:         90, R-3           lisinopril (ZESTRIL) 40 MG tablet [Pharmacy Med Name: lisinopril 40 mg tablet] 90 tablet 3     Sig: Take 1 tablet (40 mg) by mouth daily       ACE Inhibitors (Including Combos) Protocol Passed - 8/5/2024  1:56 PM   Last Prescription Date:   7/21/23  Last Fill Qty/Refills:         90, R-3           amLODIPine (NORVASC) 5 MG tablet [Pharmacy Med Name: amlodipine 5 mg tablet] 90 tablet 3     Sig: Take 1 tablet (5 mg) by mouth daily       Calcium Channel Blockers Protocol  Passed - 8/5/2024  1:56 PM       Last Prescription Date:   7/21/23  Last Fill Qty/Refills:         90, R-3    Last Office Visit:              8/10/23   Future Office visit:                Routing refill request to provider for review/approval     Stefania Castro RN on 8/7/2024 at 3:00 PM

## 2024-09-21 ENCOUNTER — HEALTH MAINTENANCE LETTER (OUTPATIENT)
Age: 49
End: 2024-09-21

## 2024-11-18 DIAGNOSIS — G43.809 OTHER MIGRAINE WITHOUT STATUS MIGRAINOSUS, NOT INTRACTABLE: ICD-10-CM

## 2024-11-19 RX ORDER — SUMATRIPTAN 50 MG/1
50 TABLET, FILM COATED ORAL
Qty: 10 TABLET | Refills: 2 | Status: SHIPPED | OUTPATIENT
Start: 2024-11-19

## 2024-11-19 NOTE — TELEPHONE ENCOUNTER
[facility] sent Rx request for the following:      Requested Prescriptions   Pending Prescriptions Disp Refills    SUMAtriptan (IMITREX) 50 MG tablet [Pharmacy Med Name: sumatriptan 50 mg tablet] 10 tablet 11     Sig: Take 1 tablet (50 mg) by mouth at onset of headache for migraine May repeat in 2 hours. Max 4 tablets/24 hours.       Serotonin Agonists Failed - 11/19/2024  9:08 AM        Failed - Serotonin Agonist request needs review.     Last Prescription Date:   7/20/23  Last Fill Qty/Refills:         10, R-11    Last Office Visit:              8/10/23 (Px)   Future Office visit:           None    Pt due for annual exam. Routing to provider for refill consideration. Routing to Unit scheduling pool, to assist Pt in scheduling appointment. Unable to complete prescription refill per RN Medication Refill Policy. Lizzy Rivero RN .............. 11/19/2024  9:12 AM

## 2024-11-21 DIAGNOSIS — N95.1 MENOPAUSAL SYNDROME (HOT FLASHES): ICD-10-CM

## 2024-11-21 RX ORDER — ESTRADIOL 0.5 MG/1
0.5 TABLET ORAL DAILY
Qty: 90 TABLET | Refills: 0 | Status: SHIPPED | OUTPATIENT
Start: 2024-11-21

## 2024-11-21 NOTE — TELEPHONE ENCOUNTER
Estradiol 0.5 mg      Last Written Prescription Date:  08/07/2024  Last Fill Quantity: 90,   # refills: 0  Last Office Visit: 07/15/2024  Future Office visit:    Next 5 appointments (look out 90 days)      Feb 07, 2025 3:00 PM  (Arrive by 2:45 PM)  Adult Preventative Visit with Hali Foster MD  [facility] ([facility] ) [address]  [place] [address] [phone]

## 2024-11-21 NOTE — TELEPHONE ENCOUNTER
[facility] sent Rx request for the following:      Requested Prescriptions   Pending Prescriptions Disp Refills    estradiol (ESTRACE) 0.5 MG tablet [Pharmacy Med Name: estradiol 0.5 mg tablet] 90 tablet 0     Sig: Take 1 tablet (0.5 mg) by mouth daily       Contraceptives Protocol Failed - 11/21/2024 11:07 AM        Failed - Medication indicated for associated diagnosis     Medication is associated with one or more of the following diagnoses:  Contraception  Acne  Dysmenorrhea  Menorrhagia  Amenorrhea  PCOS  Premenstrual Dysphoric Disorder  Irregular menses  Endometriosis  Contraceptive counseling  Finding of menstrual bleeding  Education about oral contraception  Uses contraception  Initial prescription of oral contraception  Oral contraception-no problem  Oral contraceptive repeat       Last Written Prescription Date:  08/07/2024  Last Fill Quantity: 90,   # refills: 0  Last Office Visit: 8/10/23    Future Office visit:             Next 5 appointments (look out 90 days)      Feb 07, 2025 3:00 PM  (Arrive by 2:45 PM)  Adult Preventative Visit with Hali Foster MD  [facility] ([facility] ) [address]  [place] [address]  [phone]           Routing refill request to provider for review/approval because:  Drug not on the [facility] refill protocol     Traci Monet RN on 11/21/2024 at 11:08 AM

## 2024-12-05 ENCOUNTER — APPOINTMENT (OUTPATIENT)
Dept: GENERAL RADIOLOGY | Facility: OTHER | Age: 49
End: 2024-12-05
Attending: STUDENT IN AN ORGANIZED HEALTH CARE EDUCATION/TRAINING PROGRAM
Payer: COMMERCIAL

## 2024-12-05 ENCOUNTER — HOSPITAL ENCOUNTER (EMERGENCY)
Facility: OTHER | Age: 49
Discharge: HOME | End: 2024-12-05
Attending: STUDENT IN AN ORGANIZED HEALTH CARE EDUCATION/TRAINING PROGRAM
Payer: COMMERCIAL

## 2024-12-05 VITALS
DIASTOLIC BLOOD PRESSURE: 80 MMHG | SYSTOLIC BLOOD PRESSURE: 129 MMHG | HEART RATE: 77 BPM | OXYGEN SATURATION: 98 % | BODY MASS INDEX: 30.95 KG/M2 | TEMPERATURE: 98.2 F | WEIGHT: 186 LBS | RESPIRATION RATE: 16 BRPM

## 2024-12-05 DIAGNOSIS — S92.515A NONDISPLACED FRACTURE OF PROXIMAL PHALANX OF LEFT LESSER TOE(S), INITIAL ENCOUNTER FOR CLOSED FRACTURE: ICD-10-CM

## 2024-12-05 PROCEDURE — 73630 X-RAY EXAM OF FOOT: CPT | Mod: LT

## 2024-12-05 PROCEDURE — 99283 EMERGENCY DEPT VISIT LOW MDM: CPT | Performed by: STUDENT IN AN ORGANIZED HEALTH CARE EDUCATION/TRAINING PROGRAM

## 2024-12-05 ASSESSMENT — ACTIVITIES OF DAILY LIVING (ADL): ADLS_ACUITY_SCORE: 52

## 2024-12-05 NOTE — ED PROVIDER NOTES
History     Chief Complaint   Patient presents with    Foot Injury     Left         Yanna Ordonez is a 49 year old female who presents with left foot pain. Pain started yesterday evening after tripping over the dog bed and hearing a crack.  Unable to bear weight on this part of the foot since.  No numbness or weakness.  She did not fall.  Denies any other injuries.  Declines any pain medications currently as there is no pain when she is not bearing any weight.     No Known Allergies    Patient Active Problem List    Diagnosis Date Noted    Hip pain, left 04/07/2021     Priority: Medium    Snoring 03/15/2021     Priority: Medium    Essential hypertension 03/15/2021     Priority: Medium    IFG (impaired fasting glucose) 03/15/2021     Priority: Medium    Obesity (BMI 30-39.9) 02/26/2021     Priority: Medium    Recurrent periodic urticaria 01/07/2021     Priority: Medium    Arthritis of finger of both hands 01/07/2021     Priority: Medium    Gastroesophageal reflux disease with esophagitis without hemorrhage 01/07/2021     Priority: Medium    Dyshidrotic eczema 01/07/2021     Priority: Medium    History of 2019 novel coronavirus disease (COVID-19) 01/07/2021     Priority: Medium    S/P hysterectomy 08/08/2018     Priority: Medium    Lightheadedness 03/21/2018     Priority: Medium    Hiatal hernia 02/11/2018     Priority: Medium    Neck pain, chronic 02/11/2018     Priority: Medium    Carpal tunnel syndrome, bilateral 10/10/2016     Priority: Medium       Past Medical History:   Diagnosis Date    Personal history of other medical treatment (CODE)        Past Surgical History:   Procedure Laterality Date    COLONOSCOPY N/A 10/12/2022    hyperplastic - f/u 10 years    DILATION AND CURETTAGE      1992,after SAB    HYSTERECTOMY VAGINAL N/A 08/08/2018    Procedure: HYSTERECTOMY VAGINAL;  Total Vaginal Hysterectomy and Bilateral Salpingectomy, Left oophorectomy;  Surgeon: Demarco Verdugo MD;  Location:  OR     LAPAROSCOPIC CHOLECYSTECTOMY N/A 2024    Procedure: CHOLECYSTECTOMY, LAPAROSCOPIC;  Surgeon: Kosta Burnett MD;  Location:  OR    LAPAROSCOPIC NISSEN FUNDOPLICATION N/A 2021    Procedure: FUNDOPLICATION, NISSEN, LAPAROSCOPIC;  Surgeon: Damien Ferguson MD;  Location: [facility] OR       Family History   Problem Relation Age of Onset    Other - See Comments Mother 65        Celiac disease    Other - See Comments Father         Glaucoma    Family History Negative Sister         Good Health    Family History Negative Sister         Good Health    Family History Negative Brother         Good Health    Family History Negative Brother         Good Health,Twin    Family History Negative Brother         Good Health,Twin - Had GIB    Heart Disease No family hx of         Heart Disease,No known premature CAD    Diabetes No family hx of         Diabetes,DM2    Breast Cancer No family hx of         Cancer-breast    Colon Cancer No family hx of         Cancer-colon       Social History     Tobacco Use    Smoking status: Former     Current packs/day: 0.00     Types: Cigarettes     Quit date: 2022     Years since quittin.6     Passive exposure: Never    Smokeless tobacco: Never   Vaping Use    Vaping status: Never Used   Substance Use Topics    Alcohol use: No     Alcohol/week: 1.7 standard drinks of alcohol    Drug use: No       Medications:    amLODIPine (NORVASC) 5 MG tablet  estradiol (ESTRACE) 0.5 MG tablet  lisinopril (ZESTRIL) 40 MG tablet  multivitamin w/minerals (THERA-VIT-M) tablet  other medical supplies  SUMAtriptan (IMITREX) 50 MG tablet      Review of Systems: See HPI for pertinent negatives and positives. All other pertinent systems reviewed and found to be negative.    Physical Exam   /80   Pulse 77   Temp 98.2  F (36.8  C) (Tympanic)   Resp 16   Wt 84.4 kg (186 lb)   LMP 2018   SpO2 98%   BMI 30.95 kg/m       General: awake, uncomfortable  HEENT: atraumatic  Respiratory:  normal effort  Cardiovascular: left toe CR<2s  Extremities: left  proximal 5th toe and distal metatarsal tender with mild swelling, ROM and strength limited by pain. Wiggles all toes. No navicular tenderness, medial or lateral malleolar tenderness.   Skin: warm, dry, mild ecchymosis over base of left 5th toe, skin intact  Neuro: alert, sensation intact throughout aforementioned foot and ankle  Psych: appropriate mood and affect    ED Course           Results for orders placed or performed during the hospital encounter of 12/05/24 (from the past 24 hours)   XR Foot Port Left 3 Views    Narrative    Exam: XR FOOT PORT LEFT 3 VIEWS     History:Female, age 49 years, foot injury    Comparison:  Left foot x-rays 7/15/2024    Technique: Three views are submitted.    Findings: Bones are normally mineralized. There is a nondisplaced  oblique fracture in the proximal phalanx of the fifth digit. No  evidence of dislocation.           Impression    Impression:  Nondisplaced fracture involving the proximal phalanx of the fifth  digit.    CHECO MORILLO MD         SYSTEM ID:  [id number]       Medications - No data to display    Assessments & Plan (with Medical Decision Making)     I have reviewed nursing notes.    49 year old female evaluated for left 5th toe pain due to trauma with no other injury or fall. CMS intact. X-ray with 5th left toe proximal phalanx fracture. Declines pain medication or crutches. Sending home with stiff sole postop shoe. Discharged home with attached instructions on diagnosis provided including ED return precautions.     I have reviewed the findings, diagnosis, plan and need for any follow up with the patient.    Patient instructions:   X-ray shows fracture (bone break) of your left 5th toe break.     Wear stiff soled shoes at all times when ambulating.    An orthopedic referral was placed. Please call at number provided to schedule appointment. This should heal on its own most likely, but we should  make sure its healing appropriately.    Recommend ice, elevation, and alternating tylenol and ibuprofen taken with food (to avoid a stomach ulcer) for pain management as needed. For moderate to severe pain, alternate nsaids (e.g. Ibuprofen) every 6 hours and Tylenol every 6 hours. For example, first take Ibuprofen, then 3 hours later take Tylenol, then 3 hours later take Ibuprofen, then 3 hours later take Tylenol, and so forth. You can take up to 3200 mg of ibuprofen and 4000 mg of tylenol over a 24 hour period. Always take nsaids with food to avoid stomach ulcer.    Please review attached instructions including reasons to return to the ER.    New Prescriptions    No medications on file       Final diagnoses:   Nondisplaced fracture of proximal phalanx of left lesser toe(s), initial encounter for closed fracture       12/5/2024   [facility] AND [facility]       Vic Guillaume MD  12/05/24 3282

## 2024-12-05 NOTE — ED TRIAGE NOTES
Pt is here today for a left foot injury.   Yesterday at approximately 1100, pt was making her bed and snagged her left leg/foot on the dog bed feet.   Pt did not fall but her foot was twisted.   CMS is intact.   There is come swelling.   Pt is particle wt bearing on that foot.     /80   Pulse 77   Temp 98.2  F (36.8  C) (Tympanic)   Resp 16   Wt 84.4 kg (186 lb)   LMP 06/02/2018   SpO2 98%   BMI 30.95 kg/m    Radha Joshua RN on 12/5/2024 at 8:05 AM       Triage Assessment (Adult)       Row Name 12/05/24 0803          Triage Assessment    Airway WDL WDL        Respiratory WDL    Respiratory WDL WDL        Skin Circulation/Temperature WDL    Skin Circulation/Temperature WDL WDL        Cardiac WDL    Cardiac WDL WDL        Peripheral/Neurovascular WDL    Peripheral Neurovascular WDL WDL        Cognitive/Neuro/Behavioral WDL    Cognitive/Neuro/Behavioral WDL WDL

## 2024-12-05 NOTE — DISCHARGE INSTRUCTIONS
X-ray shows fracture (bone break) of your left 5th toe break.     Wear stiff soled shoes at all times when ambulating.    An orthopedic referral was placed. Please call at number provided to schedule appointment. This should heal on its own most likely, but we should make sure its healing appropriately.    Recommend ice, elevation, and alternating tylenol and ibuprofen taken with food (to avoid a stomach ulcer) for pain management as needed. For moderate to severe pain, alternate nsaids (e.g. Ibuprofen) every 6 hours and Tylenol every 6 hours. For example, first take Ibuprofen, then 3 hours later take Tylenol, then 3 hours later take Ibuprofen, then 3 hours later take Tylenol, and so forth. You can take up to 3200 mg of ibuprofen and 4000 mg of tylenol over a 24 hour period. Always take nsaids with food to avoid stomach ulcer.    Please review attached instructions including reasons to return to the ER.

## 2024-12-12 ENCOUNTER — OFFICE VISIT (OUTPATIENT)
Dept: FAMILY MEDICINE | Facility: OTHER | Age: 49
End: 2024-12-12
Attending: STUDENT IN AN ORGANIZED HEALTH CARE EDUCATION/TRAINING PROGRAM
Payer: COMMERCIAL

## 2024-12-12 VITALS
TEMPERATURE: 97.5 F | SYSTOLIC BLOOD PRESSURE: 122 MMHG | WEIGHT: 190 LBS | OXYGEN SATURATION: 98 % | DIASTOLIC BLOOD PRESSURE: 80 MMHG | RESPIRATION RATE: 16 BRPM | BODY MASS INDEX: 31.62 KG/M2 | HEART RATE: 95 BPM

## 2024-12-12 DIAGNOSIS — S92.515A CLOSED NONDISPLACED FRACTURE OF PROXIMAL PHALANX OF LESSER TOE OF LEFT FOOT, INITIAL ENCOUNTER: Primary | ICD-10-CM

## 2024-12-12 ASSESSMENT — PAIN SCALES - GENERAL: PAINLEVEL_OUTOF10: NO PAIN (0)

## 2024-12-12 NOTE — PROGRESS NOTES
Sports Medicine Office Note    HPI:  49-year-old female coming in for evaluation of a right fifth toe injury that occurred on .  She has a raised dog bed in her house and she caught one of the legs with her toe as she was walking by.  She tripped and had immediate pain.  She was seen in the ER and found to have a fracture to the proximal phalanx of this digit.  She was placed in a postop shoe.  Her pain has significantly improved.  She rates the pain at 1/10.  She characterized the pain as dull.  Going up stairs is bothersome.      EXAM:  /80 (BP Location: Right arm, Patient Position: Sitting, Cuff Size: Adult Regular)   Pulse 95   Temp 97.5  F (36.4  C) (Temporal)   Resp 16   Wt 86.2 kg (190 lb)   LMP 2018   SpO2 98%   BMI 31.62 kg/m    MUSCULOSKELETAL EXAM:  LEFT FOOT AND ANKLE  Inspection:  -No gross deformity  -No bruising  -Mild swelling about the digit  -Scars:  None    Tenderness to palpation of the:  -Medial malleolus:  Negative  -Deltoid ligament:  Negative  -Lateral malleolus:  Negative  -ATFL:  Negative  -CFL:  Negative  -PTFL:  Negative  -Syndesmosis (AITFL):  Negative  -Midsubstance Achilles tendon:  Negative  -Achilles tendon insertion:  Negative  -Plantar fascial origin on the calcaneus:  Negative  -Base of the fifth metatarsal:  Negative  -Navicular:  Negative  -Lisfranc joint:  Negative  -Metatarsals:  Negative  -Fifth digit: Positive    Motor:  - Flexion/extension intact across all digits    Other:  -Intact sensation to light touch distally.  -No signs of cyanosis. Normal skin temperature.  -Knee:  No gross deformity. Normal motion.  -Right foot/ankle:  No gross deformity. No palpable tenderness. Normal strength.      IMAGIN2024: 3 view left foot x-ray  - Transverse fracture through the proximal phalanx of the fifth digit without displacement or angulation.    2024: 3 view left fifth toe x-ray  - Transverse fracture through the proximal phalanx is again noted.   No change in bony alignment.      ASSESSMENT/PLAN:  Diagnoses and all orders for this visit:  Closed nondisplaced fracture of proximal phalanx of lesser toe of left foot, initial encounter  -     XR Toe Left G/E 2 Views  -     Orthopedic  Referral  -     CLOSED TX TOE FX W/O MANIP (NOT GREAT TOE)    49-year-old female with a closed, nondisplaced, nonangulated transverse proximal phalanx fracture of the left fifth toe.  X-rays from 12/5 and 12/12 were both personally reviewed in the office with findings as demonstrated above by my interpretation.  She is now 1 week into her injury.  She meets criteria for nonoperative management.  - Continue in hard soled footwear  - As symptoms allow, okay to transition to normal footwear with a firm sole  - Ice and OTC analgesics as needed  - Follow-up as needed      Mikal Tenorio MD  12/12/2024  2:57 PM    Total time spent with this patient was 14 minutes which included chart review, visualization and independent interpretation of images, time spent with the patient, and documentation.    Procedure time:  0 minute(s)

## 2024-12-23 DIAGNOSIS — I10 ESSENTIAL HYPERTENSION: ICD-10-CM

## 2024-12-26 RX ORDER — AMLODIPINE BESYLATE 5 MG/1
5 TABLET ORAL DAILY
Qty: 90 TABLET | Refills: 0 | Status: SHIPPED | OUTPATIENT
Start: 2024-12-26

## 2024-12-26 RX ORDER — LISINOPRIL 40 MG/1
40 TABLET ORAL DAILY
Qty: 90 TABLET | Refills: 0 | Status: SHIPPED | OUTPATIENT
Start: 2024-12-26

## 2024-12-26 NOTE — TELEPHONE ENCOUNTER
[facility] sent Rx request for the following:      Requested Prescriptions   Pending Prescriptions Disp Refills    amLODIPine (NORVASC) 5 MG tablet [Pharmacy Med Name: amlodipine 5 mg tablet] 90 tablet 0     Sig: Take 1 tablet (5 mg) by mouth daily     Last Prescription Date:   08/07/2024  Last Fill Qty/Refills:         90, R-0         lisinopril (ZESTRIL) 40 MG tablet [Pharmacy Med Name: lisinopril 40 mg tablet] 90 tablet 0     Sig: Take 1 tablet (40 mg) by mouth daily      Last Prescription Date:   08/07/2024  Last Fill Qty/Refills:         90, R-0    Last Office Visit:              08/20/2023   Future Office visit:           02/07/2025    Prescription approved per [facility] Refill Protocol.   Flora Garcias RN on 12/26/2024 at 4:24 PM

## 2025-02-02 SDOH — HEALTH STABILITY: PHYSICAL HEALTH: ON AVERAGE, HOW MANY MINUTES DO YOU ENGAGE IN EXERCISE AT THIS LEVEL?: 20 MIN

## 2025-02-02 SDOH — HEALTH STABILITY: PHYSICAL HEALTH: ON AVERAGE, HOW MANY DAYS PER WEEK DO YOU ENGAGE IN MODERATE TO STRENUOUS EXERCISE (LIKE A BRISK WALK)?: 2 DAYS

## 2025-02-02 ASSESSMENT — SOCIAL DETERMINANTS OF HEALTH (SDOH): HOW OFTEN DO YOU GET TOGETHER WITH FRIENDS OR RELATIVES?: PATIENT DECLINED

## 2025-02-07 ENCOUNTER — OFFICE VISIT (OUTPATIENT)
Dept: FAMILY MEDICINE | Facility: OTHER | Age: 50
End: 2025-02-07
Attending: FAMILY MEDICINE
Payer: COMMERCIAL

## 2025-02-07 VITALS
WEIGHT: 190.6 LBS | HEIGHT: 65 IN | RESPIRATION RATE: 16 BRPM | TEMPERATURE: 97.4 F | BODY MASS INDEX: 31.75 KG/M2 | DIASTOLIC BLOOD PRESSURE: 82 MMHG | OXYGEN SATURATION: 98 % | HEART RATE: 68 BPM | SYSTOLIC BLOOD PRESSURE: 136 MMHG

## 2025-02-07 DIAGNOSIS — Z12.31 VISIT FOR SCREENING MAMMOGRAM: ICD-10-CM

## 2025-02-07 DIAGNOSIS — Z00.00 ROUTINE GENERAL MEDICAL EXAMINATION AT A HEALTH CARE FACILITY: Primary | ICD-10-CM

## 2025-02-07 DIAGNOSIS — Z13.29 SCREENING FOR THYROID DISORDER: ICD-10-CM

## 2025-02-07 DIAGNOSIS — Z13.220 SCREENING FOR LIPID DISORDERS: ICD-10-CM

## 2025-02-07 DIAGNOSIS — Z13.0 SCREENING FOR DEFICIENCY ANEMIA: ICD-10-CM

## 2025-02-07 DIAGNOSIS — I10 ESSENTIAL HYPERTENSION: ICD-10-CM

## 2025-02-07 DIAGNOSIS — G43.809 OTHER MIGRAINE WITHOUT STATUS MIGRAINOSUS, NOT INTRACTABLE: ICD-10-CM

## 2025-02-07 DIAGNOSIS — Z13.1 SCREENING FOR DIABETES MELLITUS: ICD-10-CM

## 2025-02-07 DIAGNOSIS — N95.1 MENOPAUSAL SYNDROME (HOT FLASHES): ICD-10-CM

## 2025-02-07 LAB
ALBUMIN SERPL BCG-MCNC: 5 G/DL (ref 3.5–5.2)
ALP SERPL-CCNC: 45 U/L (ref 40–150)
ALT SERPL W P-5'-P-CCNC: 19 U/L (ref 0–50)
ANION GAP SERPL CALCULATED.3IONS-SCNC: 13 MMOL/L (ref 7–15)
AST SERPL W P-5'-P-CCNC: 18 U/L (ref 0–45)
BASOPHILS # BLD AUTO: 0.1 10E3/UL (ref 0–0.2)
BASOPHILS NFR BLD AUTO: 1 %
BILIRUB SERPL-MCNC: 0.5 MG/DL
BUN SERPL-MCNC: 10.3 MG/DL (ref 6–20)
CALCIUM SERPL-MCNC: 9.6 MG/DL (ref 8.8–10.4)
CHLORIDE SERPL-SCNC: 101 MMOL/L (ref 98–107)
CHOLEST SERPL-MCNC: 243 MG/DL
CREAT SERPL-MCNC: 0.71 MG/DL (ref 0.51–0.95)
EGFRCR SERPLBLD CKD-EPI 2021: >90 ML/MIN/1.73M2
EOSINOPHIL # BLD AUTO: 0.1 10E3/UL (ref 0–0.7)
EOSINOPHIL NFR BLD AUTO: 1 %
ERYTHROCYTE [DISTWIDTH] IN BLOOD BY AUTOMATED COUNT: 12.4 % (ref 10–15)
FASTING STATUS PATIENT QL REPORTED: YES
FASTING STATUS PATIENT QL REPORTED: YES
GLUCOSE SERPL-MCNC: 94 MG/DL (ref 70–99)
HCO3 SERPL-SCNC: 25 MMOL/L (ref 22–29)
HCT VFR BLD AUTO: 40 % (ref 35–47)
HDLC SERPL-MCNC: 79 MG/DL
HGB BLD-MCNC: 13.1 G/DL (ref 11.7–15.7)
IMM GRANULOCYTES # BLD: 0 10E3/UL
IMM GRANULOCYTES NFR BLD: 0 %
LDLC SERPL CALC-MCNC: 143 MG/DL
LYMPHOCYTES # BLD AUTO: 2.3 10E3/UL (ref 0.8–5.3)
LYMPHOCYTES NFR BLD AUTO: 34 %
MCH RBC QN AUTO: 28.4 PG (ref 26.5–33)
MCHC RBC AUTO-ENTMCNC: 32.8 G/DL (ref 31.5–36.5)
MCV RBC AUTO: 87 FL (ref 78–100)
MONOCYTES # BLD AUTO: 0.3 10E3/UL (ref 0–1.3)
MONOCYTES NFR BLD AUTO: 4 %
NEUTROPHILS # BLD AUTO: 4 10E3/UL (ref 1.6–8.3)
NEUTROPHILS NFR BLD AUTO: 59 %
NONHDLC SERPL-MCNC: 164 MG/DL
NRBC # BLD AUTO: 0 10E3/UL
NRBC BLD AUTO-RTO: 0 /100
PLATELET # BLD AUTO: 280 10E3/UL (ref 150–450)
POTASSIUM SERPL-SCNC: 3.8 MMOL/L (ref 3.4–5.3)
PROT SERPL-MCNC: 7.9 G/DL (ref 6.4–8.3)
RBC # BLD AUTO: 4.61 10E6/UL (ref 3.8–5.2)
SODIUM SERPL-SCNC: 139 MMOL/L (ref 135–145)
TRIGL SERPL-MCNC: 104 MG/DL
TSH SERPL DL<=0.005 MIU/L-ACNC: 2.62 UIU/ML (ref 0.3–4.2)
WBC # BLD AUTO: 6.8 10E3/UL (ref 4–11)

## 2025-02-07 PROCEDURE — 80053 COMPREHEN METABOLIC PANEL: CPT | Mod: ZL | Performed by: FAMILY MEDICINE

## 2025-02-07 PROCEDURE — 84443 ASSAY THYROID STIM HORMONE: CPT | Mod: ZL | Performed by: FAMILY MEDICINE

## 2025-02-07 PROCEDURE — 85049 AUTOMATED PLATELET COUNT: CPT | Mod: ZL | Performed by: FAMILY MEDICINE

## 2025-02-07 PROCEDURE — 36415 COLL VENOUS BLD VENIPUNCTURE: CPT | Mod: ZL | Performed by: FAMILY MEDICINE

## 2025-02-07 PROCEDURE — 85004 AUTOMATED DIFF WBC COUNT: CPT | Mod: ZL | Performed by: FAMILY MEDICINE

## 2025-02-07 PROCEDURE — 99396 PREV VISIT EST AGE 40-64: CPT | Mod: 24 | Performed by: FAMILY MEDICINE

## 2025-02-07 PROCEDURE — 80061 LIPID PANEL: CPT | Mod: ZL | Performed by: FAMILY MEDICINE

## 2025-02-07 RX ORDER — SUMATRIPTAN 50 MG/1
50 TABLET, FILM COATED ORAL
Qty: 10 TABLET | Refills: 11 | Status: SHIPPED | OUTPATIENT
Start: 2025-02-07

## 2025-02-07 RX ORDER — ESTRADIOL 0.5 MG/1
0.5 TABLET ORAL DAILY
Qty: 90 TABLET | Refills: 4 | Status: SHIPPED | OUTPATIENT
Start: 2025-02-07

## 2025-02-07 RX ORDER — AMLODIPINE BESYLATE 5 MG/1
5 TABLET ORAL DAILY
Qty: 90 TABLET | Refills: 4 | Status: SHIPPED | OUTPATIENT
Start: 2025-02-07

## 2025-02-07 RX ORDER — LISINOPRIL 40 MG/1
40 TABLET ORAL DAILY
Qty: 90 TABLET | Refills: 4 | Status: SHIPPED | OUTPATIENT
Start: 2025-02-07

## 2025-02-07 ASSESSMENT — PAIN SCALES - GENERAL: PAINLEVEL_OUTOF10: NO PAIN (0)

## 2025-02-07 NOTE — PATIENT INSTRUCTIONS
Patient Education   Preventive Care Advice   This is general advice given by our system to help you stay healthy. However, your care team may have specific advice just for you. Please talk to your care team about your preventive care needs.  Nutrition  Eat 5 or more servings of fruits and vegetables each day.  Try wheat bread, brown rice and whole grain pasta (instead of white bread, rice, and pasta).  Get enough calcium and vitamin D. Check the label on foods and aim for 100% of the RDA (recommended daily allowance).  Lifestyle  Exercise at least 150 minutes each week  (30 minutes a day, 5 days a week).  Do muscle strengthening activities 2 days a week. These help control your weight and prevent disease.  No smoking.  Wear sunscreen to prevent skin cancer.  Have a dental exam and cleaning every 6 months.  Yearly exams  See your health care team every year to talk about:  Any changes in your health.  Any medicines your care team has prescribed.  Preventive care, family planning, and ways to prevent chronic diseases.  Shots (vaccines)   HPV shots (up to age 26), if you've never had them before.  Hepatitis B shots (up to age 59), if you've never had them before.  COVID-19 shot: Get this shot when it's due.  Flu shot: Get a flu shot every year.  Tetanus shot: Get a tetanus shot every 10 years.  Pneumococcal, hepatitis A, and RSV shots: Ask your care team if you need these based on your risk.  Shingles shot (for age 50 and up)  General health tests  Diabetes screening:  Starting at age 35, Get screened for diabetes at least every 3 years.  If you are younger than age 35, ask your care team if you should be screened for diabetes.  Cholesterol test: At age 39, start having a cholesterol test every 5 years, or more often if advised.  Bone density scan (DEXA): At age 50, ask your care team if you should have this scan for osteoporosis (brittle bones).  Hepatitis C: Get tested at least once in your life.  STIs (sexually  transmitted infections)  Before age 24: Ask your care team if you should be screened for STIs.  After age 24: Get screened for STIs if you're at risk. You are at risk for STIs (including HIV) if:  You are sexually active with more than one person.  You don't use condoms every time.  You or a partner was diagnosed with a sexually transmitted infection.  If you are at risk for HIV, ask about PrEP medicine to prevent HIV.  Get tested for HIV at least once in your life, whether you are at risk for HIV or not.  Cancer screening tests  Cervical cancer screening: If you have a cervix, begin getting regular cervical cancer screening tests starting at age 21.  Breast cancer scan (mammogram): If you've ever had breasts, begin having regular mammograms starting at age 40. This is a scan to check for breast cancer.  Colon cancer screening: It is important to start screening for colon cancer at age 45.  Have a colonoscopy test every 10 years (or more often if you're at risk) Or, ask your provider about stool tests like a FIT test every year or Cologuard test every 3 years.  To learn more about your testing options, visit:   .  For help making a decision, visit:   https://bit.ly/bb65974.  Prostate cancer screening test: If you have a prostate, ask your care team if a prostate cancer screening test (PSA) at age 55 is right for you.  Lung cancer screening: If you are a current or former smoker ages 50 to 80, ask your care team if ongoing lung cancer screenings are right for you.  For informational purposes only. Not to replace the advice of your health care provider. Copyright   2023 [facility]. All rights reserved. Clinically reviewed by the [facility] Transitions Program. [employer] [id number] - REV 01/24.  Preventing Falls: Care Instructions  Injuries and health problems such as trouble walking or poor eyesight can increase your risk of falling. So can some medicines. But there are things you can do to help  "prevent falls. You can exercise to get stronger. You can also arrange your home to make it safer.    Talk to your doctor about the medicines you take. Ask if any of them increase the risk of falls and whether they can be changed or stopped.   Try to exercise regularly. It can help improve your strength and balance. This can help lower your risk of falling.         Practice fall safety and prevention.   Wear low-heeled shoes that fit well and give your feet good support. Talk to your doctor if you have foot problems that make this hard.  Carry a cellphone or wear a medical alert device that you can use to call for help.  Use stepladders instead of chairs to reach high objects. Don't climb if you're at risk for falls. Ask for help, if needed.  Wear the correct eyeglasses, if you need them.        Make your home safer.   Remove rugs, cords, clutter, and furniture from walkways.  Keep your house well lit. Use night-lights in hallways and bathrooms.  Install and use sturdy handrails on stairways.  Wear nonskid footwear, even inside. Don't walk barefoot or in socks without shoes.        Be safe outside.   Use handrails, curb cuts, and ramps whenever possible.  Keep your hands free by using a shoulder bag or backpack.  Try to walk in well-lit areas. Watch out for uneven ground, changes in pavement, and debris.  Be careful in the winter. Walk on the grass or gravel when sidewalks are slippery. Use de-icer on steps and walkways. Add non-slip devices to shoes.    Put grab bars and nonskid mats in your shower or tub and near the toilet. Try to use a shower chair or bath bench when bathing.   Get into a tub or shower by putting in your weaker leg first. Get out with your strong side first. Have a phone or medical alert device in the bathroom with you.   Where can you learn more?  Go to https://www.[employer].net/patiented  Enter G117 in the search box to learn more about \"Preventing Falls: Care Instructions.\"  Current as of: " July 31, 2024  Content Version: 14.3    2024 [employer].   Care instructions adapted under license by your healthcare professional. If you have questions about a medical condition or this instruction, always ask your healthcare professional. [employer] disclaims any warranty or liability for your use of this information.    Learning About Stress  What is stress?     Stress is your body's response to a hard situation. Your body can have a physical, emotional, or mental response. Stress is a fact of life for most people, and it affects everyone differently. What causes stress for you may not be stressful for someone else.  A lot of things can cause stress. You may feel stress when you go on a job interview, take a test, or run a race. This kind of short-term stress is normal and even useful. It can help you if you need to work hard or react quickly. For example, stress can help you finish an important job on time.  Long-term stress is caused by ongoing stressful situations or events. Examples of long-term stress include long-term health problems, ongoing problems at work, or conflicts in your family. Long-term stress can harm your health.  How does stress affect your health?  When you are stressed, your body responds as though you are in danger. It makes hormones that speed up your heart, make you breathe faster, and give you a burst of energy. This is called the fight-or-flight stress response. If the stress is over quickly, your body goes back to normal and no harm is done.  But if stress happens too often or lasts too long, it can have bad effects. Long-term stress can make you more likely to get sick, and it can make symptoms of some diseases worse. If you tense up when you are stressed, you may develop neck, shoulder, or low back pain. Stress is linked to high blood pressure and heart disease.  Stress also harms your emotional health. It can make you badillo, tense, or depressed. Your  relationships may suffer, and you may not do well at work or school.  What can you do to manage stress?  You can try these things to help manage stress:   Do something active. Exercise or activity can help reduce stress. Walking is a great way to get started. Even everyday activities such as housecleaning or yard work can help.  Try yoga or robert chi. These techniques combine exercise and meditation. You may need some training at first to learn them.  Do something you enjoy. For example, listen to music or go to a movie. Practice your hobby or do volunteer work.  Meditate. This can help you relax, because you are not worrying about what happened before or what may happen in the future.  Do guided imagery. Imagine yourself in any setting that helps you feel calm. You can use online videos, books, or a teacher to guide you.  Do breathing exercises. For example:  From a standing position, bend forward from the waist with your knees slightly bent. Let your arms dangle close to the floor.  Breathe in slowly and deeply as you return to a standing position. Roll up slowly and lift your head last.  Hold your breath for just a few seconds in the standing position.  Breathe out slowly and bend forward from the waist.  Let your feelings out. Talk, laugh, cry, and express anger when you need to. Talking with supportive friends or family, a counselor, or a sara leader about your feelings is a healthy way to relieve stress. Avoid discussing your feelings with people who make you feel worse.  Write. It may help to write about things that are bothering you. This helps you find out how much stress you feel and what is causing it. When you know this, you can find better ways to cope.  What can you do to prevent stress?  You might try some of these things to help prevent stress:  Manage your time. This helps you find time to do the things you want and need to do.  Get enough sleep. Your body recovers from the stresses of the day while  "you are sleeping.  Get support. Your family, friends, and community can make a difference in how you experience stress.  Limit your news feed. Avoid or limit time on social media or news that may make you feel stressed.  Do something active. Exercise or activity can help reduce stress. Walking is a great way to get started.  Where can you learn more?  Go to https://www.[facility].net/patiented  Enter N032 in the search box to learn more about \"Learning About Stress.\"  Current as of: October 24, 2023  Content Version: 14.3    2024 [employer]   Care instructions adapted under license by your healthcare professional. If you have questions about a medical condition or this instruction, always ask your healthcare professional. [employer] disclaims any warranty or liability for your use of this information.       "

## 2025-02-07 NOTE — PROGRESS NOTES
Preventive Care Visit  [facility] AND [facility]  Hali Foster MD, Family Medicine  Feb 7, 2025          Sameer Raines is a 49 year old, presenting for the following:  Physical        2/7/2025     3:11 PM   Additional Questions   Roomed by Radha Cabrera        Yanna is here for a physical.  She has no particular concerns today.      Health Care Directive  Patient does not have a Health Care Directive: Discussed advance care planning with patient; however, patient declined at this time.      2/2/2025   General Health   How would you rate your overall physical health? (!) FAIR   Feel stress (tense, anxious, or unable to sleep) To some extent   (!) STRESS CONCERN      2/2/2025   Nutrition   Three or more servings of calcium each day? Yes   Diet: Regular (no restrictions)   How many servings of fruit and vegetables per day? (!) 2-3   How many sweetened beverages each day? 0-1         2/2/2025   Exercise   Days per week of moderate/strenous exercise 2 days   Average minutes spent exercising at this level 20 min   (!) EXERCISE CONCERN      2/2/2025   Social Factors   Frequency of gathering with friends or relatives Patient declined   Worry food won't last until get money to buy more No   Food not last or not have enough money for food? No   Do you have housing? (Housing is defined as stable permanent housing and does not include staying ouside in a car, in a tent, in an abandoned building, in an overnight shelter, or couch-surfing.) Yes   Are you worried about losing your housing? No   Lack of transportation? No   Unable to get utilities (heat,electricity)? No         2/7/2025   Fall Risk   Reason Gait Speed Test Not Completed Patient declines          2/2/2025   Dental   Dentist two times every year? Yes         2/2/2025   TB Screening   Were you born outside of the [place]? No         Today's PHQ-2 Score:       2/7/2025     2:41 PM   PHQ-2 ( 1999 [employer])   Q1: Little interest or pleasure in doing  things 0   Q2: Feeling down, depressed or hopeless 0   PHQ-2 Score 0    Q1: Little interest or pleasure in doing things Not at all   Q2: Feeling down, depressed or hopeless Not at all   PHQ-2 Score 0       Patient-reported           2025   Substance Use   Alcohol more than 3/day or more than 7/wk No   Do you use any other substances recreationally? No     Social History     Tobacco Use    Smoking status: Former     Current packs/day: 0.00     Types: Cigarettes     Quit date: 2022     Years since quittin.7     Passive exposure: Never    Smokeless tobacco: Never   Vaping Use    Vaping status: Never Used   Substance Use Topics    Alcohol use: No     Alcohol/week: 1.7 standard drinks of alcohol    Drug use: No           8/10/2023   LAST FHS-7 RESULTS   1st degree relative breast or ovarian cancer No   Any relative bilateral breast cancer No   Any male have breast cancer No   Any ONE woman have BOTH breast AND ovarian cancer No        Mammogram Screening - Mammogram every 1-2 years updated in Health Maintenance based on mutual decision making        2025   STI Screening   New sexual partner(s) since last STI/HIV test? No     History of abnormal Pap smear: No - age 30- 64 PAP with HPV every 5 years recommended        Latest Ref Rng & Units 3/21/2018    10:31 AM   PAP / HPV   HPV 16 DNA NEG^Negative Negative    HPV 18 DNA NEG^Negative Negative    Other HR HPV NEG^Negative Negative      ASCVD Risk   The 10-year ASCVD risk score (Abad HOUSTON, et al., 2019) is: 1.7%    Values used to calculate the score:      Age: 49 years      Sex: Female      Is Non- : No      Diabetic: No      Tobacco smoker: No      Systolic Blood Pressure: 136 mmHg      Is BP treated: Yes      HDL Cholesterol: 79 mg/dL      Total Cholesterol: 243 mg/dL           Reviewed and updated as needed this visit by Provider                    Past Medical History:   Diagnosis Date    Personal history of other medical  treatment (CODE)     Childbirth     Past Surgical History:   Procedure Laterality Date    COLONOSCOPY N/A 10/12/2022    hyperplastic - f/u 10 years    DILATION AND CURETTAGE      1992,after SAB    HYSTERECTOMY VAGINAL N/A 08/08/2018    Procedure: HYSTERECTOMY VAGINAL;  Total Vaginal Hysterectomy and Bilateral Salpingectomy, Left oophorectomy;  Surgeon: Demarco Verdugo MD;  Location: [facility] OR    LAPAROSCOPIC CHOLECYSTECTOMY N/A 6/11/2024    Procedure: CHOLECYSTECTOMY, LAPAROSCOPIC;  Surgeon: Kosta Burnett MD;  Location: [facility] OR    LAPAROSCOPIC NISSEN FUNDOPLICATION N/A 04/21/2021    Procedure: FUNDOPLICATION, NISSEN, LAPAROSCOPIC;  Surgeon: Damien Ferguson MD;  Location: [facility] OR     BP Readings from Last 3 Encounters:   02/07/25 136/82   12/12/24 122/80   12/05/24 129/80    Wt Readings from Last 3 Encounters:   02/07/25 86.5 kg (190 lb 9.6 oz)   12/12/24 86.2 kg (190 lb)   12/05/24 84.4 kg (186 lb)                  Patient Active Problem List   Diagnosis    Carpal tunnel syndrome, bilateral    Hiatal hernia    Neck pain, chronic    Lightheadedness    S/P hysterectomy    Recurrent periodic urticaria    Arthritis of finger of both hands    Gastroesophageal reflux disease with esophagitis without hemorrhage    Dyshidrotic eczema    History of 2019 novel coronavirus disease (COVID-19)    Obesity (BMI 30-39.9)    Snoring    Essential hypertension    IFG (impaired fasting glucose)    Hip pain, left     Past Surgical History:   Procedure Laterality Date    COLONOSCOPY N/A 10/12/2022    hyperplastic - f/u 10 years    DILATION AND CURETTAGE      1992,after SAB    HYSTERECTOMY VAGINAL N/A 08/08/2018    Procedure: HYSTERECTOMY VAGINAL;  Total Vaginal Hysterectomy and Bilateral Salpingectomy, Left oophorectomy;  Surgeon: Demarco Verdugo MD;  Location: [facility] OR    LAPAROSCOPIC CHOLECYSTECTOMY N/A 6/11/2024    Procedure: CHOLECYSTECTOMY, LAPAROSCOPIC;  Surgeon: Kosta Burnett MD;  Location: [facility] OR    LAPAROSCOPIC NISSEN FUNDOPLICATION  "N/A 2021    Procedure: FUNDOPLICATION, NISSEN, LAPAROSCOPIC;  Surgeon: Damien Ferguson MD;  Location:  OR       Social History     Tobacco Use    Smoking status: Former     Current packs/day: 0.00     Types: Cigarettes     Quit date: 2022     Years since quittin.7     Passive exposure: Never    Smokeless tobacco: Never   Substance Use Topics    Alcohol use: No     Alcohol/week: 1.7 standard drinks of alcohol     Family History   Problem Relation Age of Onset    Other - See Comments Mother 65        Celiac disease    Other - See Comments Father         Glaucoma    Family History Negative Sister         Good Health    Family History Negative Sister         Good Health    Family History Negative Brother         Good Health    Family History Negative Brother         Good Health,Twin    Family History Negative Brother         Good Health,Twin - Had GIB    Heart Disease No family hx of         Heart Disease,No known premature CAD    Diabetes No family hx of         Diabetes,DM2    Breast Cancer No family hx of         Cancer-breast    Colon Cancer No family hx of         Cancer-colon         Current Outpatient Medications   Medication Sig Dispense Refill    amLODIPine (NORVASC) 5 MG tablet Take 1 tablet (5 mg) by mouth daily. 90 tablet 4    estradiol (ESTRACE) 0.5 MG tablet Take 1 tablet (0.5 mg) by mouth daily. 90 tablet 4    lisinopril (ZESTRIL) 40 MG tablet Take 1 tablet (40 mg) by mouth daily. 90 tablet 4    SUMAtriptan (IMITREX) 50 MG tablet Take 1 tablet (50 mg) by mouth at onset of headache for migraine. May repeat in 2 hours. Max 4 tablets/24 hours. 10 tablet 11     No Known Allergies      Review of Systems  Constitutional, HEENT, cardiovascular, pulmonary, GI, , musculoskeletal, neuro, skin, endocrine and psych systems are negative, except as otherwise noted.     Objective    Exam  /82   Pulse 68   Temp 97.4  F (36.3  C) (Tympanic)   Resp 16   Ht 1.651 m (5' 5\")   Wt 86.5 kg " "(190 lb 9.6 oz)   LMP 06/02/2018   SpO2 98%   Breastfeeding No   BMI 31.72 kg/m     Estimated body mass index is 31.72 kg/m  as calculated from the following:    Height as of this encounter: 1.651 m (5' 5\").    Weight as of this encounter: 86.5 kg (190 lb 9.6 oz).    Physical Exam  Constitutional:       General: She is not in acute distress.     Appearance: She is well-developed.   HENT:      Head: Normocephalic.      Right Ear: Tympanic membrane and external ear normal.      Left Ear: Tympanic membrane and external ear normal.      Nose: Nose normal.      Mouth/Throat:      Mouth: Mucous membranes are moist.      Pharynx: Oropharynx is clear. No oropharyngeal exudate or posterior oropharyngeal erythema.   Eyes:      General:         Right eye: No discharge.         Left eye: No discharge.      Conjunctiva/sclera: Conjunctivae normal.      Pupils: Pupils are equal, round, and reactive to light.   Neck:      Thyroid: No thyromegaly.      Trachea: No tracheal deviation.   Cardiovascular:      Rate and Rhythm: Normal rate and regular rhythm.      Pulses: Normal pulses.      Heart sounds: Normal heart sounds, S1 normal and S2 normal. No murmur heard.     No friction rub. No gallop. No S3 or S4 sounds.   Pulmonary:      Effort: Pulmonary effort is normal. No respiratory distress.      Breath sounds: Normal breath sounds. No wheezing or rales.      Comments: Breast exam:  No masses palpable bilaterally.  No skin changes, tethering or axillary lymphadenopathy bilaterally.    Abdominal:      General: Bowel sounds are normal. There is no distension.      Palpations: Abdomen is soft. There is no mass.      Tenderness: There is no abdominal tenderness.   Genitourinary:     Comments: Pelvic/Rectal exams deferred per patient.  Musculoskeletal:         General: Normal range of motion.      Cervical back: Neck supple.   Lymphadenopathy:      Cervical: No cervical adenopathy.   Skin:     General: Skin is warm and dry.      " Findings: No rash.   Neurological:      Mental Status: She is alert and oriented to person, place, and time.      Motor: No abnormal muscle tone.      Deep Tendon Reflexes: Reflexes are normal and symmetric.   Psychiatric:         Mood and Affect: Mood normal.         Thought Content: Thought content normal.         Judgment: Judgment normal.             ICD-10-CM    1. Routine general medical examination at a health care facility  Z00.00       2. Visit for screening mammogram  Z12.31 MA Screening Bilateral w/ Vinicius      3. Essential hypertension  I10 amLODIPine (NORVASC) 5 MG tablet     lisinopril (ZESTRIL) 40 MG tablet      4. Menopausal syndrome (hot flashes)  N95.1 estradiol (ESTRACE) 0.5 MG tablet      5. Other migraine without status migrainosus, not intractable  G43.809 SUMAtriptan (IMITREX) 50 MG tablet      6. Screening for lipid disorders  Z13.220 Lipid Profile     Lipid Profile      7. Screening for diabetes mellitus  Z13.1 Comprehensive metabolic panel     Comprehensive metabolic panel      8. Screening for thyroid disorder  Z13.29 TSH with free T4 reflex     TSH with free T4 reflex      9. Screening for deficiency anemia  Z13.0 CBC with Platelets & Differential     CBC with Platelets & Differential           Mammogram ordered.  Pap Smear deferred as she is s/p hysterectomy and last Pap Smear was normal.  Colonoscopy last completed 10/12/22 and was normal.  10 year follow up recommended.  Tdap, flu are up to date.  Declines Shingrix, covid, prevnar at this time.  Lung cancer screening CT deferred due to <20 pack year history.  See #1.  Blood pressure stable.  Refills as above.  Labs as noted.  Refill as above.  Refill as above.  Lab as above.  Lab as above.  Labs as above.  Labs as above.    Return in about 53 weeks (around 2/13/2026) for Annual Wellness Visit.     The longitudinal plan of care for the diagnosis(es)/condition(s) as documented were addressed during this visit. Due to the added complexity  in care, I will continue to support Yanna in the subsequent management and with ongoing continuity of care.      Hali Foster MD

## 2025-02-08 ENCOUNTER — MYC MEDICAL ADVICE (OUTPATIENT)
Dept: FAMILY MEDICINE | Facility: OTHER | Age: 50
End: 2025-02-08
Payer: COMMERCIAL

## 2025-02-08 DIAGNOSIS — T75.3XXA MOTION SICKNESS, INITIAL ENCOUNTER: Primary | ICD-10-CM

## 2025-02-10 RX ORDER — SCOPOLAMINE 1 MG/3D
1 PATCH, EXTENDED RELEASE TRANSDERMAL
Qty: 3 PATCH | Refills: 0 | Status: SHIPPED | OUTPATIENT
Start: 2025-02-10

## 2025-02-10 NOTE — TELEPHONE ENCOUNTER
Pt is requesting rx for Scopolamine for travel.     Rios'd up order for #3.     Routing to provider to review and respond.  April Lowe RN on 2/10/2025 at 4:22 PM

## 2025-03-14 ENCOUNTER — HOSPITAL ENCOUNTER (OUTPATIENT)
Dept: MAMMOGRAPHY | Facility: OTHER | Age: 50
Discharge: HOME | End: 2025-03-14
Attending: FAMILY MEDICINE | Admitting: FAMILY MEDICINE
Payer: COMMERCIAL

## 2025-03-14 DIAGNOSIS — Z12.31 VISIT FOR SCREENING MAMMOGRAM: ICD-10-CM

## 2025-03-14 PROCEDURE — 77063 BREAST TOMOSYNTHESIS BI: CPT

## (undated) DEVICE — PREP CHLORAPREP 26ML TINTED ORANGE  260815

## (undated) DEVICE — ENDO SCOPE WARMER DUAL LAP TM500D

## (undated) DEVICE — SUCTION MANIFOLD NEPTUNE 2 SYS 4 PORT 0702-020-000

## (undated) DEVICE — SUTURE 0 VICRYL MO-4 HGS-21 VIOLET 27IN

## (undated) DEVICE — ENDO POUCH UNIV RETRIEVAL SYSTEM INZII 10MM CD001

## (undated) DEVICE — DRSG MEDIPORE 2X2 3/4" 3562

## (undated) DEVICE — GLOVE BIOGEL PI INDICATOR 8.0 LF 41680

## (undated) DEVICE — ENDO KIT COMPLIANCE DYKENDOCMPLY

## (undated) DEVICE — ENDO TROCAR FIRST ENTRY KII FIOS Z-THRD 12X100MM CTF73

## (undated) DEVICE — KIT PATIENT POSITIONING PIGAZZI LATEX FREE 40580

## (undated) DEVICE — PAD CHUX UNDERPAD 30X36" P3036C

## (undated) DEVICE — TUBING SUCTION 10'X3/16" N510

## (undated) DEVICE — TUBING INSUFFLATOR W/FILTER OLYMPUS WA95005A

## (undated) DEVICE — SU VICRYL 0 CT-2 27" UND J270H

## (undated) DEVICE — VERRES NEEDLE 120MM DISPOSABLE 12/BX

## (undated) DEVICE — DRAPE POUCH INSTRUMENT 1018

## (undated) DEVICE — ADH SKIN CLOSURE PREMIERPRO EXOFIN 1.0ML 3470

## (undated) DEVICE — DRSG MEDIPORE 3 1/2X4" 3566

## (undated) DEVICE — SLEEVE COMPRESSION SCD KNEE MED 74022

## (undated) DEVICE — ENDO FORCEP ENDOJAW BIOPSY 2.8MMX230CM FB-220U

## (undated) DEVICE — GOWN XLG XLONG DISP LF DYNJP2302P

## (undated) DEVICE — NDL SPINAL 20GA 3.5" 405182

## (undated) DEVICE — LUBRICANT INST ELECTROLUBE EL101

## (undated) DEVICE — STRAP STIRRUP W/O SLIP 30187-020

## (undated) DEVICE — Device

## (undated) DEVICE — DRAPE MAGNETIC 16X20" DYNJMDL1

## (undated) DEVICE — CLIP APPLIER ENDO ROTATING 10MM MED/LG ER320

## (undated) DEVICE — SOL WATER 1500ML

## (undated) DEVICE — SYR 10ML FINGER CONTROL W/O NDL 309695

## (undated) DEVICE — PREP CHLORAPREP CLEAR 26ML APPLICATOR 260800

## (undated) DEVICE — ESU HOLDER LAP INST DISP PURPLE LONG 330MM H-PRO-330

## (undated) DEVICE — DRAPE SHEET REV FOLD 3/4 9349

## (undated) DEVICE — DRAIN PENROSE 1/2X12" SILICONE GR103

## (undated) DEVICE — TRAY 16FR CATH W/URINE METER SILVER 903116

## (undated) DEVICE — ENDO BITE BLOCK 60 MAXI LF 00712804

## (undated) DEVICE — ESU HARMONIC ACE LAPAROSCOPIC SHEARS 5MMX36CM CVD HAR36

## (undated) DEVICE — LUBRICATING JELLY 2.7GM T00137

## (undated) DEVICE — PACK LAPAROSCOPY LF SBA15LPFCA

## (undated) DEVICE — SUCTION STRYKERFLOW II 250-070-500

## (undated) DEVICE — LEGGINGS 31X48" LF KM89408

## (undated) DEVICE — ESU GROUND PAD ADULT W/CORD E7507

## (undated) DEVICE — SUTURE 5-0 MAXON SMM5042

## (undated) DEVICE — ESU LIGASURE IMPACT OPEN SEALER/DVDR CVD LG JAW LF4418

## (undated) DEVICE — SU VICRYL 0 UR-6 27" J603H

## (undated) DEVICE — ESU HOLSTER PLASTIC DISP E2400

## (undated) DEVICE — SU VICRYL 3-0 CT-3 27" J327H

## (undated) DEVICE — FASTENER LEGBAND CATHETER DALE 316

## (undated) DEVICE — GLOVE PROTEXIS POWDER FREE SMT 7.5  2D72PT75X

## (undated) DEVICE — SYR 50ML LL W/O NDL 309653

## (undated) DEVICE — GLOVE ESTEEM POWDER FREE SMT 8.0  2D72PT80

## (undated) DEVICE — COVER LIGHT HANDLE LT-F02

## (undated) DEVICE — PREP SKIN SCRUB TRAY 4461A

## (undated) DEVICE — SU ENDO STITICH SURGIDAC 0 ES-9 48"  173024

## (undated) DEVICE — DEVICE ENDO STITCH APPLIER 10MM 173016

## (undated) DEVICE — ENDO TROCAR SLEEVE KII Z-THREADED 05X100MM CTS02

## (undated) DEVICE — CATH SELF FEMALE 14FR 6" 214

## (undated) DEVICE — PAD PERI INDIV WRAP 11" 2022A

## (undated) DEVICE — DRAPE UNDER BUTTOCK W/FLUID POUCH

## (undated) DEVICE — ENDO TROCAR SLEEVE KII 5X100MM CTR03

## (undated) DEVICE — GLOVE PROTEXIS POWDER FREE SMT 8.0  2D72PT80X

## (undated) DEVICE — ESU CORD MONOPOLAR 10'  E0510

## (undated) DEVICE — PANTIES MESH LG/XLG 2PK 706M2

## (undated) DEVICE — PACK MAJOR LAPAROTOMY LF SBA15MLFCA

## (undated) DEVICE — DECANTER VIAL 2006S

## (undated) DEVICE — CATH TRAY FOLEY SURESTEP 16FR W/URINE MTR STATLK LF A303416A

## (undated) DEVICE — SU MONOCRYL 4-0 PS-2 27" UND Y426H

## (undated) DEVICE — ENDO BRUSH CHANNEL MASTER CLEANING 2-4.2MM BW-412T

## (undated) DEVICE — LIGHT HANDLES PLASTIC

## (undated) RX ORDER — CEFAZOLIN SODIUM 1 G/3ML
INJECTION, POWDER, FOR SOLUTION INTRAMUSCULAR; INTRAVENOUS
Status: DISPENSED
Start: 2021-04-21

## (undated) RX ORDER — BUPIVACAINE HYDROCHLORIDE 2.5 MG/ML
INJECTION, SOLUTION EPIDURAL; INFILTRATION; INTRACAUDAL
Status: DISPENSED
Start: 2024-06-11

## (undated) RX ORDER — CEFAZOLIN SODIUM/WATER 2 G/20 ML
SYRINGE (ML) INTRAVENOUS
Status: DISPENSED
Start: 2024-06-11

## (undated) RX ORDER — PROPOFOL 10 MG/ML
INJECTION, EMULSION INTRAVENOUS
Status: DISPENSED
Start: 2018-08-08

## (undated) RX ORDER — GLYCOPYRROLATE 0.2 MG/ML
INJECTION, SOLUTION INTRAMUSCULAR; INTRAVENOUS
Status: DISPENSED
Start: 2018-08-08

## (undated) RX ORDER — LIDOCAINE HYDROCHLORIDE 10 MG/ML
INJECTION, SOLUTION EPIDURAL; INFILTRATION; INTRACAUDAL; PERINEURAL
Status: DISPENSED
Start: 2021-02-09

## (undated) RX ORDER — LIDOCAINE HYDROCHLORIDE 40 MG/ML
SOLUTION TOPICAL
Status: DISPENSED
Start: 2018-08-08

## (undated) RX ORDER — ACETAMINOPHEN 325 MG/1
TABLET ORAL
Status: DISPENSED
Start: 2021-04-21

## (undated) RX ORDER — LIDOCAINE HYDROCHLORIDE 20 MG/ML
INJECTION, SOLUTION EPIDURAL; INFILTRATION; INTRACAUDAL; PERINEURAL
Status: DISPENSED
Start: 2018-08-08

## (undated) RX ORDER — NEOSTIGMINE METHYLSULFATE 0.5 MG/ML
INJECTION INTRAVENOUS
Status: DISPENSED
Start: 2018-08-08

## (undated) RX ORDER — ONDANSETRON 2 MG/ML
INJECTION INTRAMUSCULAR; INTRAVENOUS
Status: DISPENSED
Start: 2018-08-08

## (undated) RX ORDER — PROPOFOL 10 MG/ML
INJECTION, EMULSION INTRAVENOUS
Status: DISPENSED
Start: 2021-02-09

## (undated) RX ORDER — KETOROLAC TROMETHAMINE 30 MG/ML
INJECTION, SOLUTION INTRAMUSCULAR; INTRAVENOUS
Status: DISPENSED
Start: 2018-08-08

## (undated) RX ORDER — DEXAMETHASONE SODIUM PHOSPHATE 4 MG/ML
INJECTION, SOLUTION INTRA-ARTICULAR; INTRALESIONAL; INTRAMUSCULAR; INTRAVENOUS; SOFT TISSUE
Status: DISPENSED
Start: 2024-06-11

## (undated) RX ORDER — KETOROLAC TROMETHAMINE 30 MG/ML
INJECTION, SOLUTION INTRAMUSCULAR; INTRAVENOUS
Status: DISPENSED
Start: 2023-07-20

## (undated) RX ORDER — ONDANSETRON 2 MG/ML
INJECTION INTRAMUSCULAR; INTRAVENOUS
Status: DISPENSED
Start: 2021-04-21

## (undated) RX ORDER — ONDANSETRON 2 MG/ML
INJECTION INTRAMUSCULAR; INTRAVENOUS
Status: DISPENSED
Start: 2024-06-11

## (undated) RX ORDER — LIDOCAINE HYDROCHLORIDE 20 MG/ML
INJECTION, SOLUTION EPIDURAL; INFILTRATION; INTRACAUDAL; PERINEURAL
Status: DISPENSED
Start: 2022-10-12

## (undated) RX ORDER — LIDOCAINE HYDROCHLORIDE 20 MG/ML
INJECTION, SOLUTION EPIDURAL; INFILTRATION; INTRACAUDAL; PERINEURAL
Status: DISPENSED
Start: 2021-04-21

## (undated) RX ORDER — KETOROLAC TROMETHAMINE 30 MG/ML
INJECTION, SOLUTION INTRAMUSCULAR; INTRAVENOUS
Status: DISPENSED
Start: 2021-04-21

## (undated) RX ORDER — BUPIVACAINE HYDROCHLORIDE AND EPINEPHRINE 5; 5 MG/ML; UG/ML
INJECTION, SOLUTION EPIDURAL; INTRACAUDAL; PERINEURAL
Status: DISPENSED
Start: 2018-08-08

## (undated) RX ORDER — FENTANYL CITRATE 50 UG/ML
INJECTION, SOLUTION INTRAMUSCULAR; INTRAVENOUS
Status: DISPENSED
Start: 2021-04-21

## (undated) RX ORDER — DEXAMETHASONE SODIUM PHOSPHATE 4 MG/ML
INJECTION, SOLUTION INTRA-ARTICULAR; INTRALESIONAL; INTRAMUSCULAR; INTRAVENOUS; SOFT TISSUE
Status: DISPENSED
Start: 2018-08-08

## (undated) RX ORDER — GLYCOPYRROLATE 0.2 MG/ML
INJECTION, SOLUTION INTRAMUSCULAR; INTRAVENOUS
Status: DISPENSED
Start: 2021-04-21

## (undated) RX ORDER — DEXAMETHASONE SODIUM PHOSPHATE 4 MG/ML
INJECTION, SOLUTION INTRA-ARTICULAR; INTRALESIONAL; INTRAMUSCULAR; INTRAVENOUS; SOFT TISSUE
Status: DISPENSED
Start: 2021-04-21

## (undated) RX ORDER — DEXMEDETOMIDINE HYDROCHLORIDE 4 UG/ML
INJECTION, SOLUTION INTRAVENOUS
Status: DISPENSED
Start: 2021-04-21

## (undated) RX ORDER — CIPROFLOXACIN 250 MG/1
TABLET, FILM COATED ORAL
Status: DISPENSED
Start: 2024-06-03

## (undated) RX ORDER — CEFAZOLIN SODIUM 2 G/100ML
INJECTION, SOLUTION INTRAVENOUS
Status: DISPENSED
Start: 2018-08-08

## (undated) RX ORDER — KETAMINE HYDROCHLORIDE 50 MG/ML
INJECTION, SOLUTION INTRAMUSCULAR; INTRAVENOUS
Status: DISPENSED
Start: 2018-08-08

## (undated) RX ORDER — PROPOFOL 10 MG/ML
INJECTION, EMULSION INTRAVENOUS
Status: DISPENSED
Start: 2022-10-12

## (undated) RX ORDER — PROPOFOL 10 MG/ML
INJECTION, EMULSION INTRAVENOUS
Status: DISPENSED
Start: 2021-04-21

## (undated) RX ORDER — BUPIVACAINE HYDROCHLORIDE AND EPINEPHRINE 5; 5 MG/ML; UG/ML
INJECTION, SOLUTION EPIDURAL; INTRACAUDAL; PERINEURAL
Status: DISPENSED
Start: 2021-04-21

## (undated) RX ORDER — FENTANYL CITRATE 50 UG/ML
INJECTION, SOLUTION INTRAMUSCULAR; INTRAVENOUS
Status: DISPENSED
Start: 2018-08-08

## (undated) RX ORDER — HYDROMORPHONE HYDROCHLORIDE 2 MG/ML
INJECTION, SOLUTION INTRAMUSCULAR; INTRAVENOUS; SUBCUTANEOUS
Status: DISPENSED
Start: 2018-08-08

## (undated) RX ORDER — LIDOCAINE HYDROCHLORIDE 10 MG/ML
INJECTION, SOLUTION EPIDURAL; INFILTRATION; INTRACAUDAL; PERINEURAL
Status: DISPENSED
Start: 2018-08-08

## (undated) RX ORDER — ACETAMINOPHEN 325 MG/1
TABLET ORAL
Status: DISPENSED
Start: 2024-06-11

## (undated) RX ORDER — CEFAZOLIN SODIUM 2 G/100ML
INJECTION, SOLUTION INTRAVENOUS
Status: DISPENSED
Start: 2021-04-21

## (undated) RX ORDER — PROPOFOL 10 MG/ML
INJECTION, EMULSION INTRAVENOUS
Status: DISPENSED
Start: 2024-06-11

## (undated) RX ORDER — NEOSTIGMINE METHYLSULFATE 0.5 MG/ML
INJECTION INTRAVENOUS
Status: DISPENSED
Start: 2021-04-21

## (undated) RX ORDER — LIDOCAINE HYDROCHLORIDE 20 MG/ML
JELLY TOPICAL
Status: DISPENSED
Start: 2021-04-21

## (undated) RX ORDER — FENTANYL CITRATE 50 UG/ML
INJECTION, SOLUTION INTRAMUSCULAR; INTRAVENOUS
Status: DISPENSED
Start: 2024-06-11